# Patient Record
Sex: FEMALE | Race: ASIAN | Employment: FULL TIME | URBAN - NONMETROPOLITAN AREA
[De-identification: names, ages, dates, MRNs, and addresses within clinical notes are randomized per-mention and may not be internally consistent; named-entity substitution may affect disease eponyms.]

---

## 2017-01-31 ENCOUNTER — TRANSFERRED RECORDS (OUTPATIENT)
Dept: HEALTH INFORMATION MANAGEMENT | Facility: HOSPITAL | Age: 52
End: 2017-01-31

## 2017-01-31 ENCOUNTER — RESULTS ONLY (OUTPATIENT)
Dept: LAB | Age: 52
End: 2017-01-31

## 2017-01-31 ENCOUNTER — APPOINTMENT (OUTPATIENT)
Dept: LAB | Facility: HOSPITAL | Age: 52
End: 2017-01-31
Attending: SURGERY
Payer: COMMERCIAL

## 2017-01-31 LAB — COLONOSCOPY: NORMAL

## 2017-01-31 PROCEDURE — 88305 TISSUE EXAM BY PATHOLOGIST: CPT | Mod: TC | Performed by: SURGERY

## 2017-02-02 LAB — COPATH REPORT: NORMAL

## 2017-03-14 ENCOUNTER — TRANSFERRED RECORDS (OUTPATIENT)
Dept: HEALTH INFORMATION MANAGEMENT | Facility: HOSPITAL | Age: 52
End: 2017-03-14

## 2017-03-14 LAB
ALT SERPL-CCNC: 38 U/L (ref 19–47)
AST SERPL-CCNC: 60 U/L (ref 9–34)
CHOLEST SERPL-MCNC: 192 MG/DL (ref 0–200)
CREAT SERPL-MCNC: 0.6 MG/DL (ref 0.7–1.4)
GLUCOSE SERPL-MCNC: 103 MG/DL (ref 64–112)
HDLC SERPL-MCNC: 54 MG/DL (ref 35–80)
LDLC SERPL CALC-MCNC: 68 MG/DL (ref 0–130)
POTASSIUM SERPL-SCNC: 4 MEQ/L (ref 3.5–5.3)
TRIGL SERPL-MCNC: 350 MG/DL (ref 40–197)

## 2017-03-16 DIAGNOSIS — R10.32 ABDOMINAL PAIN, LEFT LOWER QUADRANT: Primary | ICD-10-CM

## 2017-03-18 DIAGNOSIS — R10.32 ABDOMINAL PAIN, LEFT LOWER QUADRANT: Primary | ICD-10-CM

## 2017-03-20 DIAGNOSIS — R10.32 ABDOMINAL PAIN, LEFT LOWER QUADRANT: Primary | ICD-10-CM

## 2017-03-22 ENCOUNTER — HOSPITAL ENCOUNTER (OUTPATIENT)
Dept: CT IMAGING | Facility: HOSPITAL | Age: 52
Discharge: HOME | End: 2017-03-22
Attending: NURSE PRACTITIONER | Admitting: NURSE PRACTITIONER
Payer: COMMERCIAL

## 2017-03-22 PROCEDURE — 74177 CT ABD & PELVIS W/CONTRAST: CPT | Mod: TC

## 2017-03-22 RX ORDER — IOPAMIDOL 612 MG/ML
100 INJECTION, SOLUTION INTRAVASCULAR ONCE
Status: COMPLETED | OUTPATIENT
Start: 2017-03-22 | End: 2017-03-22

## 2017-03-22 RX ADMIN — DIATRIZOATE MEGLUMINE AND DIATRIZOATE SODIUM 30 ML: 660; 100 SOLUTION ORAL; RECTAL at 12:56

## 2017-03-22 RX ADMIN — IOPAMIDOL 100 ML: 612 INJECTION, SOLUTION INTRAVASCULAR at 12:56

## 2017-03-29 ENCOUNTER — TRANSFERRED RECORDS (OUTPATIENT)
Dept: HEALTH INFORMATION MANAGEMENT | Facility: HOSPITAL | Age: 52
End: 2017-03-29

## 2017-04-27 ENCOUNTER — OFFICE VISIT (OUTPATIENT)
Dept: OBGYN | Facility: OTHER | Age: 52
End: 2017-04-27
Attending: OBSTETRICS & GYNECOLOGY
Payer: COMMERCIAL

## 2017-04-27 VITALS
DIASTOLIC BLOOD PRESSURE: 74 MMHG | WEIGHT: 140 LBS | HEIGHT: 64 IN | SYSTOLIC BLOOD PRESSURE: 104 MMHG | HEART RATE: 77 BPM | BODY MASS INDEX: 23.9 KG/M2

## 2017-04-27 DIAGNOSIS — R10.2 PELVIC PAIN IN FEMALE: ICD-10-CM

## 2017-04-27 DIAGNOSIS — R10.32 LEFT LOWER QUADRANT PAIN: Primary | ICD-10-CM

## 2017-04-27 DIAGNOSIS — D25.1 INTRAMURAL LEIOMYOMA OF UTERUS: ICD-10-CM

## 2017-04-27 DIAGNOSIS — N83.202 CYST OF LEFT OVARY: ICD-10-CM

## 2017-04-27 LAB
MICRO REPORT STATUS: NORMAL
SPECIMEN SOURCE: NORMAL
WET PREP SPEC: NORMAL

## 2017-04-27 PROCEDURE — 99214 OFFICE O/P EST MOD 30 MIN: CPT

## 2017-04-27 PROCEDURE — 99000 SPECIMEN HANDLING OFFICE-LAB: CPT | Performed by: OBSTETRICS & GYNECOLOGY

## 2017-04-27 PROCEDURE — 99203 OFFICE O/P NEW LOW 30 MIN: CPT | Mod: 57 | Performed by: OBSTETRICS & GYNECOLOGY

## 2017-04-27 PROCEDURE — 87491 CHLMYD TRACH DNA AMP PROBE: CPT | Performed by: OBSTETRICS & GYNECOLOGY

## 2017-04-27 PROCEDURE — 87591 N.GONORRHOEAE DNA AMP PROB: CPT | Performed by: OBSTETRICS & GYNECOLOGY

## 2017-04-27 PROCEDURE — 87210 SMEAR WET MOUNT SALINE/INK: CPT | Performed by: OBSTETRICS & GYNECOLOGY

## 2017-04-27 NOTE — PROGRESS NOTES
"CC:  Consult from Jessie Peters NP for Pelvic pain  HPI:  Katherine Royal is a 51 year old female is a   P1(vag).    Menses are monthly and very light. days, lasting 1 days.  + vasomotor sx.  She describes pelvic pain for  7  Years and dyspareunia since marriage 1 year ago.  Intermittent 1-2 weeks monthly.    Location: left side  Aggravating factors Intercource, activity  Alleviating factors: Fluids/juice  Radiation no  Prior treatment/tests yes  CT:  + renal calculi.  Ureters nml.  Small uterine fibroid 1cm.  Small ovarian cyst left 2cm.        Patients records are available and reviewed at today's visit.    Past GYN history:  No STD history       Last PAP smear:  Normal 12/16 per pt.      No past medical history on file.  NIDDM, Htn  No past surgical history on file.  Appy    No family history on file.    Allergies: Review of patient's allergies indicates no known allergies.    Current Outpatient Prescriptions   Medication Sig Dispense Refill     blood glucose (ACCU-CHEK FASTCLIX) lancing device        blood glucose monitoring (NO BRAND SPECIFIED) test strip        aspirin EC 81 MG EC tablet Take 81 mg by mouth daily        atorvastatin (LIPITOR) 10 MG tablet Take 10 mg by mouth daily        hydrochlorothiazide 12.5 MG TABS tablet        losartan (COZAAR) 50 MG tablet Take 50 mg by mouth       metFORMIN (GLUCOPHAGE) 500 MG tablet Take 1 tablet (500 mg) by mouth 2 times daily (with meals) 180 tablet 1     cloNIDine (CATAPRES) 0.1 MG tablet Take 1 tablet (0.1 mg) by mouth At Bedtime For elevated blood pressure 60 tablet 0         ROS:  C: NEGATIVE for fever, chills, change in weight  GI: NEGATIVE  heartburn, or change in bowel habits.  + colonoscopy 2016 nml. + nausea with pain.  : NEGATIVE for frequency, dysuria, hematuria, vaginal discharge, or irregular bleeding  P: NEGATIVE for changes in mood or affect    EXAM:  Blood pressure 104/74, pulse 77, height 5' 4\" (1.626 m), weight 140 lb (63.5 kg).   BMI= Body mass " index is 24.03 kg/(m^2).  General - pleasant female in no acute distress.   Neurological -  mental status normal.  Alert and oriented.  Abdomen - soft, nontender, nondistended, no hepatosplenomegaly.  Pelvic - EG: normal adult female, BUS: within normal limits, Vagina: without lesions or  discharge, Cervix: no lesions or CMT, Uterus: firm, normal size, contour, and nontender. Adnexae: Left adnexal slightly enlarged and TTP  Anus without lesions  Rectovaginal - deferred.  Ext:  No edema  Neurological -  mental status normal.  Alert and oriented.  Pap smear done:  No  Cervical cultures:Yes      ASSESSMENT/PLAN:  Chronic pelvic pain/dyspareunia.  Discussed possible GYN/GI or  etiologies. Small ovarian cyst/fibroid likely incidental in nature.   As her pain is in left adnexa and she is still premenopausal  I think it would be reasonable to proceed with diagnostic laparoscopy, indicated procedures with possible LSO for further evaluation and treatment.  R/o endometriosis/adhesions etc..  Pt desires to proceed.  Scheduled 5/10.  Reviewed goals, risks, alternatives for planned procedure.  Including risk of bleeding, infection, damage to nerves, blood vessels, bowel and bladder. Discussed recovery period and expected discomfort.. All questions were answered. Pt to schedule Preop.   Preoperative instructions discussed.  NPO after midnight.          Misael Vance MD

## 2017-04-27 NOTE — NURSING NOTE
"Chief Complaint   Patient presents with     Consult     [facility] pain       Initial /74  Pulse 77  Ht 5' 4\" (1.626 m)  Wt 140 lb (63.5 kg)  BMI 24.03 kg/m2 Estimated body mass index is 24.03 kg/(m^2) as calculated from the following:    Height as of this encounter: 5' 4\" (1.626 m).    Weight as of this encounter: 140 lb (63.5 kg).  Medication Reconciliation: santo Chung      "

## 2017-04-27 NOTE — MR AVS SNAPSHOT
"              After Visit Summary   2017    Katherine Royal    MRN: [id number]           Patient Information     Date Of Birth          1965        Visit Information        Provider Department      2017 11:00 AM Misael Vance MD [facility]        Today's Diagnoses     Left lower quadrant pain    -  1    Pelvic pain in female        Intramural leiomyoma of uterus        Cyst of left ovary          Care Instructions    NPO after midnight.        Follow-ups after your visit        Who to contact     If you have questions or need follow up information about today's clinic visit or your schedule please contact [facility] directly at [phone].  Normal or non-critical lab and imaging results will be communicated to you by RewardSnaphart, letter or phone within 4 business days after the clinic has received the results. If you do not hear from us within 7 days, please contact the clinic through RewardSnaphart or phone. If you have a critical or abnormal lab result, we will notify you by phone as soon as possible.  Submit refill requests through LaunchKey or call your pharmacy and they will forward the refill request to us. Please allow 3 business days for your refill to be completed.          Additional Information About Your Visit        MyChart Information     LaunchKey lets you send messages to your doctor, view your test results, renew your prescriptions, schedule appointments and more. To sign up, go to [email or web address] . Click on \"Log in\" on the left side of the screen, which will take you to the Welcome page. Then click on \"Sign up Now\" on the right side of the page.     You will be asked to enter the access code listed below, as well as some personal information. Please follow the directions to create your username and password.     Your access code is: [email or web address]  Expires: 2017 12:23 PM     Your access code will  in 90 days. If you need help or a new code, please call " "your [place] clinic or [phone].        Care EveryWhere ID     This is your Care EveryWhere ID. This could be used by other organizations to access your [place] medical records  [id number]        Your Vitals Were     Pulse Height BMI (Body Mass Index)             77 5' 4\" (1.626 m) 24.03 kg/m2          Blood Pressure from Last 3 Encounters:   04/27/17 104/74   12/05/16 110/70    Weight from Last 3 Encounters:   04/27/17 140 lb (63.5 kg)   12/05/16 138 lb (62.6 kg)              We Performed the Following     CHLAMYDIA TRACHOMATIS PCR     NEISSERIA GONORRHOEA PCR     Wet prep        Primary Care Provider Office Phone # Fax #    Jessie Peters [phone]       [place] [id number] [address] [address]        Thank you!     Thank you for choosing [facility]  for your care. Our goal is always to provide you with excellent care. Hearing back from our patients is one way we can continue to improve our services. Please take a few minutes to complete the written survey that you may receive in the mail after your visit with us. Thank you!             Your Updated Medication List - Protect others around you: Learn how to safely use, store and throw away your medicines at www.disposemymeds.org.          This list is accurate as of: 4/27/17 12:23 PM.  Always use your most recent med list.                   Brand Name Dispense Instructions for use    aspirin EC 81 MG EC tablet      Take 81 mg by mouth daily       atorvastatin 10 MG tablet    LIPITOR     Take 10 mg by mouth daily       blood glucose lancing device          blood glucose monitoring test strip    no brand specified         cloNIDine 0.1 MG tablet    CATAPRES    60 tablet    Take 1 tablet (0.1 mg) by mouth At Bedtime For elevated blood pressure       hydrochlorothiazide 12.5 MG Tabs tablet          losartan 50 MG tablet    COZAAR     Take 50 mg by mouth       metFORMIN 500 MG tablet    GLUCOPHAGE "    180 tablet    Take 1 tablet (500 mg) by mouth 2 times daily (with meals)

## 2017-04-30 LAB
C TRACH DNA SPEC QL NAA+PROBE: NORMAL
N GONORRHOEA DNA SPEC QL NAA+PROBE: NORMAL
SPECIMEN SOURCE: NORMAL
SPECIMEN SOURCE: NORMAL

## 2017-05-03 DIAGNOSIS — N94.10 DYSPAREUNIA, FEMALE: ICD-10-CM

## 2017-05-03 DIAGNOSIS — N83.209 OVARIAN CYST: ICD-10-CM

## 2017-05-03 DIAGNOSIS — R10.2 PELVIC PAIN IN FEMALE: Primary | ICD-10-CM

## 2017-05-04 ENCOUNTER — TRANSFERRED RECORDS (OUTPATIENT)
Dept: HEALTH INFORMATION MANAGEMENT | Facility: HOSPITAL | Age: 52
End: 2017-05-04

## 2017-05-10 ENCOUNTER — ANESTHESIA (OUTPATIENT)
Dept: SURGERY | Facility: HOSPITAL | Age: 52
End: 2017-05-10
Payer: COMMERCIAL

## 2017-05-10 ENCOUNTER — HOSPITAL ENCOUNTER (OUTPATIENT)
Facility: HOSPITAL | Age: 52
Discharge: HOME | End: 2017-05-10
Attending: OBSTETRICS & GYNECOLOGY | Admitting: OBSTETRICS & GYNECOLOGY
Payer: COMMERCIAL

## 2017-05-10 ENCOUNTER — SURGERY (OUTPATIENT)
Age: 52
End: 2017-05-10

## 2017-05-10 ENCOUNTER — ANESTHESIA EVENT (OUTPATIENT)
Dept: SURGERY | Facility: HOSPITAL | Age: 52
End: 2017-05-10
Payer: COMMERCIAL

## 2017-05-10 VITALS
TEMPERATURE: 97.1 F | HEIGHT: 64 IN | WEIGHT: 140.4 LBS | RESPIRATION RATE: 16 BRPM | SYSTOLIC BLOOD PRESSURE: 100 MMHG | DIASTOLIC BLOOD PRESSURE: 74 MMHG | BODY MASS INDEX: 23.97 KG/M2 | OXYGEN SATURATION: 97 %

## 2017-05-10 DIAGNOSIS — Z98.890 POST-OPERATIVE STATE: Primary | ICD-10-CM

## 2017-05-10 LAB
ABO + RH BLD: NORMAL
ABO + RH BLD: NORMAL
BLD GP AB SCN SERPL QL: NORMAL
BLOOD BANK CMNT PATIENT-IMP: NORMAL
EST. AVERAGE GLUCOSE BLD GHB EST-MCNC: 146 MG/DL
GLUCOSE BLDC GLUCOMTR-MCNC: 138 MG/DL (ref 70–99)
HBA1C MFR BLD: 6.7 % (ref 4.3–6)
HCG UR QL: NEGATIVE
SPECIMEN EXP DATE BLD: NORMAL

## 2017-05-10 PROCEDURE — 25000132 ZZH RX MED GY IP 250 OP 250 PS 637

## 2017-05-10 PROCEDURE — 71000015 ZZH RECOVERY PHASE 1 LEVEL 2 EA ADDTL HR: Performed by: OBSTETRICS & GYNECOLOGY

## 2017-05-10 PROCEDURE — 37000009 ZZH ANESTHESIA TECHNICAL FEE, EACH ADDTL 15 MIN: Performed by: OBSTETRICS & GYNECOLOGY

## 2017-05-10 PROCEDURE — 36000056 ZZH SURGERY LEVEL 3 1ST 30 MIN: Performed by: OBSTETRICS & GYNECOLOGY

## 2017-05-10 PROCEDURE — 40000305 ZZH STATISTIC PRE PROC ASSESS I: Performed by: OBSTETRICS & GYNECOLOGY

## 2017-05-10 PROCEDURE — 86850 RBC ANTIBODY SCREEN: CPT | Performed by: OBSTETRICS & GYNECOLOGY

## 2017-05-10 PROCEDURE — 25800025 ZZH RX 258: Performed by: ANESTHESIOLOGY

## 2017-05-10 PROCEDURE — 88305 TISSUE EXAM BY PATHOLOGIST: CPT | Mod: TC | Performed by: OBSTETRICS & GYNECOLOGY

## 2017-05-10 PROCEDURE — 82962 GLUCOSE BLOOD TEST: CPT

## 2017-05-10 PROCEDURE — 36000058 ZZH SURGERY LEVEL 3 EA 15 ADDTL MIN: Performed by: OBSTETRICS & GYNECOLOGY

## 2017-05-10 PROCEDURE — 36415 COLL VENOUS BLD VENIPUNCTURE: CPT | Performed by: OBSTETRICS & GYNECOLOGY

## 2017-05-10 PROCEDURE — 40000788 ZZHCL STATISTIC ESTIMATED AVERAGE GLUCOSE: Performed by: ANESTHESIOLOGY

## 2017-05-10 PROCEDURE — 25000125 ZZHC RX 250: Performed by: ANESTHESIOLOGY

## 2017-05-10 PROCEDURE — 25000566 ZZH SEVOFLURANE, EA 15 MIN: Performed by: ANESTHESIOLOGY

## 2017-05-10 PROCEDURE — 86901 BLOOD TYPING SEROLOGIC RH(D): CPT | Performed by: OBSTETRICS & GYNECOLOGY

## 2017-05-10 PROCEDURE — 86900 BLOOD TYPING SEROLOGIC ABO: CPT | Performed by: OBSTETRICS & GYNECOLOGY

## 2017-05-10 PROCEDURE — 25000125 ZZHC RX 250: Performed by: NURSE ANESTHETIST, CERTIFIED REGISTERED

## 2017-05-10 PROCEDURE — 27210794 ZZH OR GENERAL SUPPLY STERILE: Performed by: OBSTETRICS & GYNECOLOGY

## 2017-05-10 PROCEDURE — 71000027 ZZH RECOVERY PHASE 2 EACH 15 MINS: Performed by: OBSTETRICS & GYNECOLOGY

## 2017-05-10 PROCEDURE — 58662 LAPAROSCOPY EXCISE LESIONS: CPT | Performed by: OBSTETRICS & GYNECOLOGY

## 2017-05-10 PROCEDURE — 27110028 ZZH OR GENERAL SUPPLY NON-STERILE: Performed by: OBSTETRICS & GYNECOLOGY

## 2017-05-10 PROCEDURE — 25000128 H RX IP 250 OP 636: Performed by: OBSTETRICS & GYNECOLOGY

## 2017-05-10 PROCEDURE — 71000014 ZZH RECOVERY PHASE 1 LEVEL 2 FIRST HR: Performed by: OBSTETRICS & GYNECOLOGY

## 2017-05-10 PROCEDURE — 83036 HEMOGLOBIN GLYCOSYLATED A1C: CPT | Performed by: ANESTHESIOLOGY

## 2017-05-10 PROCEDURE — 25000128 H RX IP 250 OP 636: Performed by: NURSE ANESTHETIST, CERTIFIED REGISTERED

## 2017-05-10 PROCEDURE — 37000008 ZZH ANESTHESIA TECHNICAL FEE, 1ST 30 MIN: Performed by: OBSTETRICS & GYNECOLOGY

## 2017-05-10 PROCEDURE — 81025 URINE PREGNANCY TEST: CPT | Performed by: OBSTETRICS & GYNECOLOGY

## 2017-05-10 PROCEDURE — 58661 LAPAROSCOPY REMOVE ADNEXA: CPT | Performed by: ANESTHESIOLOGY

## 2017-05-10 PROCEDURE — 01999 UNLISTED ANES PROCEDURE: CPT | Performed by: NURSE ANESTHETIST, CERTIFIED REGISTERED

## 2017-05-10 PROCEDURE — 58661 LAPAROSCOPY REMOVE ADNEXA: CPT | Performed by: OBSTETRICS & GYNECOLOGY

## 2017-05-10 RX ORDER — MEPERIDINE HYDROCHLORIDE 25 MG/ML
12.5 INJECTION INTRAMUSCULAR; INTRAVENOUS; SUBCUTANEOUS
Status: DISCONTINUED | OUTPATIENT
Start: 2017-05-10 | End: 2017-05-10 | Stop reason: HOSPADM

## 2017-05-10 RX ORDER — ALBUTEROL SULFATE 0.83 MG/ML
2.5 SOLUTION RESPIRATORY (INHALATION) EVERY 4 HOURS PRN
Status: DISCONTINUED | OUTPATIENT
Start: 2017-05-10 | End: 2017-05-10 | Stop reason: HOSPADM

## 2017-05-10 RX ORDER — DEXAMETHASONE SODIUM PHOSPHATE 4 MG/ML
4 INJECTION, SOLUTION INTRA-ARTICULAR; INTRALESIONAL; INTRAMUSCULAR; INTRAVENOUS; SOFT TISSUE EVERY 10 MIN PRN
Status: DISCONTINUED | OUTPATIENT
Start: 2017-05-10 | End: 2017-05-10 | Stop reason: HOSPADM

## 2017-05-10 RX ORDER — SODIUM CHLORIDE, SODIUM LACTATE, POTASSIUM CHLORIDE, CALCIUM CHLORIDE 600; 310; 30; 20 MG/100ML; MG/100ML; MG/100ML; MG/100ML
INJECTION, SOLUTION INTRAVENOUS CONTINUOUS
Status: DISCONTINUED | OUTPATIENT
Start: 2017-05-10 | End: 2017-05-10 | Stop reason: HOSPADM

## 2017-05-10 RX ORDER — FENTANYL CITRATE 50 UG/ML
25-50 INJECTION, SOLUTION INTRAMUSCULAR; INTRAVENOUS
Status: DISCONTINUED | OUTPATIENT
Start: 2017-05-10 | End: 2017-05-10 | Stop reason: HOSPADM

## 2017-05-10 RX ORDER — LABETALOL HYDROCHLORIDE 5 MG/ML
10 INJECTION, SOLUTION INTRAVENOUS
Status: DISCONTINUED | OUTPATIENT
Start: 2017-05-10 | End: 2017-05-10 | Stop reason: HOSPADM

## 2017-05-10 RX ORDER — PROMETHAZINE HYDROCHLORIDE 25 MG/ML
12.5 INJECTION, SOLUTION INTRAMUSCULAR; INTRAVENOUS
Status: DISCONTINUED | OUTPATIENT
Start: 2017-05-10 | End: 2017-05-10 | Stop reason: HOSPADM

## 2017-05-10 RX ORDER — DEXAMETHASONE SODIUM PHOSPHATE 10 MG/ML
INJECTION, SOLUTION INTRAMUSCULAR; INTRAVENOUS PRN
Status: DISCONTINUED | OUTPATIENT
Start: 2017-05-10 | End: 2017-05-10

## 2017-05-10 RX ORDER — HYDRALAZINE HYDROCHLORIDE 20 MG/ML
2.5-5 INJECTION INTRAMUSCULAR; INTRAVENOUS EVERY 10 MIN PRN
Status: DISCONTINUED | OUTPATIENT
Start: 2017-05-10 | End: 2017-05-10 | Stop reason: HOSPADM

## 2017-05-10 RX ORDER — HYDROCODONE BITARTRATE AND ACETAMINOPHEN 5; 325 MG/1; MG/1
TABLET ORAL
Status: COMPLETED
Start: 2017-05-10 | End: 2017-05-10

## 2017-05-10 RX ORDER — IBUPROFEN 600 MG/1
600 TABLET, FILM COATED ORAL EVERY 6 HOURS PRN
Qty: 30 TABLET | Refills: 1 | Status: SHIPPED | OUTPATIENT
Start: 2017-05-10 | End: 2017-08-29

## 2017-05-10 RX ORDER — SCOLOPAMINE TRANSDERMAL SYSTEM 1 MG/1
1 PATCH, EXTENDED RELEASE TRANSDERMAL ONCE
Status: COMPLETED | OUTPATIENT
Start: 2017-05-10 | End: 2017-05-10

## 2017-05-10 RX ORDER — ONDANSETRON 2 MG/ML
INJECTION INTRAMUSCULAR; INTRAVENOUS PRN
Status: DISCONTINUED | OUTPATIENT
Start: 2017-05-10 | End: 2017-05-10

## 2017-05-10 RX ORDER — HYDROCODONE BITARTRATE AND ACETAMINOPHEN 5; 325 MG/1; MG/1
2 TABLET ORAL EVERY 6 HOURS PRN
Qty: 40 TABLET | Refills: 0 | Status: SHIPPED | OUTPATIENT
Start: 2017-05-10 | End: 2017-08-29

## 2017-05-10 RX ORDER — CEFAZOLIN SODIUM 2 G/100ML
2 INJECTION, SOLUTION INTRAVENOUS
Status: COMPLETED | OUTPATIENT
Start: 2017-05-10 | End: 2017-05-10

## 2017-05-10 RX ORDER — KETOROLAC TROMETHAMINE 30 MG/ML
30 INJECTION, SOLUTION INTRAMUSCULAR; INTRAVENOUS ONCE
Status: COMPLETED | OUTPATIENT
Start: 2017-05-10 | End: 2017-05-10

## 2017-05-10 RX ORDER — KETOROLAC TROMETHAMINE 30 MG/ML
30 INJECTION, SOLUTION INTRAMUSCULAR; INTRAVENOUS EVERY 6 HOURS PRN
Status: DISCONTINUED | OUTPATIENT
Start: 2017-05-10 | End: 2017-05-10 | Stop reason: HOSPADM

## 2017-05-10 RX ORDER — FENTANYL CITRATE 50 UG/ML
INJECTION, SOLUTION INTRAMUSCULAR; INTRAVENOUS PRN
Status: DISCONTINUED | OUTPATIENT
Start: 2017-05-10 | End: 2017-05-10

## 2017-05-10 RX ORDER — LIDOCAINE HYDROCHLORIDE 20 MG/ML
INJECTION, SOLUTION INFILTRATION; PERINEURAL PRN
Status: DISCONTINUED | OUTPATIENT
Start: 2017-05-10 | End: 2017-05-10

## 2017-05-10 RX ORDER — PROPOFOL 10 MG/ML
INJECTION, EMULSION INTRAVENOUS PRN
Status: DISCONTINUED | OUTPATIENT
Start: 2017-05-10 | End: 2017-05-10

## 2017-05-10 RX ORDER — ONDANSETRON 2 MG/ML
4 INJECTION INTRAMUSCULAR; INTRAVENOUS EVERY 30 MIN PRN
Status: DISCONTINUED | OUTPATIENT
Start: 2017-05-10 | End: 2017-05-10 | Stop reason: HOSPADM

## 2017-05-10 RX ORDER — HYDROMORPHONE HYDROCHLORIDE 1 MG/ML
.3-.5 INJECTION, SOLUTION INTRAMUSCULAR; INTRAVENOUS; SUBCUTANEOUS EVERY 10 MIN PRN
Status: DISCONTINUED | OUTPATIENT
Start: 2017-05-10 | End: 2017-05-10 | Stop reason: HOSPADM

## 2017-05-10 RX ORDER — NALOXONE HYDROCHLORIDE 0.4 MG/ML
.1-.4 INJECTION, SOLUTION INTRAMUSCULAR; INTRAVENOUS; SUBCUTANEOUS
Status: DISCONTINUED | OUTPATIENT
Start: 2017-05-10 | End: 2017-05-10 | Stop reason: HOSPADM

## 2017-05-10 RX ORDER — ONDANSETRON 4 MG/1
4 TABLET, ORALLY DISINTEGRATING ORAL EVERY 30 MIN PRN
Status: DISCONTINUED | OUTPATIENT
Start: 2017-05-10 | End: 2017-05-10 | Stop reason: HOSPADM

## 2017-05-10 RX ADMIN — SCOPALAMINE 1 PATCH: 1 PATCH, EXTENDED RELEASE TRANSDERMAL at 08:12

## 2017-05-10 RX ADMIN — PROPOFOL 150 MG: 10 INJECTION, EMULSION INTRAVENOUS at 09:09

## 2017-05-10 RX ADMIN — MIDAZOLAM HYDROCHLORIDE 1 MG: 1 INJECTION, SOLUTION INTRAMUSCULAR; INTRAVENOUS at 09:09

## 2017-05-10 RX ADMIN — FENTANYL CITRATE 50 MCG: 50 INJECTION, SOLUTION INTRAMUSCULAR; INTRAVENOUS at 08:58

## 2017-05-10 RX ADMIN — ONDANSETRON 4 MG: 2 INJECTION INTRAMUSCULAR; INTRAVENOUS at 09:50

## 2017-05-10 RX ADMIN — SODIUM CHLORIDE, POTASSIUM CHLORIDE, SODIUM LACTATE AND CALCIUM CHLORIDE: 600; 310; 30; 20 INJECTION, SOLUTION INTRAVENOUS at 09:25

## 2017-05-10 RX ADMIN — DEXAMETHASONE SODIUM PHOSPHATE 10 MG: 10 INJECTION, SOLUTION INTRAMUSCULAR; INTRAVENOUS at 09:50

## 2017-05-10 RX ADMIN — HYDROCODONE BITARTRATE AND ACETAMINOPHEN 2 TABLET: 5; 325 TABLET ORAL at 11:41

## 2017-05-10 RX ADMIN — FENTANYL CITRATE 50 MCG: 50 INJECTION, SOLUTION INTRAMUSCULAR; INTRAVENOUS at 11:30

## 2017-05-10 RX ADMIN — LIDOCAINE HYDROCHLORIDE 40 MG: 20 INJECTION, SOLUTION INFILTRATION; PERINEURAL at 09:09

## 2017-05-10 RX ADMIN — FENTANYL CITRATE 50 MCG: 50 INJECTION, SOLUTION INTRAMUSCULAR; INTRAVENOUS at 10:50

## 2017-05-10 RX ADMIN — ROCURONIUM BROMIDE 10 MG: 10 INJECTION INTRAVENOUS at 09:09

## 2017-05-10 RX ADMIN — FENTANYL CITRATE 50 MCG: 50 INJECTION, SOLUTION INTRAMUSCULAR; INTRAVENOUS at 09:09

## 2017-05-10 RX ADMIN — MIDAZOLAM HYDROCHLORIDE 1 MG: 1 INJECTION, SOLUTION INTRAMUSCULAR; INTRAVENOUS at 08:58

## 2017-05-10 RX ADMIN — KETOROLAC TROMETHAMINE 30 MG: 30 INJECTION, SOLUTION INTRAMUSCULAR; INTRAVENOUS at 08:12

## 2017-05-10 RX ADMIN — SODIUM CHLORIDE, POTASSIUM CHLORIDE, SODIUM LACTATE AND CALCIUM CHLORIDE: 600; 310; 30; 20 INJECTION, SOLUTION INTRAVENOUS at 08:13

## 2017-05-10 RX ADMIN — CEFAZOLIN SODIUM 2 G: 2 INJECTION, SOLUTION INTRAVENOUS at 08:58

## 2017-05-10 RX ADMIN — SUCCINYLCHOLINE CHLORIDE 100 MG: 20 INJECTION, SOLUTION INTRAMUSCULAR; INTRAVENOUS at 09:09

## 2017-05-10 RX ADMIN — FENTANYL CITRATE 50 MCG: 50 INJECTION, SOLUTION INTRAMUSCULAR; INTRAVENOUS at 10:37

## 2017-05-10 NOTE — IP AVS SNAPSHOT
MRN:[id number]                      After Visit Summary   5/10/2017    Katherine Royal    MRN: [id number]           Thank you!     Thank you for choosing [place] [facility] for your care. Our goal is always to provide you with excellent care. Hearing back from our patients is one way we can continue to improve our services. Please take a few minutes to complete the written survey that you may receive in the mail after you visit with us. Thank you!        Patient Information     Date Of Birth          1965        About your hospital stay     You were admitted on:  May 10, 2017 You last received care in the:  [facility] Preop/Phase II    You were discharged on:  May 10, 2017       Who to Call     For medical emergencies, please call 911.  For non-urgent questions about your medical care, please call your primary care provider or clinic, [phone]  For questions related to your surgery, please call your surgery clinic        Attending Provider     Provider Specialty    Misael Vance MD OB/Gyn       Primary Care Provider Office Phone # Fax #    Jessie Peters [phone]       [facility] [address] [facility] [address]  [facility] [address]        Your next 10 appointments already scheduled     May 25, 2017  2:45 PM CDT   (Arrive by 2:30 PM)   Post Op with Misael Vance MD   [facility] ([facility])    [address] [place] [id number] [phone]              Further instructions from your care team       Call MD prior to DC.  No driving today or while on pain meds  Pelvic rest for 2 weeks  Call Dr. Vance [phone] as necessary if problems in interim.  No heavy lifting, vigorous activity, swim, bath, exercise for 2 weeks  Schedule PO check 2 weeks Dr. Vance        Post-Anesthesia Patient Instructions    IMMEDIATELY FOLLOWING SURGERY:  Do not drive or operate machinery for the first twenty four hours after surgery.  Do not make any important decisions for  "twenty four hours after surgery or while taking narcotic pain medications or sedatives.  If you develop intractable nausea and vomiting or a severe headache please notify your doctor immediately.    FOLLOW-UP:  Please make an appointment with your surgeon as instructed. You do not need to follow up with anesthesia unless specifically instructed to do so.    WOUND CARE INSTRUCTIONS (if applicable):  Keep a dry clean dressing on the anesthesia/puncture wound site if there is drainage.  Once the wound has quit draining you may leave it open to air.  Generally you should leave the bandage intact for twenty four hours unless there is drainage.  If the epidural site drains for more than 36-48 hours please call the anesthesia department.    QUESTIONS?:  Please feel free to call your physician or the hospital  if you have any questions, and they will be happy to assist you.       Remove the scopolamine patch behind your left ear after 24 hours after application.   After removing the patch, wash your hands and the area behind your ear thoroughly with soap and water.   The patch will still contain some medicine after use.   To avoid accidental contact or ingestion by children or pets, fold the used patch in half with the sticky side together and throw away in the trash out of the reach of children and pets.       Pending Results     No orders found from 5/8/2017 to 5/11/2017.            Admission Information     Date & Time Provider Department Dept. Phone    5/10/2017 Misael Vance MD [facility] Preop/Phase [phone]      Your Vitals Were     Blood Pressure Temperature Respirations Height Weight Pulse Oximetry    106/76 97.1  F (36.2  C) (Oral) 14 1.626 m (5' 4\") 63.7 kg (140 lb 6.4 oz) 97%    BMI (Body Mass Index)                   24.1 kg/m2           MyChart Information     Triptrotting lets you send messages to your doctor, view your test results, renew your prescriptions, schedule appointments and more. To sign up, go " "to [email or web address] . Click on \"Log in\" on the left side of the screen, which will take you to the Welcome page. Then click on \"Sign up Now\" on the right side of the page.     You will be asked to enter the access code listed below, as well as some personal information. Please follow the directions to create your username and password.     Your access code is: [email or web address]  Expires: 2017 12:23 PM     Your access code will  in 90 days. If you need help or a new code, please call your [place] clinic or [phone].        Care EveryWhere ID     This is your Care EveryWhere ID. This could be used by other organizations to access your [place] medical records  [id number]           Review of your medicines      START taking        Dose / Directions    HYDROcodone-acetaminophen 5-325 MG per tablet   Commonly known as:  NORCO   Used for:  Post-operative state        Dose:  2 tablet   Take 2 tablets by mouth every 6 hours as needed for moderate to severe pain   Quantity:  40 tablet   Refills:  0       ibuprofen 600 MG tablet   Commonly known as:  ADVIL/MOTRIN   Used for:  Post-operative state        Dose:  600 mg   Take 1 tablet (600 mg) by mouth every 6 hours as needed for moderate pain   Quantity:  30 tablet   Refills:  1         CONTINUE these medicines which have NOT CHANGED        Dose / Directions    aspirin EC 81 MG EC tablet        Dose:  81 mg   Take 81 mg by mouth daily   Refills:  0       atorvastatin 10 MG tablet   Commonly known as:  LIPITOR        Dose:  10 mg   Take 10 mg by mouth daily   Refills:  0       blood glucose lancing device        Refills:  0       blood glucose monitoring test strip   Commonly known as:  no brand specified        Refills:  0       cloNIDine 0.1 MG tablet   Commonly known as:  CATAPRES   Used for:  Benign essential hypertension        Dose:  0.1 mg   Take 1 tablet (0.1 mg) by mouth At Bedtime For elevated blood pressure   Quantity:  60 tablet   Refills:  0    "    GEMFIBROZIL PO        Dose:  600 mg   Take 600 mg by mouth 2 times daily (before meals)   Refills:  0       hydrochlorothiazide 12.5 MG Tabs tablet        Refills:  0       losartan 50 MG tablet   Commonly known as:  COZAAR        Dose:  50 mg   Take 50 mg by mouth   Refills:  0       metFORMIN 500 MG tablet   Commonly known as:  GLUCOPHAGE   Used for:  Type 2 diabetes mellitus without complication, without long-term current use of insulin (H)        Dose:  500 mg   Take 1 tablet (500 mg) by mouth 2 times daily (with meals)   Quantity:  180 tablet   Refills:  1            Where to get your medicines      These medications were sent to [facility] [address] [place] [address]     Phone:  [phone]     ibuprofen 600 MG tablet         Some of these will need a paper prescription and others can be bought over the counter. Ask your nurse if you have questions.     Bring a paper prescription for each of these medications     HYDROcodone-acetaminophen 5-325 MG per tablet                Protect others around you: Learn how to safely use, store and throw away your medicines at www.disposemymeds.org.             Medication List: This is a list of all your medications and when to take them. Check marks below indicate your daily home schedule. Keep this list as a reference.      Medications           Morning Afternoon Evening Bedtime As Needed    aspirin EC 81 MG EC tablet   Take 81 mg by mouth daily                                atorvastatin 10 MG tablet   Commonly known as:  LIPITOR   Take 10 mg by mouth daily                                blood glucose lancing device                                blood glucose monitoring test strip   Commonly known as:  no brand specified                                cloNIDine 0.1 MG tablet   Commonly known as:  CATAPRES   Take 1 tablet (0.1 mg) by mouth At Bedtime For elevated blood pressure                                 GEMFIBROZIL PO   Take 600 mg by mouth 2 times daily (before meals)                                hydrochlorothiazide 12.5 MG Tabs tablet                                HYDROcodone-acetaminophen 5-325 MG per tablet   Commonly known as:  NORCO   Take 2 tablets by mouth every 6 hours as needed for moderate to severe pain                                ibuprofen 600 MG tablet   Commonly known as:  ADVIL/MOTRIN   Take 1 tablet (600 mg) by mouth every 6 hours as needed for moderate pain                                losartan 50 MG tablet   Commonly known as:  COZAAR   Take 50 mg by mouth                                metFORMIN 500 MG tablet   Commonly known as:  GLUCOPHAGE   Take 1 tablet (500 mg) by mouth 2 times daily (with meals)

## 2017-05-10 NOTE — ANESTHESIA PREPROCEDURE EVALUATION
Anesthesia Evaluation     . Pt has had prior anesthetic.     No history of anesthetic complications          ROS/MED HX    ENT/Pulmonary:     (+), . Other pulmonary disease Pulmonary Nodule.    Neurologic:  - neg neurologic ROS     Cardiovascular:     (+) Dyslipidemia, hypertension-range: not on beta blocker, ---. : . . . :. valvular problems/murmurs Remote h/o Rheumatic Heart Disease as a Child:. Previous cardiac testing date:results:date: results:ECG reviewed date:5/4/2017 results:NSR@84, OWN date: results:          METS/Exercise Tolerance:     Hematologic:  - neg hematologic  ROS       Musculoskeletal:   (+) arthritis, , , other musculoskeletal- Chronic LBP w/ Radicular Symptoms      GI/Hepatic:  - neg GI/hepatic ROS       Renal/Genitourinary:     (+) Nephrolithiasis , Other Renal/ Genitourinary, PELVIC PAIN IN FEMALE, DYSPAREUNIA, OVARIAN CYST      Endo:     (+) type II DM Last HgA1c: 6.7 date: 5/10/2017 Not using insulin Normal glucose range: 146 .      Psychiatric:  - neg psychiatric ROS       Infectious Disease:  - neg infectious disease ROS       Malignancy:      - no malignancy   Other:    - neg other ROS                 Physical Exam  Normal systems: cardiovascular and pulmonary    Airway   Mallampati: III  TM distance: >3 FB  Neck ROM: full    Dental   (+) chipped, upper dentures, partials and missing    Cardiovascular   Rhythm and rate: regular and normal      Pulmonary    breath sounds clear to auscultation                    Anesthesia Plan      History & Physical Review  History and physical reviewed and following examination; no interval change.    ASA Status:  3 .    NPO Status:  > 8 hours    Plan for General and ETT with Intravenous and Propofol induction. Maintenance will be Balanced.    PONV prophylaxis:  Ondansetron (or other 5HT-3), Scopolamine patch and Dexamethasone or Solumedrol       Postoperative Care  Postoperative pain management:  IV analgesics and Oral pain medications.       Consents  Anesthetic plan, risks, benefits and alternatives discussed with:  Patient..                          .

## 2017-05-10 NOTE — OP NOTE
[facility]  Operative Note    Pre-operative diagnosis: PELVIC PAIN IN FEMALE, DYSPAREUNIA, FEMALE, OVARIAN CYST   Post-operative diagnosis Endometriosis, Pelvic adhesions, Left Paratubal Cyst   Procedure: Diagnostic Laparoscopy, Fulguration of Endometriosis, Lysis of adhesions, Left Salpingo-oopherectomy,    Surgeon:  Assistant: MD Rosemary Hunt NP   Anesthesia: General    Estimated blood loss: Minimal   Blood transfusion: No transfusion was given during surgery   Drains: None   Specimens: Left tube/ovary   Findings: RLQ and left adnexal adhesions with the left ovary being adhered to left pelvic sidewall.  Small left paratubal cyst.  Endometriotic implants involving left uterosacral ligament, and left pelvic sidewall.  Otherwise nml pelvic and abdominal anatomy.     Complications: None   Condition: Stable       OPERATIVE NARRATION: The patient was brought to the Operating Room and uneventfully placed under general anesthesia. She was prepped and draped in the dorsal lithotomy position and her bladder drained. The cervix was visualized with a speculum and grasped anteriorly with a fine tooth tenaculum and a uterine manipulating device placed. We then changed gloves and proceeded to the abdominal portion of the case. A 5 mm infraumbilical skin incision was performed with a scalpel. A Veress needle was introduced without difficulty and a water drop test performed. The abdomen was insufflated with several liters of carbon dioxide. A 5 mm trocar and a laparoscope were introduced. Visualization was excellent. Next, an 11 mm suprapubic and 5mm LLQ ports were placed under direct visualization.  The uterus was elevated and abdominal and pelvic exploration was performed with findings as described above. The left ovary adhesions were bluntly lysed and the adnexa mobilized.  The LigaSure bipolar cutting cautery device was used to transect the left  IP ligament away from pelvic sidewall structures.   The left adnexa was transected and placed into an endobag and removed intact through suprapubic port.   The RLQ adhesions were lysed with the Ligasure.  The endometriotic implants were treated with monopolar cautery.  The pelvis was irrigated and checked for hemostasis.  The   At this point there was excellent hemostasis and we proceeded to closure. The suprapubic trocar was removed and the fascia closed with a Kermit-Tovar fascial closure device with 0 Vicryl.  The remaining trocars were removed and excess carbon dioxide expressed from the abdomen. The subcutaneous spaces were irrigated and checked for hemostasis. The skin incisions were closed with surgical glue. There were no complications. The uterine manipulating devise was removed.  The patient was transferred to the Recovery Room in excellent and stable condition.     CHANDANA THOMPSON MD

## 2017-05-10 NOTE — IP AVS SNAPSHOT
[facility] Preop/Phase II    [address] [address]    Phone:  [phone]                                       After Visit Summary   5/10/2017    Katherine Royal    MRN: [id number]           After Visit Summary Signature Page     I have received my discharge instructions, and my questions have been answered. I have discussed any challenges I see with this plan with the nurse or doctor.    ..........................................................................................................................................  Patient/Patient Representative Signature      ..........................................................................................................................................  Patient Representative Print Name and Relationship to Patient    ..................................................               ................................................  Date                                            Time    ..........................................................................................................................................  Reviewed by Signature/Title    ...................................................              ..............................................  Date                                                            Time

## 2017-05-10 NOTE — ANESTHESIA CARE TRANSFER NOTE
Patient: Katherine Royal    Procedure(s):  DIAGNOSTIC LAPAROSCOPY, WITH LEFT SALPINGO-OOPHORECTOMY,FULGERATION OF ENDOMETRIOSIS, LYSIS OF ADHESIONS - Wound Class: II-Clean Contaminated    Diagnosis: PELVIC PAIN IN FEMALE, DYSPAREUNIA, FEMALE, OVARIAN CYST  Diagnosis Additional Information: No value filed.    Anesthesia Type:   General, ETT     Note:  Airway :Nasal Cannula  Patient transferred to:PACU        Vitals: (Last set prior to Anesthesia Care Transfer)    CRNA VITALS  5/10/2017 0938 - 5/10/2017 1014      5/10/2017             Pulse: 73    Ht Rate: (!)  0    SpO2: 100 %    Resp Rate (observed): (!)  1    Resp Rate (set): 10                Electronically Signed By: SARTHAK Gomes CRNA  May 10, 2017  10:14 AM

## 2017-05-10 NOTE — ANESTHESIA POSTPROCEDURE EVALUATION
Patient: Katherine Royal    Procedure(s):  DIAGNOSTIC LAPAROSCOPY, WITH LEFT SALPINGO-OOPHORECTOMY,FULGERATION OF ENDOMETRIOSIS, LYSIS OF ADHESIONS - Wound Class: II-Clean Contaminated    Diagnosis:PELVIC PAIN IN FEMALE, DYSPAREUNIA, FEMALE, OVARIAN CYST  Diagnosis Additional Information: No value filed.    Anesthesia Type:  General, ETT    Note:  Anesthesia Post Evaluation    Patient location during evaluation: Phase 2, PACU and Bedside  Patient participation: Able to fully participate in evaluation  Level of consciousness: awake and alert  Pain management: adequate  Airway patency: patent  Cardiovascular status: acceptable  Respiratory status: acceptable  Hydration status: stable  PONV: none     Anesthetic complications: None          Last vitals:  Vitals:    05/10/17 1125 05/10/17 1130 05/10/17 1145   BP: 103/75 106/76 101/75   Resp:   14   Temp:      SpO2: 97% 97% 100%         Electronically Signed By: Zhen Garcia MD  May 10, 2017  11:59 AM

## 2017-05-10 NOTE — DISCHARGE INSTRUCTIONS
Call MD prior to DC.  No driving today or while on pain meds  Pelvic rest for 2 weeks  Call Dr. Vance [phone] as necessary if problems in interim.  No heavy lifting, vigorous activity, swim, bath, exercise for 2 weeks  Schedule PO check 2 weeks Dr. Vance        Post-Anesthesia Patient Instructions    IMMEDIATELY FOLLOWING SURGERY:  Do not drive or operate machinery for the first twenty four hours after surgery.  Do not make any important decisions for twenty four hours after surgery or while taking narcotic pain medications or sedatives.  If you develop intractable nausea and vomiting or a severe headache please notify your doctor immediately.    FOLLOW-UP:  Please make an appointment with your surgeon as instructed. You do not need to follow up with anesthesia unless specifically instructed to do so.    WOUND CARE INSTRUCTIONS (if applicable):  Keep a dry clean dressing on the anesthesia/puncture wound site if there is drainage.  Once the wound has quit draining you may leave it open to air.  Generally you should leave the bandage intact for twenty four hours unless there is drainage.  If the epidural site drains for more than 36-48 hours please call the anesthesia department.    QUESTIONS?:  Please feel free to call your physician or the hospital  if you have any questions, and they will be happy to assist you.       Remove the scopolamine patch behind your left ear after 24 hours after application.   After removing the patch, wash your hands and the area behind your ear thoroughly with soap and water.   The patch will still contain some medicine after use.   To avoid accidental contact or ingestion by children or pets, fold the used patch in half with the sticky side together and throw away in the trash out of the reach of children and pets.

## 2017-05-10 NOTE — OR NURSING
Pt into PACU, unresponsive upon arrival but able to maintain O2 sats and airway, minimally arouses to touch.

## 2017-05-10 NOTE — INTERVAL H&P NOTE
History and physical reviewed on 5/10/2017.  Patient examined. No interval change in condition.  Hg A1C 6.7.  Ok to proceed.   Consent form reviewed and signed and all questions answered.      Misael Vance MD  9:08 AM

## 2017-05-10 NOTE — OR NURSING
Pain 4 out of 10 after oral pain medications. Pt assisted to dress. Transferred to wheelchair with assistance, and escorted to hospital exit via wheelchair by UA. Discharge instructions and prescriptions discussed and sent home with Pt and her .

## 2017-05-10 NOTE — SIGNIFICANT EVENT
Patient wanting to sleep for about half an hour per     Received 2 hydrocodone see mar will continue to monitor

## 2017-05-11 LAB — COPATH REPORT: NORMAL

## 2017-05-17 ENCOUNTER — OFFICE VISIT (OUTPATIENT)
Dept: FAMILY MEDICINE | Facility: OTHER | Age: 52
End: 2017-05-17
Attending: FAMILY MEDICINE
Payer: COMMERCIAL

## 2017-05-17 VITALS
SYSTOLIC BLOOD PRESSURE: 112 MMHG | DIASTOLIC BLOOD PRESSURE: 82 MMHG | HEART RATE: 81 BPM | OXYGEN SATURATION: 100 % | TEMPERATURE: 97.2 F | WEIGHT: 138 LBS | BODY MASS INDEX: 23.69 KG/M2

## 2017-05-17 DIAGNOSIS — Z98.890 POSTOPERATIVE STATE: Primary | ICD-10-CM

## 2017-05-17 DIAGNOSIS — I10 BENIGN ESSENTIAL HYPERTENSION: ICD-10-CM

## 2017-05-17 DIAGNOSIS — E11.9 TYPE 2 DIABETES MELLITUS WITHOUT COMPLICATION, WITHOUT LONG-TERM CURRENT USE OF INSULIN (H): ICD-10-CM

## 2017-05-17 PROCEDURE — 99214 OFFICE O/P EST MOD 30 MIN: CPT | Mod: 24 | Performed by: FAMILY MEDICINE

## 2017-05-17 PROCEDURE — 99212 OFFICE O/P EST SF 10 MIN: CPT

## 2017-05-17 RX ORDER — MUPIROCIN CALCIUM 20 MG/G
CREAM TOPICAL 3 TIMES DAILY
Qty: 30 G | Refills: 0 | Status: SHIPPED | OUTPATIENT
Start: 2017-05-17 | End: 2021-08-11

## 2017-05-17 ASSESSMENT — ANXIETY QUESTIONNAIRES
1. FEELING NERVOUS, ANXIOUS, OR ON EDGE: SEVERAL DAYS
3. WORRYING TOO MUCH ABOUT DIFFERENT THINGS: NOT AT ALL
2. NOT BEING ABLE TO STOP OR CONTROL WORRYING: SEVERAL DAYS
6. BECOMING EASILY ANNOYED OR IRRITABLE: SEVERAL DAYS
5. BEING SO RESTLESS THAT IT IS HARD TO SIT STILL: NOT AT ALL
GAD7 TOTAL SCORE: 5
IF YOU CHECKED OFF ANY PROBLEMS ON THIS QUESTIONNAIRE, HOW DIFFICULT HAVE THESE PROBLEMS MADE IT FOR YOU TO DO YOUR WORK, TAKE CARE OF THINGS AT HOME, OR GET ALONG WITH OTHER PEOPLE: NOT DIFFICULT AT ALL
7. FEELING AFRAID AS IF SOMETHING AWFUL MIGHT HAPPEN: SEVERAL DAYS

## 2017-05-17 ASSESSMENT — PATIENT HEALTH QUESTIONNAIRE - PHQ9: 5. POOR APPETITE OR OVEREATING: SEVERAL DAYS

## 2017-05-17 ASSESSMENT — PAIN SCALES - GENERAL: PAINLEVEL: NO PAIN (0)

## 2017-05-17 NOTE — NURSING NOTE
"Chief Complaint   Patient presents with     Surgical Followup     DIAGNOSTIC LAPAROSCOPY, WITH LEFT SALPINGO-OOPHORECTOMY,FULGERATION OF ENDOMETRIOSIS, LYSIS OF ADHESIONS     Diabetes       Initial /82  Pulse 81  Temp 97.2  F (36.2  C)  Wt 138 lb (62.6 kg)  SpO2 100%  BMI 23.69 kg/m2 Estimated body mass index is 23.69 kg/(m^2) as calculated from the following:    Height as of 5/10/17: 5' 4\" (1.626 m).    Weight as of this encounter: 138 lb (62.6 kg).  Medication Reconciliation: complete     Aileen Mar      "

## 2017-05-17 NOTE — MR AVS SNAPSHOT
"              After Visit Summary   5/17/2017    Katherine Royal    MRN: [id number]           Patient Information     Date Of Birth          1965        Visit Information        Provider Department      5/17/2017 10:00 AM Delbert Rosas MD [facility] [place]        Today's Diagnoses     Postoperative state    -  1    Type 2 diabetes mellitus without complication, non insulin requiring (H)        HTN (hypertension)          Care Instructions    Continue same medications.            Follow-ups after your visit        Follow-up notes from your care team     Return in about 6 months (around 11/17/2017) for Follow Up Visit, Lab testing.      Your next 10 appointments already scheduled     May 30, 2017 11:30 AM CDT   (Arrive by 11:15 AM)   Post Op with Misael Vance MD   [facility] [place] ([facility])    [address] [place] [address] [place]  [place] [address]   [phone]              Who to contact     If you have questions or need follow up information about today's clinic visit or your schedule please [facility] at [phone].  Normal or non-critical lab and imaging results will be communicated to you by MyChart, letter or phone within 4 business days after the clinic has received the results. If you do not hear from us within 7 days, please contact the clinic through MyChart or phone. If you have a critical or abnormal lab result, we will notify you by phone as soon as possible.  Submit refill requests through [employer] or call your pharmacy and they will forward the refill request to us. Please allow 3 business days for your refill to be completed.          Additional Information About Your Visit        MyChart Information     [employer] lets you send messages to your doctor, view your test results, renew your prescriptions, schedule appointments and more. To sign up, go to [email or web address] . Click on \"Log in\" on the left side of the screen, which will take you to the " "Welcome page. Then click on \"Sign up Now\" on the right side of the page.     You will be asked to enter the access code listed below, as well as some personal information. Please follow the directions to create your username and password.     Your access code is: [email or web address]  Expires: 2017 12:23 PM     Your access code will  in 90 days. If you need help or a new code, please call your [place] clinic or [phone].        Care EveryWhere ID     This is your Care EveryWhere ID. This could be used by other organizations to access your [place] medical records  [id number]        Your Vitals Were     Pulse Temperature Pulse Oximetry BMI (Body Mass Index)          81 97.2  F (36.2  C) 100% 23.69 kg/m2         Blood Pressure from Last 3 Encounters:   17 112/82   05/10/17 100/74   17 104/74    Weight from Last 3 Encounters:   17 138 lb (62.6 kg)   05/10/17 140 lb 6.4 oz (63.7 kg)   17 140 lb (63.5 kg)              Today, you had the following     No orders found for display         Today's Medication Changes          These changes are accurate as of: 17 11:59 PM.  If you have any questions, ask your nurse or doctor.               Start taking these medicines.        Dose/Directions    mupirocin 2 % cream   Commonly known as:  BACTROBAN   Used for:  Postoperative state   Started by:  Delbert Rosas MD        Apply topically 3 times daily   Quantity:  30 g   Refills:  0            Where to get your medicines      These medications were sent to [facility] [address]     Phone:  [phone]     mupirocin 2 % cream                Primary Care Provider Office Phone # Fax #    Jesise Peters [phone]       [facility] [address] [facility] [address]  [facility] [address]        Thank you!     Thank you for choosing [facility]  for your care. Our goal " is always to provide you with excellent care. Hearing back from our patients is one way we can continue to improve our services. Please take a few minutes to complete the written survey that you may receive in the mail after your visit with us. Thank you!             Your Updated Medication List - Protect others around you: Learn how to safely use, store and throw away your medicines at www.disposemymeds.org.          This list is accurate as of: 5/17/17 11:59 PM.  Always use your most recent med list.                   Brand Name Dispense Instructions for use    aspirin EC 81 MG EC tablet      Take 81 mg by mouth daily       atorvastatin 10 MG tablet    LIPITOR     Take 10 mg by mouth daily       blood glucose lancing device          blood glucose monitoring test strip    no brand specified         cloNIDine 0.1 MG tablet    CATAPRES    60 tablet    Take 1 tablet (0.1 mg) by mouth At Bedtime For elevated blood pressure       GEMFIBROZIL PO      Take 600 mg by mouth 2 times daily (before meals)       hydrochlorothiazide 12.5 MG Tabs tablet          HYDROcodone-acetaminophen 5-325 MG per tablet    NORCO    40 tablet    Take 2 tablets by mouth every 6 hours as needed for moderate to severe pain       ibuprofen 600 MG tablet    ADVIL/MOTRIN    30 tablet    Take 1 tablet (600 mg) by mouth every 6 hours as needed for moderate pain       losartan 50 MG tablet    COZAAR     Take 50 mg by mouth       metFORMIN 500 MG tablet    GLUCOPHAGE    180 tablet    Take 1 tablet (500 mg) by mouth 2 times daily (with meals)       mupirocin 2 % cream    BACTROBAN    30 g    Apply topically 3 times daily

## 2017-05-17 NOTE — PROGRESS NOTES
SUBJECTIVE:  Katherine Royal, 51 year old, female is seen in follow up of diabetes mellitus, type 2.  She remains on metformin without complications or sequelae.  Fasting labs are reviewed.    In addition, she has a history of hypertension and has been on angiotensin coverting enzyme inhibitor as well as diuretic.  Katherine uses clonidine as needed for acute elevations.      Follow up left salpingo oophorectemy, lysis of adhesions, and fulguration of endometriosis.  This was performed by Dr Vance.  Her post op course has been uneventful however she developed a low grade fever.  This has now resolved.    Denies chest pain, dyspnea, decreased exercise tolerance, dizzyness, nausea, vomiting, diaphoresis, palpitations, numbness, tingling, or paresthesias.      Current Outpatient Prescriptions   Medication Sig Dispense Refill     HYDROcodone-acetaminophen (NORCO) 5-325 MG per tablet Take 2 tablets by mouth every 6 hours as needed for moderate to severe pain 40 tablet 0     ibuprofen (ADVIL/MOTRIN) 600 MG tablet Take 1 tablet (600 mg) by mouth every 6 hours as needed for moderate pain 30 tablet 1     GEMFIBROZIL PO Take 600 mg by mouth 2 times daily (before meals)       blood glucose (ACCU-CHEK FASTCLIX) lancing device        blood glucose monitoring (NO BRAND SPECIFIED) test strip        aspirin EC 81 MG EC tablet Take 81 mg by mouth daily        atorvastatin (LIPITOR) 10 MG tablet Take 10 mg by mouth daily        hydrochlorothiazide 12.5 MG TABS tablet        losartan (COZAAR) 50 MG tablet Take 50 mg by mouth       metFORMIN (GLUCOPHAGE) 500 MG tablet Take 1 tablet (500 mg) by mouth 2 times daily (with meals) 180 tablet 1     cloNIDine (CATAPRES) 0.1 MG tablet Take 1 tablet (0.1 mg) by mouth At Bedtime For elevated blood pressure 60 tablet 0      No Known Allergies    History reviewed. No pertinent past medical history.  Past Surgical History:   Procedure Laterality Date     LAPAROSCOPY DIAGNOSTIC (GYN) N/A 5/10/2017     Procedure: LAPAROSCOPY DIAGNOSTIC (GYN);  DIAGNOSTIC LAPAROSCOPY, WITH LEFT SALPINGO-OOPHORECTOMY,FULGERATION OF ENDOMETRIOSIS, LYSIS OF ADHESIONS;  Surgeon: Misael Vance MD;  Location: [facility]     History reviewed. No pertinent family history.  Social History     Social History     Marital status:      Spouse name: N/A     Number of children: N/A     Years of education: N/A     Occupational History     Not on file.     Social History Main Topics     Smoking status: Never Smoker     Smokeless tobacco: Not on file     Alcohol use Not on file     Drug use: Not on file     Sexual activity: Not on file     Other Topics Concern     Not on file     Social History Narrative         Review Of Systems  Constitutional, HEENT, cardiovascular, pulmonary, gi and gu systems are negative, except as otherwise noted.    OBJECTIVE:  Vitals: B/P: Data Unavailable, T: Data Unavailable, P: Data Unavailable, R: Data Unavailable    Exam:  Physical Exam   Constitutional: She is oriented to person, place, and time. She appears well-developed and well-nourished. No distress.   HENT:   Head: Normocephalic.   Right Ear: Tympanic membrane, external ear and ear canal normal.   Left Ear: Tympanic membrane, external ear and ear canal normal.   Nose: Nose normal.   Mouth/Throat: Oropharynx is clear and moist.   Eyes: Conjunctivae are normal.   Neck: Neck supple. No JVD present. No thyromegaly present.   Cardiovascular: Normal rate, regular rhythm, normal heart sounds and intact distal pulses.  Exam reveals no gallop and no friction rub.    No murmur heard.  Pulmonary/Chest: Effort normal and breath sounds normal. She has no rales.   Musculoskeletal: She exhibits no edema.   Neurological: She is alert and oriented to person, place, and time.   Skin: Skin is warm and dry.   Psychiatric: She has a normal mood and affect.     Other exam not repeated    Labs:  No results found for any previous visit.    ASSESSMENT/PLAN:  Type 2 diabetes mellitus  without complication, non insulin requiring (H)  Fasting labs are reviewed.  Goal of hemoglobin A1C of less than 7 discussed.  Instructed in the importance of diet, exercise, and good glycemic control in prevention of secondary complications.    Continue same medication regimen. Repeat fasting glucose and hemoglobin A1C in 6 months.       HTN (hypertension)  Goals reviewed.  Continue home BP monitoring and same medication regimen.  Follow up 6 months.      Postoperative state  Will follow.  Discussed with Gynecology nursing and will perform phone follow up.  - mupirocin (BACTROBAN) 2 % cream; Apply topically 3 times daily                  Delbert Roass MD

## 2017-05-18 ENCOUNTER — TELEPHONE (OUTPATIENT)
Dept: OBGYN | Facility: OTHER | Age: 52
End: 2017-05-18

## 2017-05-18 ASSESSMENT — PATIENT HEALTH QUESTIONNAIRE - PHQ9: SUM OF ALL RESPONSES TO PHQ QUESTIONS 1-9: 4

## 2017-05-18 ASSESSMENT — ANXIETY QUESTIONNAIRES: GAD7 TOTAL SCORE: 5

## 2017-05-18 NOTE — TELEPHONE ENCOUNTER
Pt had Diagnostic Lap and LSO on 5/10/17. She saw Dr Rosas and told him she had a fever of 101 a few days ago. Temp was 97.2 at that visit. I spoke with pt today and she denies any fever and states she feels better.

## 2017-05-30 ENCOUNTER — OFFICE VISIT (OUTPATIENT)
Dept: OBGYN | Facility: OTHER | Age: 52
End: 2017-05-30
Attending: OBSTETRICS & GYNECOLOGY
Payer: COMMERCIAL

## 2017-05-30 VITALS
HEIGHT: 64 IN | TEMPERATURE: 97.8 F | SYSTOLIC BLOOD PRESSURE: 118 MMHG | DIASTOLIC BLOOD PRESSURE: 88 MMHG | OXYGEN SATURATION: 98 % | WEIGHT: 141 LBS | HEART RATE: 84 BPM | BODY MASS INDEX: 24.07 KG/M2

## 2017-05-30 DIAGNOSIS — N80.9 ENDOMETRIOSIS: Primary | ICD-10-CM

## 2017-05-30 PROCEDURE — 99212 OFFICE O/P EST SF 10 MIN: CPT | Mod: 25

## 2017-05-30 PROCEDURE — 96372 THER/PROPH/DIAG INJ SC/IM: CPT | Performed by: OBSTETRICS & GYNECOLOGY

## 2017-05-30 PROCEDURE — 99024 POSTOP FOLLOW-UP VISIT: CPT | Performed by: OBSTETRICS & GYNECOLOGY

## 2017-05-30 ASSESSMENT — PAIN SCALES - GENERAL: PAINLEVEL: MILD PAIN (3)

## 2017-05-30 NOTE — MR AVS SNAPSHOT
"              After Visit Summary   5/30/2017    Katherine Royal    MRN: [id number]           Patient Information     Date Of Birth          1965        Visit Information        Provider Department      5/30/2017 11:30 AM Misael Vance MD [place] [facility]        Today's Diagnoses     Endometriosis    -  1      Care Instructions    F/u 3 months          Follow-ups after your visit        Follow-up notes from your care team     Return in about 3 months (around 8/30/2017).      Your next 10 appointments already scheduled     Sep 21, 2017 11:30 AM CDT   (Arrive by 11:15 AM)   SHORT with Misael Vance MD   [facility] ([facility])    [address]  [facility] [address]   [phone]              Who to contact     If you have questions or need follow up information about today's clinic visit or your schedule please contact [facility] at [phone].  Normal or non-critical lab and imaging results will be communicated to you by MyChart, letter or phone within 4 business days after the clinic has received the results. If you do not hear from us within 7 days, please contact the clinic through MyChart or phone. If you have a critical or abnormal lab result, we will notify you by phone as soon as possible.  Submit refill requests through [employer] or call your pharmacy and they will forward the refill request to us. Please allow 3 business days for your refill to be completed.          Additional Information About Your Visit        MyChart Information     [employer] lets you send messages to your doctor, view your test results, renew your prescriptions, schedule appointments and more. To sign up, go to [email or web address] . Click on \"Log in\" on the left side of the screen, which will take you to the Welcome page. Then click on \"Sign up Now\" on the right side of the page.     You will be asked to enter the access code listed below, as well as some personal " "information. Please follow the directions to create your username and password.     Your access code is: [email or web address]  Expires: 2017 12:23 PM     Your access code will  in 90 days. If you need help or a new code, please call your [place] clinic or [phone].        Care EveryWhere ID     This is your Care EveryWhere ID. This could be used by other organizations to access your [place] medical records  [id number]        Your Vitals Were     Pulse Temperature Height Pulse Oximetry BMI (Body Mass Index)       84 97.8  F (36.6  C) (Tympanic) 5' 4\" (1.626 m) 98% 24.2 kg/m2        Blood Pressure from Last 3 Encounters:   17 118/88   17 112/82   05/10/17 100/74    Weight from Last 3 Encounters:   17 141 lb (64 kg)   17 138 lb (62.6 kg)   05/10/17 140 lb 6.4 oz (63.7 kg)              We Performed the Following     LEUPROLIDE ACETATE INJECITON     THER/PROPH/DIAG INJ, SC/IM          Today's Medication Changes          These changes are accurate as of: 17 12:37 PM.  If you have any questions, ask your nurse or doctor.               Start taking these medicines.        Dose/Directions    leuprolide 11.25 MG kit   Commonly known as:  LUPRON DEPOT (3-MONTH)   Used for:  Endometriosis   Started by:  Misael Vance MD        Dose:  11.25 mg   Inject 11.25 mg into the muscle every 3 months   Quantity:  1 each   Refills:  2            Where to get your medicines      Some of these will need a paper prescription and others can be bought over the counter.  Ask your nurse if you have questions.     You don't need a prescription for these medications     leuprolide 11.25 MG kit                Primary Care Provider Office Phone # Fax #    Jessie Peters [phone]       [address] [address]        Thank you!     Thank you for choosing [facility]  for your [facility]. Our goal is always to provide you with excellent " care. Hearing back from our patients is one way we can continue to improve our services. Please take a few minutes to complete the written survey that you may receive in the mail after your visit with us. Thank you!             Your Updated Medication List - Protect others around you: Learn how to safely use, store and throw away your medicines at www.disposemymeds.org.          This list is accurate as of: 5/30/17 12:37 PM.  Always use your most recent med list.                   Brand Name Dispense Instructions for use    aspirin EC 81 MG EC tablet      Take 81 mg by mouth daily       atorvastatin 10 MG tablet    LIPITOR     Take 10 mg by mouth daily       blood glucose lancing device          blood glucose monitoring test strip    no brand specified         cloNIDine 0.1 MG tablet    CATAPRES    60 tablet    Take 1 tablet (0.1 mg) by mouth At Bedtime For elevated blood pressure       GEMFIBROZIL PO      Take 600 mg by mouth 2 times daily (before meals)       hydrochlorothiazide 12.5 MG Tabs tablet          HYDROcodone-acetaminophen 5-325 MG per tablet    NORCO    40 tablet    Take 2 tablets by mouth every 6 hours as needed for moderate to severe pain       ibuprofen 600 MG tablet    ADVIL/MOTRIN    30 tablet    Take 1 tablet (600 mg) by mouth every 6 hours as needed for moderate pain       leuprolide 11.25 MG kit    LUPRON DEPOT (3-MONTH)    1 each    Inject 11.25 mg into the muscle every 3 months       losartan 50 MG tablet    COZAAR     Take 50 mg by mouth       metFORMIN 500 MG tablet    GLUCOPHAGE    180 tablet    Take 1 tablet (500 mg) by mouth 2 times daily (with meals)       mupirocin 2 % cream    BACTROBAN    30 g    Apply topically 3 times daily

## 2017-05-30 NOTE — NURSING NOTE
"Chief Complaint   Patient presents with     Surgical Followup     post-op/ left salpingo-oophorectomy       Initial /88 (BP Location: Left arm, Cuff Size: Adult Regular)  Pulse 84  Temp 97.8  F (36.6  C) (Tympanic)  Ht 5' 4\" (1.626 m)  Wt 141 lb (64 kg)  SpO2 98%  BMI 24.2 kg/m2 Estimated body mass index is 24.2 kg/(m^2) as calculated from the following:    Height as of this encounter: 5' 4\" (1.626 m).    Weight as of this encounter: 141 lb (64 kg).  Medication Reconciliation: complete   Melly Gonzalez      "

## 2017-05-30 NOTE — PROGRESS NOTES
"CORY Royal is a 51 year old female presents for post operative check. She is  3  week(s) status post Laparoscopic LSO and fulguration of endometriosis.  She reports doing well and denies significant pain or bleeding.  Bowel and bladder function is satisfactory. Denies incisional problems. Significant findings endometriosis, left paratubal cyst.  She still notes some LLQ/flank pain.  Wonders if related to kidney stones.    O.  Blood pressure 118/88, pulse 84, temperature 97.8  F (36.6  C), temperature source Tympanic, height 5' 4\" (1.626 m), weight 141 lb (64 kg), SpO2 98 %.    Abd: soft, non-tender, non-distended. Incision clear, dry, and intact without evidence of infection.    A. /P. Satisfactory post-op check.Released from restrictions.  Endometriosis:  Discussed medical treatment options including r/B/Se's.  Pt desires to proceed with Depo-Lupron therapy.  11.25mg given.  F/u 3 months.  Norethindrone rx for SE's.    F/u prn problems in the interim.  F/u with PCP if continued flank pain regarding h/o kidney stones.      Misael Vance  "

## 2017-05-30 NOTE — PROGRESS NOTES
The following medication was given:     MEDICATION: Lupron Depot 11.25 mg  ROUTE: IM  SITE: LUQ - Gluteus  DOSE: 11.25 mg  LOT #: [id number]  :  [employer]  EXPIRATION DATE:  10/26/19  NDC#: 0720-0636-48  Patient will return in 3 months for next injection.    Latoya Borjas

## 2017-07-05 ENCOUNTER — TRANSFERRED RECORDS (OUTPATIENT)
Dept: HEALTH INFORMATION MANAGEMENT | Facility: HOSPITAL | Age: 52
End: 2017-07-05

## 2017-07-06 DIAGNOSIS — R92.8 ABNORMAL FINDING ON BREAST IMAGING: Primary | ICD-10-CM

## 2017-07-10 ENCOUNTER — TELEPHONE (OUTPATIENT)
Dept: FAMILY MEDICINE | Facility: OTHER | Age: 52
End: 2017-07-10

## 2017-07-10 DIAGNOSIS — E11.9 TYPE 2 DIABETES MELLITUS WITHOUT COMPLICATION, WITHOUT LONG-TERM CURRENT USE OF INSULIN (H): Primary | ICD-10-CM

## 2017-07-10 NOTE — TELEPHONE ENCOUNTER
9:31 AM    Reason for Call: Phone Call    Description: Pt called to set her diabetic check up for 08/07/17. She would like to do her fasting labs on Aug 2 at [facility] in [place]. Please call her back at [phone].    Was an appointment offered for this call? Yes    Preferred method for responding to this message: Telephone Call    If we cannot reach you directly, may we leave a detailed response at the number you provided? Yes    Can this message wait until your PCP/provider returns, if available today? Not applicable    Digna Regan

## 2017-07-28 ENCOUNTER — TELEPHONE (OUTPATIENT)
Dept: FAMILY MEDICINE | Facility: OTHER | Age: 52
End: 2017-07-28

## 2017-07-28 DIAGNOSIS — E11.9 TYPE 2 DIABETES MELLITUS WITHOUT COMPLICATION, UNSPECIFIED LONG TERM INSULIN USE STATUS: Primary | ICD-10-CM

## 2017-07-28 DIAGNOSIS — I10 BENIGN ESSENTIAL HYPERTENSION: ICD-10-CM

## 2017-07-28 RX ORDER — LOSARTAN POTASSIUM 50 MG/1
50 TABLET ORAL DAILY
Qty: 30 TABLET | Refills: 0 | Status: SHIPPED | OUTPATIENT
Start: 2017-07-28 | End: 2017-08-07

## 2017-07-28 RX ORDER — HYDROCHLOROTHIAZIDE 12.5 MG/1
12.5 TABLET ORAL DAILY
Qty: 60 TABLET | Refills: 0 | Status: SHIPPED | OUTPATIENT
Start: 2017-07-28 | End: 2017-08-07

## 2017-07-28 RX ORDER — ASPIRIN 81 MG/1
81 TABLET ORAL DAILY
Qty: 100 TABLET | Refills: 0 | Status: SHIPPED | OUTPATIENT
Start: 2017-07-28 | End: 2017-11-13

## 2017-07-28 RX ORDER — ATORVASTATIN CALCIUM 10 MG/1
10 TABLET, FILM COATED ORAL DAILY
Qty: 30 TABLET | Refills: 0 | Status: SHIPPED | OUTPATIENT
Start: 2017-07-28 | End: 2017-08-07

## 2017-07-28 NOTE — TELEPHONE ENCOUNTER
Reason for call:  Medication    1. Medication Name? Aspirin, atorvastatin, Hydrochorothiazide, losartan  2. Is this request for a refill? Yes  3. What Pharmacy do you use? [facility]  4. Have you contacted your pharmacy? Yes    5. If yes, when? 07/27  (Please note that the turn-around-time for prescriptions is 72 business hours; I am sending your request at this time. SEND TO  [facility]  )  Description: Patient would like refill today if possible as she will be out on Sunday.  Was an appointment offered for this a call? No   Preferred method for responding to this messageTelephone Call  If we cannot reach you directly, may we leave a detailed response at the number you provided? Yes  Can this message wait until your PCP/Provider returns if not available today? No

## 2017-07-28 NOTE — TELEPHONE ENCOUNTER
Please see telephone call below.  Asprin, Atorvastatin, Hydrochlorothiazide, and Losartan.  Last office visit: 5/17/17  Last refill: All historically noted and never filled in Epic.  All medications have been pended but please review as I am unsure of dosages.  Please sign if appropriate.  Thank you.

## 2017-07-31 ENCOUNTER — TRANSFERRED RECORDS (OUTPATIENT)
Dept: HEALTH INFORMATION MANAGEMENT | Facility: HOSPITAL | Age: 52
End: 2017-07-31

## 2017-08-02 DIAGNOSIS — I10 BENIGN ESSENTIAL HYPERTENSION: ICD-10-CM

## 2017-08-02 NOTE — TELEPHONE ENCOUNTER
Catapres      Last Written Prescription Date: 12/05/2016  Last Fill Quantity: 60, # refills: 0  Last Office Visit with [facility] prescribing provider: 5/17/2017  Next 5 appointments (look out 90 days)     Aug 07, 2017 11:20 AM CDT   (Arrive by 11:05 AM)   SHORT with Delbert Rosas MD   [facility] [place] ([facility] [place] [facility] [place] )    [address]  [place] [address]   [phone]            Aug 29, 2017 11:00 AM CDT   (Arrive by 10:45 AM)   SHORT with Casper Shah MD   [facility] [place] ([facility] [place] [facility] [place] )    [address]  [place] [address]   [phone]                   Potassium   Date Value Ref Range Status   03/14/2017 4.0 3.5 - 5.3 mEq/L Final     Creatinine   Date Value Ref Range Status   03/14/2017 0.6 (A) 0.7 - 1.4 mg/dL Final     BP Readings from Last 3 Encounters:   05/30/17 118/88   05/17/17 112/82   05/10/17 100/74

## 2017-08-07 ENCOUNTER — OFFICE VISIT (OUTPATIENT)
Dept: FAMILY MEDICINE | Facility: OTHER | Age: 52
End: 2017-08-07
Attending: FAMILY MEDICINE
Payer: COMMERCIAL

## 2017-08-07 ENCOUNTER — HOSPITAL ENCOUNTER (OUTPATIENT)
Dept: ULTRASOUND IMAGING | Facility: HOSPITAL | Age: 52
Discharge: HOME | End: 2017-08-07
Attending: NURSE PRACTITIONER | Admitting: FAMILY MEDICINE
Payer: COMMERCIAL

## 2017-08-07 VITALS
HEART RATE: 87 BPM | DIASTOLIC BLOOD PRESSURE: 66 MMHG | HEIGHT: 62 IN | TEMPERATURE: 98.5 F | BODY MASS INDEX: 23.37 KG/M2 | OXYGEN SATURATION: 98 % | SYSTOLIC BLOOD PRESSURE: 96 MMHG | WEIGHT: 127 LBS

## 2017-08-07 DIAGNOSIS — N20.0 CALCULUS OF KIDNEY: Primary | ICD-10-CM

## 2017-08-07 DIAGNOSIS — R92.8 ABNORMAL MAMMOGRAM: Primary | ICD-10-CM

## 2017-08-07 DIAGNOSIS — E11.9 TYPE 2 DIABETES MELLITUS WITHOUT COMPLICATION, WITHOUT LONG-TERM CURRENT USE OF INSULIN (H): ICD-10-CM

## 2017-08-07 DIAGNOSIS — I10 BENIGN ESSENTIAL HYPERTENSION: ICD-10-CM

## 2017-08-07 DIAGNOSIS — R10.13 ABDOMINAL PAIN, EPIGASTRIC: ICD-10-CM

## 2017-08-07 PROCEDURE — 76642 ULTRASOUND BREAST LIMITED: CPT | Mod: TC | Performed by: RADIOLOGY

## 2017-08-07 PROCEDURE — 99212 OFFICE O/P EST SF 10 MIN: CPT

## 2017-08-07 PROCEDURE — 99214 OFFICE O/P EST MOD 30 MIN: CPT | Mod: 24 | Performed by: FAMILY MEDICINE

## 2017-08-07 PROCEDURE — G0279 TOMOSYNTHESIS, MAMMO: HCPCS | Mod: TC

## 2017-08-07 PROCEDURE — G0204 DX MAMMO INCL CAD BI: HCPCS | Mod: TC

## 2017-08-07 RX ORDER — CLONIDINE HYDROCHLORIDE 0.1 MG/1
TABLET ORAL
Qty: 60 TABLET | Refills: 5 | Status: SHIPPED | OUTPATIENT
Start: 2017-08-07 | End: 2017-08-07

## 2017-08-07 RX ORDER — ATORVASTATIN CALCIUM 10 MG/1
10 TABLET, FILM COATED ORAL DAILY
Qty: 90 TABLET | Refills: 3 | Status: SHIPPED | OUTPATIENT
Start: 2017-08-07 | End: 2018-04-06 | Stop reason: DRUGHIGH

## 2017-08-07 RX ORDER — HYDROCHLOROTHIAZIDE 12.5 MG/1
12.5 TABLET ORAL DAILY
Qty: 90 TABLET | Refills: 3 | Status: SHIPPED | OUTPATIENT
Start: 2017-08-07 | End: 2018-06-26

## 2017-08-07 RX ORDER — LOSARTAN POTASSIUM 50 MG/1
75 TABLET ORAL DAILY
Qty: 135 TABLET | Refills: 3 | Status: SHIPPED | OUTPATIENT
Start: 2017-08-07 | End: 2018-06-26

## 2017-08-07 RX ORDER — CLONIDINE HYDROCHLORIDE 0.1 MG/1
TABLET ORAL
Qty: 90 TABLET | Refills: 5 | Status: SHIPPED | OUTPATIENT
Start: 2017-08-07 | End: 2018-06-26

## 2017-08-07 ASSESSMENT — ANXIETY QUESTIONNAIRES
IF YOU CHECKED OFF ANY PROBLEMS ON THIS QUESTIONNAIRE, HOW DIFFICULT HAVE THESE PROBLEMS MADE IT FOR YOU TO DO YOUR WORK, TAKE CARE OF THINGS AT HOME, OR GET ALONG WITH OTHER PEOPLE: NOT DIFFICULT AT ALL
2. NOT BEING ABLE TO STOP OR CONTROL WORRYING: SEVERAL DAYS
7. FEELING AFRAID AS IF SOMETHING AWFUL MIGHT HAPPEN: SEVERAL DAYS
GAD7 TOTAL SCORE: 5
3. WORRYING TOO MUCH ABOUT DIFFERENT THINGS: NOT AT ALL
5. BEING SO RESTLESS THAT IT IS HARD TO SIT STILL: NOT AT ALL
6. BECOMING EASILY ANNOYED OR IRRITABLE: SEVERAL DAYS
1. FEELING NERVOUS, ANXIOUS, OR ON EDGE: SEVERAL DAYS

## 2017-08-07 ASSESSMENT — PAIN SCALES - GENERAL: PAINLEVEL: MILD PAIN (3)

## 2017-08-07 ASSESSMENT — PATIENT HEALTH QUESTIONNAIRE - PHQ9
5. POOR APPETITE OR OVEREATING: SEVERAL DAYS
SUM OF ALL RESPONSES TO PHQ QUESTIONS 1-9: 6

## 2017-08-07 NOTE — MR AVS SNAPSHOT
After Visit Summary   8/7/2017    Katherine Royal    MRN: [id number]           Patient Information     Date Of Birth          1965        Visit Information        Provider Department      8/7/2017 11:20 AM Delbert Rosas MD [facility]        Today's Diagnoses     Calculus of kidney    -  1    Type 2 diabetes mellitus without complication, non insulin requiring (H)        HTN (hypertension)        Abdominal pain, epigastric          Care Instructions    Radiology will call to schedule ultrasound of gallbladder.  Appointment with Urology scheduled for evaluation and recommendations for further management. .           Follow-ups after your visit        Additional Services     UROLOGY ADULT REFERRAL       Your provider has referred you to: Dr Sweeney  Urology in [place]    Please be aware that coverage of these services is subject to the terms and limitations of your health insurance plan.  Call member services at your health plan with any benefit or coverage questions.      Please bring the following with you to your appointment:    (1) Any X-Rays, CTs or MRIs which have been performed.  Contact the facility where they were done to arrange for  prior to your scheduled appointment.    (2) List of current medications  (3) This referral request   (4) Any documents/labs given to you for this referral                  Follow-up notes from your care team     Return in about 2 weeks (around 8/21/2017) for Test Results.      Your next 10 appointments already scheduled     Aug 29, 2017 11:00 AM CDT   (Arrive by 10:45 AM)   SHORT with Casper Shah MD   [facility] ([facility] )    [address] [place] [address]   [phone]            Aug 29, 2017 11:30 AM CDT   Radiology with HI UNTRASOUND 1   [facility] Ultrasound ([facility] )    [address] [facility] [address]   [phone]            Sep 08, 2017  9:15 AM CDT   Radiology  "with HI UNTRASOUND 1   [facility] Ultrasound ([facility] )    [address] [place] [address]   [phone]            Sep 08, 2017  9:15 AM CDT   Radiology with Need Interventional Radiologist   [facility] Interventional Radiology ([facility] )    [address] [place] [address]   [phone]              Who to contact     If you have questions or need follow up information about today's clinic visit or your schedule please contact [facility] directly at [phone].  Normal or non-critical lab and imaging results will be communicated to you by Pikanotehart, letter or phone within 4 business days after the clinic has received the results. If you do not hear from us within 7 days, please contact the clinic through [employer] or phone. If you have a critical or abnormal lab result, we will notify you by phone as soon as possible.  Submit refill requests through Snapflow or call your pharmacy and they will forward the refill request to us. Please allow 3 business days for your refill to be completed.          Additional Information About Your Visit        [facility] Information     Snapflow lets you send messages to your doctor, view your test results, renew your prescriptions, schedule appointments and more. To sign up, go to [email or web address] . Click on \"Log in\" on the left side of the screen, which will take you to the Welcome page. Then click on \"Sign up Now\" on the right side of the page.     You will be asked to enter the access code listed below, as well as some personal information. Please follow the directions to create your username and password.     Your access code is: [email or web address]  Expires: 11/10/2017  6:17 AM     Your access code will  in 90 days. If you need help or a new code, please call your [facility] or [phone].        Care EveryWhere ID     This is your Care EveryWhere ID. This could be used by other organizations to access your [place] medical " "records  [id number]        Your Vitals Were     Pulse Temperature Height Pulse Oximetry BMI (Body Mass Index)       87 98.5  F (36.9  C) (Tympanic) 5' 2\" (1.575 m) 98% 23.23 kg/m2        Blood Pressure from Last 3 Encounters:   08/07/17 96/66   05/30/17 118/88   05/17/17 112/82    Weight from Last 3 Encounters:   08/07/17 127 lb (57.6 kg)   05/30/17 141 lb (64 kg)   05/17/17 138 lb (62.6 kg)              We Performed the Following     UROLOGY ADULT REFERRAL          Today's Medication Changes          These changes are accurate as of: 8/7/17 11:59 PM.  If you have any questions, ask your nurse or doctor.               These medicines have changed or have updated prescriptions.        Dose/Directions    cloNIDine 0.1 MG tablet   Commonly known as:  CATAPRES   This may have changed:  See the new instructions.   Used for:  Benign essential hypertension   Changed by:  Delbert Rosas MD        TAKE ONE TABLET BY MOUTH AT BEDTIME FOR  ELEVATED  BLOOD  PRESSURE.   Quantity:  90 tablet   Refills:  5       losartan 50 MG tablet   Commonly known as:  COZAAR   This may have changed:  how much to take   Changed by:  Delbert Rosas MD        Dose:  75 mg   Take 1.5 tablets (75 mg) by mouth daily   Quantity:  135 tablet   Refills:  3            Where to get your medicines      These medications were sent to [facility] [address] [place] [address]     Phone:  [phone]     atorvastatin 10 MG tablet    cloNIDine 0.1 MG tablet    hydrochlorothiazide 12.5 MG Tabs tablet    losartan 50 MG tablet    metFORMIN 500 MG tablet                Primary Care Provider Office Phone # Fax #    Jessie Peters [phone]       [facility] [address] [address] [place] [address]        Equal Access to Services     SRUTHI FISHER AH: Dorothy patterson Solawrence, waaxda luqadaha, qaybta kaalmastar charles, calvin weldon " florence swannaaayde ah. So [facility] [phone].    ATENCIÓN: Si tova hernandez, tiene a henson disposición servicios gratuitos de asistencia lingüística. Gloria laughlin [phone].    We comply with applicable federal civil rights laws and [place] laws. We do not discriminate on the basis of race, color, national origin, age, disability sex, sexual orientation or gender identity.            Thank you!     Thank you for choosing [facility]  for your [facility]. Our goal is always to provide you with excellent care. Hearing back from our patients is one way we can continue to improve our services. Please take a few minutes to complete the written survey that you may receive in the mail after your visit with us. Thank you!             Your Updated Medication List - Protect others around you: Learn how to safely use, store and throw away your medicines at www.disposemymeds.org.          This list is accurate as of: 8/7/17 11:59 PM.  Always use your most recent med list.                   Brand Name Dispense Instructions for use Diagnosis    aspirin EC 81 MG EC tablet     100 tablet    Take 1 tablet (81 mg) by mouth daily    Type 2 diabetes mellitus without complication, unspecified long term insulin use status (H)       atorvastatin 10 MG tablet    LIPITOR    90 tablet    Take 1 tablet (10 mg) by mouth daily        blood glucose lancing device           blood glucose monitoring test strip    no brand specified          cloNIDine 0.1 MG tablet    CATAPRES    90 tablet    TAKE ONE TABLET BY MOUTH AT BEDTIME FOR  ELEVATED  BLOOD  PRESSURE.    Benign essential hypertension       GEMFIBROZIL PO      Take 600 mg by mouth 2 times daily (before meals)        hydrochlorothiazide 12.5 MG Tabs tablet     90 tablet    Take 1 tablet (12.5 mg) by mouth daily        HYDROcodone-acetaminophen 5-325 MG per tablet    NORCO    40 tablet    Take 2 tablets by mouth every 6 hours as needed for moderate to severe pain    Post-operative state        ibuprofen 600 MG tablet    ADVIL/MOTRIN    30 tablet    Take 1 tablet (600 mg) by mouth every 6 hours as needed for moderate pain    Post-operative state       leuprolide 11.25 MG kit    LUPRON DEPOT (3-MONTH)    1 each    Inject 11.25 mg into the muscle every 3 months    Endometriosis       losartan 50 MG tablet    COZAAR    135 tablet    Take 1.5 tablets (75 mg) by mouth daily        metFORMIN 500 MG tablet    GLUCOPHAGE    180 tablet    Take 1 tablet (500 mg) by mouth 2 times daily (with meals)    Type 2 diabetes mellitus without complication, without long-term current use of insulin (H)       mupirocin 2 % cream    BACTROBAN    30 g    Apply topically 3 times daily    Postoperative state

## 2017-08-07 NOTE — NURSING NOTE
"Chief Complaint   Patient presents with     Diabetes     Follow up on diabetes.       Initial BP 96/66 (BP Location: Right arm, Patient Position: Chair, Cuff Size: Adult Regular)  Pulse 87  Temp 98.5  F (36.9  C) (Tympanic)  Ht 5' 2\" (1.575 m)  Wt 127 lb (57.6 kg)  SpO2 98%  BMI 23.23 kg/m2 Estimated body mass index is 23.23 kg/(m^2) as calculated from the following:    Height as of this encounter: 5' 2\" (1.575 m).    Weight as of this encounter: 127 lb (57.6 kg).  Medication Reconciliation: complete   Siria Levine LPN    "

## 2017-08-07 NOTE — MR AVS SNAPSHOT
After Visit Summary   8/7/2017    Katherine Royal    MRN: [id number]           Patient Information     Date Of Birth          1965        Visit Information        Provider Department      8/7/2017 11:20 AM Delbert Rosas MD [facility]        Today's Diagnoses     Calculus of kidney    -  1    Type 2 diabetes mellitus without complication, non insulin requiring (H)        HTN (hypertension)        Abdominal pain, epigastric          Care Instructions    Radiology will call to schedule ultrasound of gallbladder.  Appointment with Urology scheduled for evaluation and recommendations for further management. .           Follow-ups after your visit        Additional Services     UROLOGY ADULT REFERRAL       Your provider has referred you to: Dr Sweeney  Urology in [place]    Please be aware that coverage of these services is subject to the terms and limitations of your health insurance plan.  Call member services at your health plan with any benefit or coverage questions.      Please bring the following with you to your appointment:    (1) Any X-Rays, CTs or MRIs which have been performed.  Contact the facility where they were done to arrange for  prior to your scheduled appointment.    (2) List of current medications  (3) This referral request   (4) Any documents/labs given to you for this referral                  Follow-up notes from your care team     Return in about 2 weeks (around 8/21/2017) for Test Results.      Your next 10 appointments already scheduled     Aug 29, 2017 11:00 AM CDT   (Arrive by 10:45 AM)   SHORT with Casper Shah MD   [facility] ([facility] )    [address] [place] [address]   [phone]            Aug 29, 2017 11:30 AM CDT   Radiology with HI UNTRASOUND 1   [facility] Ultrasound ([facility] )    [address] [facility] [address]   [phone]            Sep 08, 2017  9:15 AM CDT   Radiology  "with HI UNTRASOUND 1   [facility] Ultrasound ([facility] )    [address] [place] [address]   [phone]            Sep 08, 2017  9:15 AM CDT   Radiology with Need Interventional Radiologist   [facility] Interventional Radiology ([facility] )    [address] [place] [address]   [phone]              Who to contact     If you have questions or need follow up information about today's clinic visit or your schedule please contact [facility] directly at [phone].  Normal or non-critical lab and imaging results will be communicated to you by Convrrthart, letter or phone within 4 business days after the clinic has received the results. If you do not hear from us within 7 days, please contact the clinic through Coursmost or phone. If you have a critical or abnormal lab result, we will notify you by phone as soon as possible.  Submit refill requests through LaComunity or call your pharmacy and they will forward the refill request to us. Please allow 3 business days for your refill to be completed.          Additional Information About Your Visit        [email or web address] Information     LaComunity lets you send messages to your doctor, view your test results, renew your prescriptions, schedule appointments and more. To sign up, go to [email or web address] . Click on \"Log in\" on the left side of the screen, which will take you to the Welcome page. Then click on \"Sign up Now\" on the right side of the page.     You will be asked to enter the access code listed below, as well as some personal information. Please follow the directions to create your username and password.     Your access code is: [email or web address]  Expires: 11/10/2017  6:17 AM     Your access code will  in 90 days. If you need help or a new code, please call your [facility] or [phone].        Care EveryWhere ID     This is your Care EveryWhere ID. This could be used by other organizations to access your [place] medical " "records  [id number]        Your Vitals Were     Pulse Temperature Height Pulse Oximetry BMI (Body Mass Index)       87 98.5  F (36.9  C) (Tympanic) 5' 2\" (1.575 m) 98% 23.23 kg/m2        Blood Pressure from Last 3 Encounters:   08/07/17 96/66   05/30/17 118/88   05/17/17 112/82    Weight from Last 3 Encounters:   08/07/17 127 lb (57.6 kg)   05/30/17 141 lb (64 kg)   05/17/17 138 lb (62.6 kg)              We Performed the Following     UROLOGY ADULT REFERRAL          Today's Medication Changes          These changes are accurate as of: 8/7/17 11:59 PM.  If you have any questions, ask your nurse or doctor.               These medicines have changed or have updated prescriptions.        Dose/Directions    cloNIDine 0.1 MG tablet   Commonly known as:  CATAPRES   This may have changed:  See the new instructions.   Used for:  Benign essential hypertension   Changed by:  Delbert Rosas MD        TAKE ONE TABLET BY MOUTH AT BEDTIME FOR  ELEVATED  BLOOD  PRESSURE.   Quantity:  90 tablet   Refills:  5       losartan 50 MG tablet   Commonly known as:  COZAAR   This may have changed:  how much to take   Changed by:  Delbert Rosas MD        Dose:  75 mg   Take 1.5 tablets (75 mg) by mouth daily   Quantity:  135 tablet   Refills:  3            Where to get your medicines      These medications were sent to [facility] [address] [place] [address]     Phone:  [phone]     atorvastatin 10 MG tablet    cloNIDine 0.1 MG tablet    hydrochlorothiazide 12.5 MG Tabs tablet    losartan 50 MG tablet    metFORMIN 500 MG tablet                Primary Care Provider Office Phone # Fax #    Jessie Peters [phone]       [facility] [address] [facility] [address] [place] [address]        Equal Access to Services     SRUTHI FISHER AH: Dorothy patterson Solawrence, waaxda luqadaha, qaybta kaalmastar charles, calvin weldon " florence swannaaayde ah. So [facility] [phone].    ATENCIÓN: Si tova hernandez, tiene a henson disposición servicios gratuitos de asistencia lingüística. Gloria laughlin [phone].    We comply with applicable federal civil rights laws and [place] laws. We do not discriminate on the basis of race, color, national origin, age, disability sex, sexual orientation or gender identity.            Thank you!     Thank you for choosing [facility] [place]  for your care. Our goal is always to provide you with excellent care. Hearing back from our patients is one way we can continue to improve our services. Please take a few minutes to complete the written survey that you may receive in the mail after your visit with us. Thank you!             Your Updated Medication List - Protect others around you: Learn how to safely use, store and throw away your medicines at www.disposemymeds.org.          This list is accurate as of: 8/7/17 11:59 PM.  Always use your most recent med list.                   Brand Name Dispense Instructions for use Diagnosis    aspirin EC 81 MG EC tablet     100 tablet    Take 1 tablet (81 mg) by mouth daily    Type 2 diabetes mellitus without complication, unspecified long term insulin use status (H)       atorvastatin 10 MG tablet    LIPITOR    90 tablet    Take 1 tablet (10 mg) by mouth daily        blood glucose lancing device           blood glucose monitoring test strip    no brand specified          cloNIDine 0.1 MG tablet    CATAPRES    90 tablet    TAKE ONE TABLET BY MOUTH AT BEDTIME FOR  ELEVATED  BLOOD  PRESSURE.    Benign essential hypertension       GEMFIBROZIL PO      Take 600 mg by mouth 2 times daily (before meals)        hydrochlorothiazide 12.5 MG Tabs tablet     90 tablet    Take 1 tablet (12.5 mg) by mouth daily        HYDROcodone-acetaminophen 5-325 MG per tablet    NORCO    40 tablet    Take 2 tablets by mouth every 6 hours as needed for moderate to severe pain    Post-operative state        ibuprofen 600 MG tablet    ADVIL/MOTRIN    30 tablet    Take 1 tablet (600 mg) by mouth every 6 hours as needed for moderate pain    Post-operative state       leuprolide 11.25 MG kit    LUPRON DEPOT (3-MONTH)    1 each    Inject 11.25 mg into the muscle every 3 months    Endometriosis       losartan 50 MG tablet    COZAAR    135 tablet    Take 1.5 tablets (75 mg) by mouth daily        metFORMIN 500 MG tablet    GLUCOPHAGE    180 tablet    Take 1 tablet (500 mg) by mouth 2 times daily (with meals)    Type 2 diabetes mellitus without complication, without long-term current use of insulin (H)       mupirocin 2 % cream    BACTROBAN    30 g    Apply topically 3 times daily    Postoperative state

## 2017-08-07 NOTE — PROGRESS NOTES
SUBJECTIVE:  Katherine Royal, 51 year old, female is seen in follow up of multiple medical problems.      Diabetes mellitus, Type 2.  Katherine has a history of type 2 DM and has been maintained on metformin without complications or sequelae.  Home BG monitoring is reviewed and shows good control. She has been checking home blood glucose once per day. This frequency of testing is due to good control. Most recent hemoglobin A1C was 6.7.  Most recent eye exam was prior and showed no signs of retinopathy.  In addition, she notes no signs of diabetic nephropathy or neuropathy.  Statin therapy, ACE inhibitor and ASA use reviewed and discussed.    Hypertension.  Home BP monitoring is reviewed and shows good control.  She has required occasional clonidine as previously prescribed by another physician.  Antihypertensive medications include hydrochlorothiazide, losartan, and clonidine.  Most recent EKG showed no ischemic changes  Urinalysis was reviewed and showed no evidence of glomerular disease. There are no signs of LVH or diastolic dysfunction.    Abdominal pain.  She continues with abdominal pain.  This is in the umbilical region.  Katherine has had mild discharge.  She previously underwent laparoscopy.    Renal calculus.  She has had intermittent left flank pain. Previous CT scan showed solitary calculcus on each side with the larger on the left measuing 7 mm.    Denies chest pain, dyspnea, decreased exercise tolerance, dizzyness, nausea, vomiting, diaphoresis, palpitations, numbness, tingling, or paresthesias.      Current Outpatient Prescriptions   Medication Sig Dispense Refill     cloNIDine (CATAPRES) 0.1 MG tablet TAKE ONE TABLET BY MOUTH AT BEDTIME FOR  ELEVATED  BLOOD  PRESSURE. 60 tablet 5     aspirin EC 81 MG EC tablet Take 1 tablet (81 mg) by mouth daily 100 tablet 0     atorvastatin (LIPITOR) 10 MG tablet Take 1 tablet (10 mg) by mouth daily 30 tablet 0     hydrochlorothiazide 12.5 MG TABS tablet Take 1 tablet (12.5  mg) by mouth daily 60 tablet 0     losartan (COZAAR) 50 MG tablet Take 1 tablet (50 mg) by mouth daily 30 tablet 0     mupirocin (BACTROBAN) 2 % cream Apply topically 3 times daily 30 g 0     blood glucose (ACCU-CHEK FASTCLIX) lancing device        blood glucose monitoring (NO BRAND SPECIFIED) test strip        metFORMIN (GLUCOPHAGE) 500 MG tablet Take 1 tablet (500 mg) by mouth 2 times daily (with meals) 180 tablet 1     leuprolide (LUPRON DEPOT, 3-MONTH,) 11.25 MG kit Inject 11.25 mg into the muscle every 3 months (Patient not taking: Reported on 8/7/2017) 1 each 2     HYDROcodone-acetaminophen (NORCO) 5-325 MG per tablet Take 2 tablets by mouth every 6 hours as needed for moderate to severe pain (Patient not taking: Reported on 5/30/2017) 40 tablet 0     ibuprofen (ADVIL/MOTRIN) 600 MG tablet Take 1 tablet (600 mg) by mouth every 6 hours as needed for moderate pain (Patient not taking: Reported on 5/30/2017) 30 tablet 1     GEMFIBROZIL PO Take 600 mg by mouth 2 times daily (before meals)        No Known Allergies    History reviewed. No pertinent past medical history.  Past Surgical History:   Procedure Laterality Date     APPENDECTOMY       LAPAROSCOPY DIAGNOSTIC (GYN) N/A 5/10/2017    Procedure: LAPAROSCOPY DIAGNOSTIC (GYN);  DIAGNOSTIC LAPAROSCOPY, WITH LEFT SALPINGO-OOPHORECTOMY,FULGERATION OF ENDOMETRIOSIS, LYSIS OF ADHESIONS;  Surgeon: Misael Vance MD;  Location: [facility]     Family History   Problem Relation Age of Onset     DIABETES Mother      Hypertension Mother      DIABETES Father      Social History     Social History     Marital status:      Spouse name: N/A     Number of children: N/A     Years of education: N/A     Occupational History     Not on file.     Social History Main Topics     Smoking status: Never Smoker     Smokeless tobacco: Never Used     Alcohol use No     Drug use: No     Sexual activity: Not on file     Other Topics Concern     Not on file     Social History Narrative  "        Review Of Systems  Constitutional, HEENT, cardiovascular, pulmonary, gi and gu systems are negative, except as otherwise noted.      OBJECTIVE:BP 96/66 (BP Location: Right arm, Patient Position: Chair, Cuff Size: Adult Regular)  Pulse 87  Temp 98.5  F (36.9  C) (Tympanic)  Ht 5' 2\" (1.575 m)  Wt 127 lb (57.6 kg)  SpO2 98%  BMI 23.23 kg/m2      Exam:  Physical Exam   Constitutional: She is oriented to person, place, and time. She appears well-developed and well-nourished. No distress.   Abdominal: Soft. Bowel sounds are normal. She exhibits no mass. There is no hepatosplenomegaly. There is tenderness in the periumbilical area. There is no CVA tenderness. No hernia.   Neurological: She is alert and oriented to person, place, and time.   Psychiatric: She has a normal mood and affect.     Other exam not repeated    Labs:  Admission on 05/10/2017, Discharged on 05/10/2017   Component Date Value Ref Range Status     ABO 05/10/2017 B   Final     RH(D) 05/10/2017  Pos   Final     Antibody Screen 05/10/2017 Neg   Final     Test Valid Only At 05/10/2017 [facility]   Final     Specimen Expires 05/10/2017 05/13/2017   Final     Hemoglobin A1C 05/10/2017 6.7* 4.3 - 6.0 % Final     HCG Qual Urine 05/10/2017 Negative  NEG Final     Estimated Average Glucose 05/10/2017 146  mg/dL Final     Copath Report 05/10/2017    Final                    Value:Patient Name: MESERET ROMAN  MR#: [id number]  Specimen #:   Collected: 5/10/2017  Received: 5/10/2017  Reported: 5/11/2017 11:46  Ordering Phy(s): CHANDANA THOMPSON  Copy To: Jessie Peters  [facility]  [phone]    For improved result formatting, select 'View Enhanced Report Format'  under Linked Documents section.    SPECIMEN(S):  Fallopian tube and ovary, left    FINAL DIAGNOSIS:  Left ovary and fallopian tube, salpingo-oophorectomy  - Paratubal cyst  - Corpora albicans  - Fallopian tube    Electronically signed out by:    Ra" EMANUEL Murillo.    CLINICAL HISTORY:  Pelvic pain in female, dyspareunia, female, ovarian cyst; diagnostic  laparoscopy, with left salpingo-oophorectomy, fulguration of  endometriosis, lysis of adhesions.    GROSS:  The specimen is a solid lobulated piece of tan soft tissue and an  attached cylindrical structure which is purple.  The tissue is  consistent with an ovary and fallopian tube.  The ovary is 2.6 x 1.5 x 1  cm.  The fallopian tub                          e is 4.6 x 1 x 0.5 cm with a 1 cm paratubal  unilocular thin-walled cyst.  Sectioning the ovary it has a light to  dark tan appearance. (3 in 2 blocks). (Dictated by: Ra Murillo MD  5/10/2017 04:08 PM)    MICROSCOPIC:  Sections of the ovaries show corpora albicans.  There are some old  luteal cysts.  There is unremarkable fallopian tube with a simple  paratubal cyst.    CPT Codes  A: 36587-JU2    TESTING LAB LOCATION:  [address] [address]  [phone]    COLLECTION SITE:  Client: [facility]  Location: [facility] (B)       Glucose 05/10/2017 138* 70 - 99 mg/dL Final       ASSESSMENT/PLAN:  Type 2 diabetes mellitus without complication, non insulin requiring (H)  Good control  Reviewed results.  Continue metformin as written.  Follow up 6 months.  - metFORMIN (GLUCOPHAGE) 500 MG tablet; Take 1 tablet (500 mg) by mouth 2 times daily (with meals)    HTN (hypertension)  Controlled.  Continue same medications as outlined.  - cloNIDine (CATAPRES) 0.1 MG tablet; TAKE ONE TABLET BY MOUTH AT BEDTIME FOR  ELEVATED  BLOOD  PRESSURE.    Abdominal pain, epigastric  Some umbilical tenderness as well as epigastric.  Ultrasound scheduled to rule out cholelithiasis as cause of epigastric symptoms.  Continue to follow umbilical wound.  - US Abdomen Complete; Future  - US Abdomen Complete    Calculus of kidney  She has had intermittent flank pain.  CT reviewed.  Appointment with Urology scheduled for evaluation  and recommendations for further management.   - UROLOGY ADULT REFERRAL            Delbert Rosas MD

## 2017-08-08 ENCOUNTER — AMBULATORY - GICH (OUTPATIENT)
Dept: SCHEDULING | Facility: OTHER | Age: 52
End: 2017-08-08

## 2017-08-08 ASSESSMENT — ANXIETY QUESTIONNAIRES: GAD7 TOTAL SCORE: 5

## 2017-08-09 ENCOUNTER — TELEPHONE (OUTPATIENT)
Dept: MAMMOGRAPHY | Facility: OTHER | Age: 52
End: 2017-08-09

## 2017-08-09 NOTE — TELEPHONE ENCOUNTER
Dr. Henry gives the ok to schedule biopsy.  Patient education on what to expect with breast biopsy given to patient and .  Patient requests Sept 8 for breast biopsy.  Appointment given on that day to arrive at 0845 at hospital registration.

## 2017-08-12 NOTE — PATIENT INSTRUCTIONS
Radiology will call to schedule ultrasound of gallbladder.  Appointment with Urology scheduled for evaluation and recommendations for further management. .

## 2017-08-14 ENCOUNTER — TELEPHONE (OUTPATIENT)
Dept: MAMMOGRAPHY | Facility: OTHER | Age: 52
End: 2017-08-14

## 2017-08-14 NOTE — TELEPHONE ENCOUNTER
Patient had previously requested Sept 8 for bilateral breast biopsy, but now requests Sept 22.  Appointment given to arrive at 0900 at hospital registration.

## 2017-08-23 ENCOUNTER — TELEPHONE (OUTPATIENT)
Dept: FAMILY MEDICINE | Facility: OTHER | Age: 52
End: 2017-08-23

## 2017-08-23 NOTE — TELEPHONE ENCOUNTER
8:32 AM    Reason for Call: Phone Call    Description: Pt called and stated she has an ultrasound on 08/29/17 to check gallbladder. She is wondering if she can also be checked for kidney stones during this ultrasound? Please call her back at [phone]    Was an appointment offered for this call? No  If yes : Appointment type              Date    Preferred method for responding to this message: Telephone Call  What is your phone number ?    If we cannot reach you directly, may we leave a detailed response at the number you provided? Yes    Can this message wait until your PCP/provider returns, if available today? YES, pt aware provider out until Artemio Regan

## 2017-08-24 NOTE — TELEPHONE ENCOUNTER
Pt would like an ultrasound done at the same time and the results to go to a provider at [facility]. Was explained to her that the provider at [facility] will not get the results if Dr. Rosas were to order this test. She states she is seeing a provider at [facility] in September and will just stick with his orders than. Nell Partida LPN

## 2017-08-29 ENCOUNTER — OFFICE VISIT (OUTPATIENT)
Dept: OBGYN | Facility: OTHER | Age: 52
End: 2017-08-29
Attending: SPECIALIST
Payer: COMMERCIAL

## 2017-08-29 ENCOUNTER — HOSPITAL ENCOUNTER (OUTPATIENT)
Dept: ULTRASOUND IMAGING | Facility: HOSPITAL | Age: 52
Discharge: HOME | End: 2017-08-29
Attending: FAMILY MEDICINE | Admitting: FAMILY MEDICINE
Payer: COMMERCIAL

## 2017-08-29 VITALS
BODY MASS INDEX: 23.56 KG/M2 | HEIGHT: 64 IN | HEART RATE: 72 BPM | DIASTOLIC BLOOD PRESSURE: 62 MMHG | SYSTOLIC BLOOD PRESSURE: 112 MMHG | WEIGHT: 138 LBS | TEMPERATURE: 97.2 F

## 2017-08-29 DIAGNOSIS — N80.9 ENDOMETRIOSIS: ICD-10-CM

## 2017-08-29 PROCEDURE — 99213 OFFICE O/P EST LOW 20 MIN: CPT

## 2017-08-29 PROCEDURE — 96372 THER/PROPH/DIAG INJ SC/IM: CPT | Performed by: SPECIALIST

## 2017-08-29 PROCEDURE — 99213 OFFICE O/P EST LOW 20 MIN: CPT | Performed by: SPECIALIST

## 2017-08-29 PROCEDURE — 76700 US EXAM ABDOM COMPLETE: CPT | Mod: TC

## 2017-08-29 RX ORDER — NORETHINDRONE ACETATE 5 MG
5 TABLET ORAL DAILY
Qty: 90 TABLET | Refills: 0 | Status: SHIPPED | OUTPATIENT
Start: 2017-08-29 | End: 2017-11-30

## 2017-08-29 ASSESSMENT — PAIN SCALES - GENERAL: PAINLEVEL: NO PAIN (0)

## 2017-08-29 NOTE — NURSING NOTE
The following medication was given:     MEDICATION: Lupron Depot 11.25 mg  ROUTE: IM  SITE: [facility]  DOSE: 11.25 mg  LOT #: [id number]  :  [employer]  EXPIRATION DATE:  11/8/2019  NDC#: 0074-366-03  KANWAL DENNIS

## 2017-08-29 NOTE — MR AVS SNAPSHOT
After Visit Summary   8/29/2017    Katherine Royal    MRN: [id number]           Patient Information     Date Of Birth          1965        Visit Information        Provider Department      8/29/2017 11:00 AM Casper Shah MD [facility]        Today's Diagnoses     Endometriosis           Follow-ups after your visit        Follow-up notes from your care team     Return in about 3 months (around 11/29/2017).      Your next 10 appointments already scheduled     Sep 22, 2017 10:00 AM CDT   Radiology with Need Interventional Radiologist   [facility] ([facility] )    [address] [address]   [phone]            Sep 22, 2017 10:00 AM CDT   Radiology with [facility] UNTRASOUND 1   [facility] Ultrasound ([facility] )    [address] [place] [address]   [phone]            Nov 28, 2017 10:30 AM CST   (Arrive by 10:15 AM)   SHORT with Misael Vance MD   [facility] ([facility] )    [address] [facility] [address]   [phone]              Who to contact     If you have questions or need follow up information about today's clinic visit or your schedule please contact [facility] directly at [phone].  Normal or non-critical lab and imaging results will be communicated to you by MyChart, letter or phone within 4 business days after the clinic has received the results. If you do not hear from us within 7 days, please contact the clinic through [employer] or phone. If you have a critical or abnormal lab result, we will notify you by phone as soon as possible.  Submit refill requests through [employer] or call your pharmacy and they will forward the refill request to us. Please allow 3 business days for your refill to be completed.          Additional Information About Your Visit        [employer] Information     [employer] lets you send messages to your doctor, view your test results, renew  "your prescriptions, schedule appointments and more. To sign up, go to [email or web address] . Click on \"Log in\" on the left side of the screen, which will take you to the Welcome page. Then click on \"Sign up Now\" on the right side of the page.     You will be asked to enter the access code listed below, as well as some personal information. Please follow the directions to create your username and password.     Your access code is: [email or web address]  Expires: 11/10/2017  6:17 AM     Your access code will  in 90 days. If you need help or a new code, please call your [facility] clinic or [phone].        Care EveryWhere ID     This is your Care EveryWhere ID. This could be used by other organizations to access your [facility] medical records  [id number]        Your Vitals Were     Pulse Temperature Height BMI (Body Mass Index)          72 97.2  F (36.2  C) (Tympanic) 5' 4\" (1.626 m) 23.69 kg/m2         Blood Pressure from Last 3 Encounters:   17 112/62   17 96/66   17 118/88    Weight from Last 3 Encounters:   17 138 lb (62.6 kg)   17 127 lb (57.6 kg)   17 141 lb (64 kg)              We Performed the Following     THER/PROPH/DIAG INJ, SC/IM          Today's Medication Changes          These changes are accurate as of: 17 12:20 PM.  If you have any questions, ask your nurse or doctor.               Start taking these medicines.        Dose/Directions    norethindrone 5 MG tablet   Commonly known as:  AYGESTIN   Used for:  Endometriosis   Started by:  Casper Shah MD        Dose:  5 mg   Take 1 tablet (5 mg) by mouth daily   Quantity:  90 tablet   Refills:  0         These medicines have changed or have updated prescriptions.        Dose/Directions    metFORMIN 500 MG tablet   Commonly known as:  GLUCOPHAGE   This may have changed:  how much to take   Used for:  Type 2 diabetes mellitus without complication, without long-term current use of insulin (H)        Dose:  500 " mg   Take 1 tablet (500 mg) by mouth 2 times daily (with meals)   Quantity:  180 tablet   Refills:  3            Where to get your medicines      These medications were sent to [facility] [address] [address]     Phone:  [phone]     norethindrone 5 MG tablet         Some of these will need a paper prescription and others can be bought over the counter.  Ask your nurse if you have questions.     You don't need a prescription for these medications     leuprolide 11.25 MG kit                Primary Care Provider Office Phone # Fax #    Delbert Rosas [phone]       [facility] [address] [facility] [address]        Equal Access to Services     SRUTHI FISHER : Dorothy casianoo Solawrence, waaxda luqadaha, qaybta kaalmada adeegyada, calvin paulson. So [facility] [phone].    ATENCIÓN: Si habla español, tiene a henson disposición servicios gratuitos de asistencia lingüística. [facility] [phone].    We comply with applicable federal civil rights laws and [place] laws. We do not discriminate on the basis of race, color, national origin, age, disability sex, sexual orientation or gender identity.            Thank you!     Thank you for choosing [facility]  for your care. Our goal is always to provide you with excellent care. Hearing back from our patients is one way we can continue to improve our services. Please take a few minutes to complete the written survey that you may receive in the mail after your visit with us. Thank you!             Your Updated Medication List - Protect others around you: Learn how to safely use, store and throw away your medicines at www.disposemymeds.org.          This list is accurate as of: 8/29/17 12:20 PM.  Always use your most recent med list.                   Brand Name Dispense Instructions for use Diagnosis    aspirin EC 81 MG EC  tablet     100 tablet    Take 1 tablet (81 mg) by mouth daily    Type 2 diabetes mellitus without complication, unspecified long term insulin use status (H)       atorvastatin 10 MG tablet    LIPITOR    90 tablet    Take 1 tablet (10 mg) by mouth daily        blood glucose lancing device           blood glucose monitoring test strip    no brand specified          cloNIDine 0.1 MG tablet    CATAPRES    90 tablet    TAKE ONE TABLET BY MOUTH AT BEDTIME FOR  ELEVATED  BLOOD  PRESSURE.    Benign essential hypertension       hydrochlorothiazide 12.5 MG Tabs tablet     90 tablet    Take 1 tablet (12.5 mg) by mouth daily        leuprolide 11.25 MG kit    LUPRON DEPOT (3-MONTH)    1 each    Inject 11.25 mg into the muscle every 3 months    Endometriosis       losartan 50 MG tablet    COZAAR    135 tablet    Take 1.5 tablets (75 mg) by mouth daily        metFORMIN 500 MG tablet    GLUCOPHAGE    180 tablet    Take 1 tablet (500 mg) by mouth 2 times daily (with meals)    Type 2 diabetes mellitus without complication, without long-term current use of insulin (H)       mupirocin 2 % cream    BACTROBAN    30 g    Apply topically 3 times daily    Postoperative state       norethindrone 5 MG tablet    AYGESTIN    90 tablet    Take 1 tablet (5 mg) by mouth daily    Endometriosis       NORETHINDRONE PO      Take 5 mg by mouth daily

## 2017-08-29 NOTE — NURSING NOTE
"Chief Complaint   Patient presents with     RECHECK     Pt of Dr Luong follow up on endometriosis       Initial /62  Pulse 72  Temp 97.2  F (36.2  C) (Tympanic)  Ht 5' 4\" (1.626 m)  Wt 138 lb (62.6 kg)  BMI 23.69 kg/m2 Estimated body mass index is 23.69 kg/(m^2) as calculated from the following:    Height as of this encounter: 5' 4\" (1.626 m).    Weight as of this encounter: 138 lb (62.6 kg).  Medication Reconciliation: complete     KANWAL DENNIS      "

## 2017-08-30 ENCOUNTER — TELEPHONE (OUTPATIENT)
Dept: FAMILY MEDICINE | Facility: OTHER | Age: 52
End: 2017-08-30

## 2017-08-30 NOTE — TELEPHONE ENCOUNTER
9:04 AM    Reason for Call: Phone Call    Description: Patient has a biopsy on 9-22-17 and would like to know when she should stop taking her norethindrone (AYGESTIN) 5 MG tablet?    Was an appointment offered for this call? No  If yes : Appointment type              Date    Preferred method for responding to this message: Telephone Call [phone]  What is your phone number ?    If we cannot reach you directly, may we leave a detailed response at the number you provided? Yes    Can this message wait until your PCP/provider returns, if available today? YES    Chayo Donis

## 2017-08-30 NOTE — TELEPHONE ENCOUNTER
She already has seen gynecologist yesterday but has upcoming breast biopsy on 9/22/17 scheduled with dr Henry and she just wants to know how soon before the procedure should she discontinue her Aygestin 5 mg?

## 2017-09-05 ENCOUNTER — AMBULATORY - GICH (OUTPATIENT)
Dept: UROLOGY | Facility: OTHER | Age: 52
End: 2017-09-05

## 2017-09-05 ENCOUNTER — HISTORY (OUTPATIENT)
Dept: UROLOGY | Facility: OTHER | Age: 52
End: 2017-09-05

## 2017-09-06 ENCOUNTER — TRANSFERRED RECORDS (OUTPATIENT)
Dept: HEALTH INFORMATION MANAGEMENT | Facility: HOSPITAL | Age: 52
End: 2017-09-06

## 2017-09-06 ENCOUNTER — HISTORY (OUTPATIENT)
Dept: UROLOGY | Facility: OTHER | Age: 52
End: 2017-09-06

## 2017-09-06 ENCOUNTER — OFFICE VISIT - GICH (OUTPATIENT)
Dept: UROLOGY | Facility: OTHER | Age: 52
End: 2017-09-06

## 2017-09-06 ENCOUNTER — TELEPHONE (OUTPATIENT)
Dept: FAMILY MEDICINE | Facility: OTHER | Age: 52
End: 2017-09-06

## 2017-09-06 DIAGNOSIS — N20.0 CALCULUS OF KIDNEY: ICD-10-CM

## 2017-09-06 NOTE — TELEPHONE ENCOUNTER
4:27 PM    Reason for Call: Phone Call    Description: Pt calling stating she needs referral for her up coming surgery 09/11/17 [facility] with DR Sweeney. She received a call from her insurance company and they need this ASAP to [facility]  [phone] fax [phone]. Any questions please call her.  Was an appointment offered for this call? No  If yes : Appointment type              Date    Preferred method for responding to this message: Telephone Call  What is your phone number ?    If we cannot reach you directly, may we leave a detailed response at the number you provided? Yes    Can this message wait until your PCP/provider returns, if available today?     Mae Acosta

## 2017-09-07 NOTE — TELEPHONE ENCOUNTER
Patient calling in regards to referral for surgery and stated that the fax number is [phone].

## 2017-09-08 ENCOUNTER — TELEPHONE (OUTPATIENT)
Dept: FAMILY MEDICINE | Facility: OTHER | Age: 52
End: 2017-09-08

## 2017-09-08 NOTE — TELEPHONE ENCOUNTER
Returned call again to let her know I did get the first request the other day and that I did send a request to our insurance referral person Avni, if you could call her once referral is completed. Thank you

## 2017-09-08 NOTE — TELEPHONE ENCOUNTER
Patient is having surgery on Tuesday, Sept 12 at [facility] with Dr. Sweeney.  She needs a referral for her insurance to cover the surgery.  Please call patient.  [phone]

## 2017-09-11 ENCOUNTER — TELEPHONE (OUTPATIENT)
Dept: FAMILY MEDICINE | Facility: OTHER | Age: 52
End: 2017-09-11

## 2017-09-11 ENCOUNTER — HISTORY (OUTPATIENT)
Dept: SURGERY | Facility: OTHER | Age: 52
End: 2017-09-11

## 2017-09-11 NOTE — TELEPHONE ENCOUNTER
Patient advised she will need to speak with [facility] about billing as their facility bills separately from us.

## 2017-09-11 NOTE — TELEPHONE ENCOUNTER
8:28 AM    Reason for Call: Phone Call    Description: Patient needs to speak to nurse regarding something to do with insurance not covering her surgery at [facility] with Dr Sweeney on 9-12-17.    Was an appointment offered for this call? No  If yes : Appointment type              Date    Preferred method for responding to this message: Telephone Call  What is your phone number ? [phone]    If we cannot reach you directly, may we leave a detailed response at the number you provided? Yes    Can this message wait until your PCP/provider returns, if available today? YES    Chayo Donis

## 2017-09-11 NOTE — TELEPHONE ENCOUNTER
Tried calling patient back. No answer then busy signal. Bessie can you check on this and direct to correct person as I don't do anything with the financial part. Thank you

## 2017-09-12 ENCOUNTER — AMBULATORY - GICH (OUTPATIENT)
Dept: RADIOLOGY | Facility: OTHER | Age: 52
End: 2017-09-12

## 2017-09-12 ENCOUNTER — SURGERY (OUTPATIENT)
Dept: SURGERY | Facility: OTHER | Age: 52
End: 2017-09-12

## 2017-09-12 ENCOUNTER — HOSPITAL ENCOUNTER (OUTPATIENT)
Dept: SURGERY | Facility: OTHER | Age: 52
Discharge: HOME | End: 2017-09-12
Attending: UROLOGY | Admitting: UROLOGY

## 2017-09-12 DIAGNOSIS — N20.0 CALCULUS OF KIDNEY: ICD-10-CM

## 2017-09-12 LAB — HCG UR QL: NEGATIVE

## 2017-09-13 ENCOUNTER — TELEPHONE (OUTPATIENT)
Dept: OBGYN | Facility: OTHER | Age: 52
End: 2017-09-13

## 2017-09-13 PROBLEM — F51.01 PRIMARY INSOMNIA: Status: ACTIVE | Noted: 2017-01-17

## 2017-09-13 PROBLEM — R73.03 PREDIABETES: Status: ACTIVE | Noted: 2017-01-17

## 2017-09-13 NOTE — TELEPHONE ENCOUNTER
OK.  She does not have to take norethindrone.  It is just for menopausal symptoms.  She may note increased hot flashes, night sweats, etc after stopping it.

## 2017-09-13 NOTE — TELEPHONE ENCOUNTER
Pt saw Dr Shah on 8/29/17 and received Lupron and Rx for norethindrone 5 mg. She calls today and states she has side effects from the norethindrone. States having wt gain, itching at times no rash and SOB at times. Pt did not express any side effects when seen on 8/29/17. Please advise

## 2017-09-16 LAB
1ST CONSTITUENT - HISTORICAL: NORMAL
2ND CONSTITUENT - HISTORICAL: NORMAL
3RD CONSTITUENT - HISTORICAL: NORMAL
STONE SOURCE - HISTORICAL: NORMAL

## 2017-09-18 ENCOUNTER — TELEPHONE (OUTPATIENT)
Dept: MAMMOGRAPHY | Facility: OTHER | Age: 52
End: 2017-09-18

## 2017-09-18 NOTE — TELEPHONE ENCOUNTER
Patient requests rescheduling Breast biopsy on Oct 27.  Given arrival time of 0830.  Asked patient if she would be able to come sooner, but she says due to work schedule this is what will work out for her.

## 2017-09-20 ENCOUNTER — AMBULATORY - GICH (OUTPATIENT)
Dept: UROLOGY | Facility: OTHER | Age: 52
End: 2017-09-20

## 2017-09-20 ENCOUNTER — HISTORY (OUTPATIENT)
Dept: UROLOGY | Facility: OTHER | Age: 52
End: 2017-09-20

## 2017-09-20 DIAGNOSIS — N20.0 CALCULUS OF KIDNEY: ICD-10-CM

## 2017-10-27 ENCOUNTER — HOSPITAL ENCOUNTER (OUTPATIENT)
Dept: ULTRASOUND IMAGING | Facility: HOSPITAL | Age: 52
Discharge: HOME | End: 2017-10-27
Attending: SURGERY | Admitting: FAMILY MEDICINE
Payer: COMMERCIAL

## 2017-10-27 ENCOUNTER — HOSPITAL ENCOUNTER (OUTPATIENT)
Dept: ULTRASOUND IMAGING | Facility: HOSPITAL | Age: 52
End: 2017-10-27
Attending: SURGERY
Payer: COMMERCIAL

## 2017-10-27 ENCOUNTER — OFFICE VISIT (OUTPATIENT)
Dept: FAMILY MEDICINE | Facility: OTHER | Age: 52
End: 2017-10-27
Attending: FAMILY MEDICINE
Payer: COMMERCIAL

## 2017-10-27 ENCOUNTER — RADIANT APPOINTMENT (OUTPATIENT)
Dept: MAMMOGRAPHY | Facility: OTHER | Age: 52
End: 2017-10-27
Attending: RADIOLOGY
Payer: COMMERCIAL

## 2017-10-27 VITALS
RESPIRATION RATE: 18 BRPM | HEART RATE: 85 BPM | WEIGHT: 135 LBS | HEIGHT: 64 IN | TEMPERATURE: 98.4 F | BODY MASS INDEX: 23.05 KG/M2 | SYSTOLIC BLOOD PRESSURE: 98 MMHG | OXYGEN SATURATION: 98 % | DIASTOLIC BLOOD PRESSURE: 64 MMHG

## 2017-10-27 DIAGNOSIS — F43.22 ADJUSTMENT DISORDER WITH ANXIOUS MOOD: Primary | ICD-10-CM

## 2017-10-27 DIAGNOSIS — Z98.890 STATUS POST BREAST BIOPSY: ICD-10-CM

## 2017-10-27 DIAGNOSIS — E11.9 TYPE 2 DIABETES MELLITUS WITHOUT COMPLICATION, WITHOUT LONG-TERM CURRENT USE OF INSULIN (H): ICD-10-CM

## 2017-10-27 LAB
EST. AVERAGE GLUCOSE BLD GHB EST-MCNC: 137 MG/DL
HBA1C MFR BLD: 6.4 % (ref 4.3–6)

## 2017-10-27 PROCEDURE — 40000788 ZZHCL STATISTIC ESTIMATED AVERAGE GLUCOSE: Mod: ZL | Performed by: FAMILY MEDICINE

## 2017-10-27 PROCEDURE — 99212 OFFICE O/P EST SF 10 MIN: CPT

## 2017-10-27 PROCEDURE — 40000986 ZZH STATISTIC EXAM NOT IN OR

## 2017-10-27 PROCEDURE — 25000125 ZZHC RX 250: Performed by: RADIOLOGY

## 2017-10-27 PROCEDURE — 83036 HEMOGLOBIN GLYCOSYLATED A1C: CPT | Mod: ZL | Performed by: FAMILY MEDICINE

## 2017-10-27 PROCEDURE — 99214 OFFICE O/P EST MOD 30 MIN: CPT | Performed by: FAMILY MEDICINE

## 2017-10-27 PROCEDURE — 36415 COLL VENOUS BLD VENIPUNCTURE: CPT | Mod: ZL | Performed by: FAMILY MEDICINE

## 2017-10-27 PROCEDURE — 88305 TISSUE EXAM BY PATHOLOGIST: CPT | Mod: TC | Performed by: SURGERY

## 2017-10-27 PROCEDURE — 25000125 ZZHC RX 250

## 2017-10-27 PROCEDURE — 27210207 US BREAST BIOPSY VACUUM RIGHT: Mod: TC

## 2017-10-27 PROCEDURE — 19083 BX BREAST 1ST LESION US IMAG: CPT | Mod: 50

## 2017-10-27 RX ORDER — LIDOCAINE HYDROCHLORIDE AND EPINEPHRINE 15; 5 MG/ML; UG/ML
20 INJECTION, SOLUTION EPIDURAL ONCE
Status: COMPLETED | OUTPATIENT
Start: 2017-10-27 | End: 2017-10-27

## 2017-10-27 RX ORDER — DIAZEPAM 5 MG
5 TABLET ORAL ONCE
Status: DISCONTINUED | OUTPATIENT
Start: 2017-10-27 | End: 2017-10-28 | Stop reason: HOSPADM

## 2017-10-27 RX ORDER — LIDOCAINE HYDROCHLORIDE 10 MG/ML
20 INJECTION, SOLUTION EPIDURAL; INFILTRATION; INTRACAUDAL; PERINEURAL ONCE
Status: COMPLETED | OUTPATIENT
Start: 2017-10-27 | End: 2017-10-27

## 2017-10-27 RX ORDER — MULTIPLE VITAMINS W/ MINERALS TAB 9MG-400MCG
1 TAB ORAL DAILY
COMMUNITY
End: 2018-03-16

## 2017-10-27 RX ORDER — LIDOCAINE HYDROCHLORIDE 10 MG/ML
INJECTION, SOLUTION EPIDURAL; INFILTRATION; INTRACAUDAL; PERINEURAL
Status: COMPLETED
Start: 2017-10-27 | End: 2017-10-27

## 2017-10-27 RX ORDER — LORAZEPAM 1 MG/1
1 TABLET ORAL EVERY 8 HOURS PRN
Qty: 20 TABLET | Refills: 0 | Status: SHIPPED | OUTPATIENT
Start: 2017-10-27 | End: 2017-11-30

## 2017-10-27 RX ADMIN — LIDOCAINE HYDROCHLORIDE 60 MG: 10 INJECTION, SOLUTION EPIDURAL; INFILTRATION; INTRACAUDAL; PERINEURAL at 09:29

## 2017-10-27 RX ADMIN — LIDOCAINE HYDROCHLORIDE AND EPINEPHRINE 14 ML: 15; 5 INJECTION, SOLUTION EPIDURAL at 09:29

## 2017-10-27 ASSESSMENT — ANXIETY QUESTIONNAIRES
6. BECOMING EASILY ANNOYED OR IRRITABLE: SEVERAL DAYS
GAD7 TOTAL SCORE: 6
4. TROUBLE RELAXING: SEVERAL DAYS
IF YOU CHECKED OFF ANY PROBLEMS ON THIS QUESTIONNAIRE, HOW DIFFICULT HAVE THESE PROBLEMS MADE IT FOR YOU TO DO YOUR WORK, TAKE CARE OF THINGS AT HOME, OR GET ALONG WITH OTHER PEOPLE: NOT DIFFICULT AT ALL
5. BEING SO RESTLESS THAT IT IS HARD TO SIT STILL: SEVERAL DAYS
3. WORRYING TOO MUCH ABOUT DIFFERENT THINGS: NOT AT ALL
7. FEELING AFRAID AS IF SOMETHING AWFUL MIGHT HAPPEN: SEVERAL DAYS
2. NOT BEING ABLE TO STOP OR CONTROL WORRYING: SEVERAL DAYS
1. FEELING NERVOUS, ANXIOUS, OR ON EDGE: SEVERAL DAYS

## 2017-10-27 ASSESSMENT — PAIN SCALES - GENERAL: PAINLEVEL: NO PAIN (0)

## 2017-10-27 ASSESSMENT — PATIENT HEALTH QUESTIONNAIRE - PHQ9: SUM OF ALL RESPONSES TO PHQ QUESTIONS 1-9: 4

## 2017-10-27 NOTE — IP AVS SNAPSHOT
[facility] ULTR[facility]    [address] [place] [address]    Phone:  [phone]                                       After Visit Summary   10/27/2017    Katherine Royal    MRN: [id number]           After Visit Summary Signature Page     I have received my discharge instructions, and my questions have been answered. I have discussed any challenges I see with this plan with the nurse or doctor.    ..........................................................................................................................................  Patient/Patient Representative Signature      ..........................................................................................................................................  Patient Representative Print Name and Relationship to Patient    ..................................................               ................................................  Date                                            Time    ..........................................................................................................................................  Reviewed by Signature/Title    ...................................................              ..............................................  Date                                                            Time

## 2017-10-27 NOTE — IP AVS SNAPSHOT
MRN:[id number]                      After Visit Summary   10/27/2017    Katherine Royal    MRN: [id number]           Visit Information        Provider Department      10/27/2017  9:00 AM JASMEETN; HIIRRAD; HIUS1 HI ULTRASOUND           Review of your medicines      UNREVIEWED medicines. Ask your doctor about these medicines        Dose / Directions    aspirin EC 81 MG EC tablet   Used for:  Type 2 diabetes mellitus without complication, unspecified long term insulin use status (H)        Dose:  81 mg   Take 1 tablet (81 mg) by mouth daily   Quantity:  100 tablet   Refills:  0       atorvastatin 10 MG tablet   Commonly known as:  LIPITOR        Dose:  10 mg   Take 1 tablet (10 mg) by mouth daily   Quantity:  90 tablet   Refills:  3       cloNIDine 0.1 MG tablet   Commonly known as:  CATAPRES   Used for:  Benign essential hypertension        TAKE ONE TABLET BY MOUTH AT BEDTIME FOR  ELEVATED  BLOOD  PRESSURE.   Quantity:  90 tablet   Refills:  5       hydrochlorothiazide 12.5 MG Tabs tablet        Dose:  12.5 mg   Take 1 tablet (12.5 mg) by mouth daily   Quantity:  90 tablet   Refills:  3       leuprolide 11.25 MG kit   Commonly known as:  LUPRON DEPOT (3-MONTH)   Used for:  Endometriosis        Dose:  11.25 mg   Inject 11.25 mg into the muscle every 3 months   Quantity:  1 each   Refills:  0       losartan 50 MG tablet   Commonly known as:  COZAAR        Dose:  75 mg   Take 1.5 tablets (75 mg) by mouth daily   Quantity:  135 tablet   Refills:  3       metFORMIN 500 MG tablet   Commonly known as:  GLUCOPHAGE   Used for:  Type 2 diabetes mellitus without complication, without long-term current use of insulin (H)        Dose:  500 mg   Take 1 tablet (500 mg) by mouth 2 times daily (with meals)   Quantity:  180 tablet   Refills:  3       Multi-vitamin Tabs tablet        Dose:  1 tablet   Take 1 tablet by mouth daily   Refills:  0       mupirocin 2 % cream   Commonly known as:  BACTROBAN   Used for:   Postoperative state        Apply topically 3 times daily   Quantity:  30 g   Refills:  0       norethindrone 5 MG tablet   Commonly known as:  AYGESTIN   Used for:  Endometriosis        Dose:  5 mg   Take 1 tablet (5 mg) by mouth daily   Quantity:  90 tablet   Refills:  0       NORETHINDRONE PO        Dose:  5 mg   Take 5 mg by mouth daily   Refills:  0         CONTINUE these medicines which have NOT CHANGED        Dose / Directions    blood glucose lancing device        Refills:  0       blood glucose monitoring test strip   Commonly known as:  no brand specified        Refills:  0                Protect others around you: Learn how to safely use, store and throw away your medicines at www.disposemymeds.org.         Follow-ups after your visit        Your next 10 appointments already scheduled     Oct 27, 2017 10:15 AM CDT   MA POST PROCEDURE LEFT with HCMA1   [facility] [place] [facility] ([facility]    [address]  [place] [address]   [phone]           Do not use any powder, lotion or deodorant under your arms or on your breast. If you do, we will ask you to remove it before your exam.  Wear comfortable, two-piece clothing.  If you have any allergies, tell your care team.  Bring any previous mammograms from other facilities or have them mailed to the breast center.            Oct 27, 2017 11:20 AM CDT   (Arrive by 11:05 AM)   SHORT with Delbert Rosas MD   [facility] [place] ([facility] [place] )    [address]  [place] [address]   [phone]            Nov 28, 2017 10:30 AM CST   (Arrive by 10:15 AM)   SHORT with Misael Vance MD   [facility] [place] ([facility]    [address]  [place] [address]   [phone]               Care Instructions        Further instructions from your care team         DISCHARGE INSTRUCTIONS FOR  BREAST BIOPSY    BREAST BIOPSY  A needle was used to locate the breast tissue. Tissue  "was removed to check the cells and be examined by a Pathologist. This will help your doctor plan any further treatment or follow-up. Your doctor will tell you the results of the tests in 3 to 5 days.    Follow these instructions:    Climb stairs slowly and use the hand rail. Avoid extra trips. No running or jumping.    No pushing, pulling or straining (vacuuming, shoveling, sweeping etc.)    You may shower tomorrow, pat the are dry around the tegaderm dressing.    Wear a bra at all times until your breast is fully healed.    Apply ice to the breast during the first few hours following the procedure to relieve swelling and bruising.    Steri strips stay in place for 7-10 days or until they fall off. Do not pull them off.    May remove pressure dressing after 24 hours.    Call your doctor if you have:    increased bleeding or drainage from your incision.    swelling, redness or opening of your incision.    foul smell from your incision or dressing.    red streaks from your incision.    chills or fever over 101 degrees (by mouth).    pain not helped by your pain medication.    trouble or pain with breathing.    any new problems or concerns.     Additional Information About Your Visit        [employer] Information     [employer] lets you send messages to your doctor, view your test results, renew your prescriptions, schedule appointments and more. To sign up, go to [email or web address] . Click on \"Log in\" on the left side of the screen, which will take you to the Welcome page. Then click on \"Sign up Now\" on the right side of the page.     You will be asked to enter the access code listed below, as well as some personal information. Please follow the directions to create your username and password.     Your access code is: [email or web address]  Expires: 11/10/2017  6:17 AM     Your access code will  in 90 days. If you need help or a new code, please call your [facility] clinic or [phone].        Care EveryWhere ID     This " is your Care EveryWhere ID. This could be used by other organizations to access your [facility] medical records  [id number]         Primary Care Provider Office Phone # Fax #    Delbert Rosas [phone]      Equal Access to Services     SRUTHI FISHER : Hadii renetta mejias oho Sostivenali, waaxda luqadaha, qaybta kaalmada adeegyada, calvin duy abelayde thakurhector georgecuate paulson. So [facility] [phone].    ATENCIÓN: Si habla español, tiene a henson disposición servicios gratuitos de asistencia lingüística. Llame al [phone].    We comply with applicable federal civil rights laws and [place] laws. We do not discriminate on the basis of race, color, national origin, age, disability, sex, sexual orientation, or gender identity.            Thank you!     Thank you for choosing [facility] for your care. Our goal is always to provide you with excellent care. Hearing back from our patients is one way we can continue to improve our services. Please take a few minutes to complete the written survey that you may receive in the mail after you visit with us. Thank you!             Medication List: This is a list of all your medications and when to take them. Check marks below indicate your daily home schedule. Keep this list as a reference.      Medications           Morning Afternoon Evening Bedtime As Needed    aspirin EC 81 MG EC tablet   Take 1 tablet (81 mg) by mouth daily                                atorvastatin 10 MG tablet   Commonly known as:  LIPITOR   Take 1 tablet (10 mg) by mouth daily                                blood glucose lancing device                                blood glucose monitoring test strip   Commonly known as:  no brand specified                                cloNIDine 0.1 MG tablet   Commonly known as:  CATAPRES   TAKE ONE TABLET BY MOUTH AT BEDTIME FOR  ELEVATED  BLOOD  PRESSURE.                                hydrochlorothiazide 12.5 MG Tabs tablet   Take 1 tablet (12.5 mg) by  mouth daily                                leuprolide 11.25 MG kit   Commonly known as:  LUPRON DEPOT (3-MONTH)   Inject 11.25 mg into the muscle every 3 months                                losartan 50 MG tablet   Commonly known as:  COZAAR   Take 1.5 tablets (75 mg) by mouth daily                                metFORMIN 500 MG tablet   Commonly known as:  GLUCOPHAGE   Take 1 tablet (500 mg) by mouth 2 times daily (with meals)                                Multi-vitamin Tabs tablet   Take 1 tablet by mouth daily                                mupirocin 2 % cream   Commonly known as:  BACTROBAN   Apply topically 3 times daily                                norethindrone 5 MG tablet   Commonly known as:  AYGESTIN   Take 1 tablet (5 mg) by mouth daily                                NORETHINDRONE PO   Take 5 mg by mouth daily

## 2017-10-27 NOTE — PROGRESS NOTES
Patient here for bilaleral ultrasound breast biopsy.  Procedure explained by myself and by radiologist and all questions answered.  Consent signed.  Pt to biopsy room.  Time out done and biopsies performed.  Clip placed after biopsy.  Clip lot number [id number] for both .  Post clip mammogram performed.  Pressure held to site until bleeding completely stopped.  Steristrips and pressure dressing applied to sites.  Ice packs with instructions on use given to patient.  Verbal and written discharge instructions given to patient and .  Patient states understanding of all discharge instructions.  Pt was discharged to home in stable condition and without evidence of bleeding.  Trisha WHYTE

## 2017-10-27 NOTE — MR AVS SNAPSHOT
"              After Visit Summary   10/27/2017    Katherine Royal    MRN: [id number]           Patient Information     Date Of Birth          1965        Visit Information        Provider Department      10/27/2017 11:20 AM Delbert Rosas MD [facility]        Today's Diagnoses     Adjustment disorder with anxious mood    -  1    Type 2 diabetes mellitus without complication, non insulin requiring (H)          Care Instructions    Continue same medications.            Follow-ups after your visit        Follow-up notes from your care team     Return in about 6 months (around 4/27/2018) for Follow Up Visit.      Your next 10 appointments already scheduled     Nov 28, 2017 10:30 AM CST   (Arrive by 10:15 AM)   SHORT with Misael Vance MD   [facility] [place]    [address]  [place] [address]   [phone]              Who to contact     If you have questions or need follow up information about today's clinic visit or your schedule please contact [facility] directly at [phone].  Normal or non-critical lab and imaging results will be communicated to you by [employer], letter or phone within 4 business days after the clinic has received the results. If you do not hear from us within 7 days, please contact the clinic through [employer] or phone. If you have a critical or abnormal lab result, we will notify you by phone as soon as possible.  Submit refill requests through [employer] or call your pharmacy and they will forward the refill request to us. Please allow 3 business days for your refill to be completed.          Additional Information About Your Visit        [employer] Information     [employer] lets you send messages to your doctor, view your test results, renew your prescriptions, schedule appointments and more. To sign up, go to [email or web address] . Click on \"Log in\" on the left side of the screen, which will take you to the Welcome " "page. Then click on \"Sign up Now\" on the right side of the page.     You will be asked to enter the access code listed below, as well as some personal information. Please follow the directions to create your username and password.     Your access code is: [email or web address]  Expires: 11/10/2017  5:17 AM     Your access code will  in 90 days. If you need help or a new code, please call your [facility] clinic or [phone].        Care EveryWhere ID     This is your Care EveryWhere ID. This could be used by other organizations to access your [facility] medical records  [id number]        Your Vitals Were     Pulse Temperature Respirations Height Pulse Oximetry BMI (Body Mass Index)    85 98.4  F (36.9  C) (Tympanic) 18 5' 4\" (1.626 m) 98% 23.17 kg/m2       Blood Pressure from Last 3 Encounters:   10/27/17 98/64   17 112/62   17 96/66    Weight from Last 3 Encounters:   10/27/17 135 lb (61.2 kg)   17 138 lb (62.6 kg)   17 127 lb (57.6 kg)              We Performed the Following     Estimated Average Glucose     Hemoglobin A1c          Today's Medication Changes          These changes are accurate as of: 10/27/17 11:59 PM.  If you have any questions, ask your nurse or doctor.               Start taking these medicines.        Dose/Directions    LORazepam 1 MG tablet   Commonly known as:  ATIVAN   Used for:  Adjustment disorder with anxious mood   Started by:  Delbert Rosas MD        Dose:  1 mg   Take 1 tablet (1 mg) by mouth every 8 hours as needed for anxiety   Quantity:  20 tablet   Refills:  0         These medicines have changed or have updated prescriptions.        Dose/Directions    metFORMIN 500 MG tablet   Commonly known as:  GLUCOPHAGE   This may have changed:  how much to take   Used for:  Type 2 diabetes mellitus without complication, without long-term current use of insulin (H)        Dose:  500 mg   Take 1 tablet (500 mg) by mouth 2 times daily (with meals)   Quantity:  180 tablet "   Refills:  3            Where to get your medicines      Some of these will need a paper prescription and others can be bought over the counter.  Ask your nurse if you have questions.     Bring a paper prescription for each of these medications     LORazepam 1 MG tablet                Primary Care Provider Office Phone # Fax #    Delbert Rosas [phone]       [facility] [address] [facility] [address]        Equal Access to Services     SRUTHI FISHER : Hadii aad ku hadasho Soomaali, waaxda luqadaha, qaybta kaalmada adeegyada, waxay idiin hayaan adeeg khantolinsh lakaylen . So [facility] [phone].    ATENCIÓN: Si habla español, tiene a henson disposición servicios gratuitos de asistencia lingüística. Llame al [phone].    We comply with applicable federal civil rights laws and [place] laws. We do not discriminate on the basis of race, color, national origin, age, disability, sex, sexual orientation, or gender identity.            Thank you!     Thank you for choosing [facility]  for your care. Our goal is always to provide you with excellent care. Hearing back from our patients is one way we can continue to improve our services. Please take a few minutes to complete the written survey that you may receive in the mail after your visit with us. Thank you!             Your Updated Medication List - Protect others around you: Learn how to safely use, store and throw away your medicines at www.disposemymeds.org.          This list is accurate as of: 10/27/17 11:59 PM.  Always use your most recent med list.                   Brand Name Dispense Instructions for use Diagnosis    aspirin EC 81 MG EC tablet     100 tablet    Take 1 tablet (81 mg) by mouth daily    Type 2 diabetes mellitus without complication, unspecified long term insulin use status (H)       atorvastatin 10 MG tablet    LIPITOR    90 tablet    Take 1 tablet (10 mg) by mouth daily        blood glucose lancing device            blood glucose monitoring test strip    no brand specified          cloNIDine 0.1 MG tablet    CATAPRES    90 tablet    TAKE ONE TABLET BY MOUTH AT BEDTIME FOR  ELEVATED  BLOOD  PRESSURE.    Benign essential hypertension       hydrochlorothiazide 12.5 MG Tabs tablet     90 tablet    Take 1 tablet (12.5 mg) by mouth daily        leuprolide 11.25 MG kit    LUPRON DEPOT (3-MONTH)    1 each    Inject 11.25 mg into the muscle every 3 months    Endometriosis       LORazepam 1 MG tablet    ATIVAN    20 tablet    Take 1 tablet (1 mg) by mouth every 8 hours as needed for anxiety    Adjustment disorder with anxious mood       losartan 50 MG tablet    COZAAR    135 tablet    Take 1.5 tablets (75 mg) by mouth daily        metFORMIN 500 MG tablet    GLUCOPHAGE    180 tablet    Take 1 tablet (500 mg) by mouth 2 times daily (with meals)    Type 2 diabetes mellitus without complication, without long-term current use of insulin (H)       Multi-vitamin Tabs tablet      Take 1 tablet by mouth daily        mupirocin 2 % cream    BACTROBAN    30 g    Apply topically 3 times daily    Postoperative state       norethindrone 5 MG tablet    AYGESTIN    90 tablet    Take 1 tablet (5 mg) by mouth daily    Endometriosis

## 2017-10-27 NOTE — LETTER
[facility]  [address]  [place] [address]  Phone: [phone]    October 27, 2017        Katherine Royal  [address]  [facility] [address]          To whom it may concern:    RE: Katherine Royal    Patient was seen and treated today at our clinic.  Katherine suffers from diabetes type 2 and is currently on oral therapy.  This requires her to check her blood glucose with a finger stick method.  This requires her to have lancets.  Please find her current medication list enclosed.    Please contact me for questions or concerns.      Sincerely,        Delbert Rosas MD

## 2017-10-27 NOTE — PROGRESS NOTES
SUBJECTIVE:  Katherine Royal, 52 year old, female is seen with the following medical problems.    Diabetes mellitus, Type 2.  Katherine has a history of type 2 DM and has been maintained on metformin without complications or sequelae.  Home BG monitoring is reviewed and shows good control. She has been checking home blood glucose once per day. This frequency of testing is due to good control. Most recent hemoglobin A1C was normal.  Most recent eye exam was prior and showed no signs of retinopathy.  In addition, She notes no signs of diabetic nephropathy or neuropathy.  Statin therapy, ACE inhibitor and ASA use reviewed and discussed.  She will be traveling back to the [facility] for 2 months and needs letter.    Anxiety.  Katherine has had significant anxiety surround her medical issues including breast biopsy.  CHRISTINA 7 and PHQ 9 are reviewed.    Denies chest pain, dyspnea, decreased exercise tolerance, dizzyness, nausea, vomiting, diaphoresis, palpitations, numbness, tingling, or paresthesias.     Current Outpatient Prescriptions   Medication Sig Dispense Refill     multivitamin, therapeutic with minerals (MULTI-VITAMIN) TABS tablet Take 1 tablet by mouth daily       leuprolide (LUPRON DEPOT, 3-MONTH,) 11.25 MG kit Inject 11.25 mg into the muscle every 3 months 1 each 0     norethindrone (AYGESTIN) 5 MG tablet Take 1 tablet (5 mg) by mouth daily 90 tablet 0     losartan (COZAAR) 50 MG tablet Take 1.5 tablets (75 mg) by mouth daily 135 tablet 3     atorvastatin (LIPITOR) 10 MG tablet Take 1 tablet (10 mg) by mouth daily 90 tablet 3     hydrochlorothiazide 12.5 MG TABS tablet Take 1 tablet (12.5 mg) by mouth daily 90 tablet 3     cloNIDine (CATAPRES) 0.1 MG tablet TAKE ONE TABLET BY MOUTH AT BEDTIME FOR  ELEVATED  BLOOD  PRESSURE. 90 tablet 5     metFORMIN (GLUCOPHAGE) 500 MG tablet Take 1 tablet (500 mg) by mouth 2 times daily (with meals) (Patient taking differently: Take 1,000 mg by mouth 2 times daily (with meals) ) 180  "tablet 3     aspirin EC 81 MG EC tablet Take 1 tablet (81 mg) by mouth daily 100 tablet 0     mupirocin (BACTROBAN) 2 % cream Apply topically 3 times daily 30 g 0     blood glucose (ACCU-CHEK FASTCLIX) lancing device        blood glucose monitoring (NO BRAND SPECIFIED) test strip         No Known Allergies    No past medical history on file.  Past Surgical History:   Procedure Laterality Date     APPENDECTOMY       LAPAROSCOPY DIAGNOSTIC (GYN) N/A 5/10/2017    Procedure: LAPAROSCOPY DIAGNOSTIC (GYN);  DIAGNOSTIC LAPAROSCOPY, WITH LEFT SALPINGO-OOPHORECTOMY,FULGERATION OF ENDOMETRIOSIS, LYSIS OF ADHESIONS;  Surgeon: Misael Vance MD;  Location: [facility]     Family History   Problem Relation Age of Onset     DIABETES Mother      Hypertension Mother      DIABETES Father      Social History     Social History     Marital status:      Spouse name: N/A     Number of children: N/A     Years of education: N/A     Occupational History     Not on file.     Social History Main Topics     Smoking status: Never Smoker     Smokeless tobacco: Never Used     Alcohol use No     Drug use: No     Sexual activity: Not on file     Other Topics Concern     Not on file     Social History Narrative         Review Of Systems  Constitutional, HEENT, cardiovascular, pulmonary, gi and gu systems are negative, except as otherwise noted.      OBJECTIVE:BP 98/64 (BP Location: Right arm, Patient Position: Sitting, Cuff Size: Adult Regular)  Pulse 85  Temp 98.4  F (36.9  C) (Tympanic)  Resp 18  Ht 5' 4\" (1.626 m)  Wt 135 lb (61.2 kg)  SpO2 98%  BMI 23.17 kg/m2      Exam:  Physical Exam   Constitutional: She is oriented to person, place, and time. She appears well-developed and well-nourished. No distress.   Neurological: She is alert and oriented to person, place, and time.   Psychiatric: She has a normal mood and affect.     Other exam not repeated    Labs:  Admission on 05/10/2017, Discharged on 05/10/2017   Component Date Value Ref " Range Status     ABO 05/10/2017 B   Final     RH(D) 05/10/2017  Pos   Final     Antibody Screen 05/10/2017 Neg   Final     Test Valid Only At 05/10/2017 [facility]   Final     Specimen Expires 05/10/2017 05/13/2017   Final     Hemoglobin A1C 05/10/2017 6.7* 4.3 - 6.0 % Final     HCG Qual Urine 05/10/2017 Negative  NEG Final     Estimated Average Glucose 05/10/2017 146  mg/dL Final     Copath Report 05/10/2017    Final                    Value:Patient Name: MESERET ROMAN  MR#: [id number]  Specimen #:   Collected: 5/10/2017  Received: 5/10/2017  Reported: 5/11/2017 11:46  Ordering Phy(s): CHANDANA THOMPSON  Copy To: Jessie Peters  [facility]  [phone]    For improved result formatting, select 'View Enhanced Report Format'  under Linked Documents section.    SPECIMEN(S):  Fallopian tube and ovary, left    FINAL DIAGNOSIS:  Left ovary and fallopian tube, salpingo-oophorectomy  - Paratubal cyst  - Corpora albicans  - Fallopian tube    Electronically signed out by:    Ra Murillo M.D.    CLINICAL HISTORY:  Pelvic pain in female, dyspareunia, female, ovarian cyst; diagnostic  laparoscopy, with left salpingo-oophorectomy, fulguration of  endometriosis, lysis of adhesions.    GROSS:  The specimen is a solid lobulated piece of tan soft tissue and an  attached cylindrical structure which is purple.  The tissue is  consistent with an ovary and fallopian tube.  The ovary is 2.6 x 1.5 x 1  cm.  The fallopian tub                          e is 4.6 x 1 x 0.5 cm with a 1 cm paratubal  unilocular thin-walled cyst.  Sectioning the ovary it has a light to  dark tan appearance. (3 in 2 blocks). (Dictated by: Ra Murillo MD  5/10/2017 04:08 PM)    MICROSCOPIC:  Sections of the ovaries show corpora albicans.  There are some old  luteal cysts.  There is unremarkable fallopian tube with a simple  paratubal cyst.    CPT Codes  A: 97834-ZJ9    TESTING LAB LOCATION:  [facility]  [address] [address]  [phone]    COLLECTION SITE:  Client: [facility]  Location: [facility] (B)       Glucose 05/10/2017 138* 70 - 99 mg/dL Final       ASSESSMENT/PLAN:  Type 2 diabetes mellitus without complication, non insulin requiring (H)  Fasting labs are reviewed.  Goal of hemoglobin A1C of less than 7 discussed.  Instructed in the importance of diet, exercise, and good glycemic control in prevention of secondary complications.    Continue same medication regimen. Repeat fasting glucose and hemoglobin A1C in 6 months.    - Hemoglobin A1c    Adjustment disorder with anxious mood  Discussed treatment options.  Will being intermittent lorazepam (Ativan).  - LORazepam (ATIVAN) 1 MG tablet; Take 1 tablet (1 mg) by mouth every 8 hours as needed for anxiety            Delbert Rosas MD

## 2017-10-28 ASSESSMENT — ANXIETY QUESTIONNAIRES: GAD7 TOTAL SCORE: 6

## 2017-10-30 ENCOUNTER — TELEPHONE (OUTPATIENT)
Dept: FAMILY MEDICINE | Facility: OTHER | Age: 52
End: 2017-10-30

## 2017-10-30 LAB — COPATH REPORT: NORMAL

## 2017-10-30 NOTE — TELEPHONE ENCOUNTER
Reason for call:  RESULTS    1. What is the test that was ordered? A1C  2. Who ordered your test? Dr Rosas  3. When was the test performed?  10/27/17  Description: Pt called to check on results from Friday. Please call her back at [phone]  Was an appointment offered for this a call? No  If Yes :  Appointment type                 Date    Preferred method for responding to this message: Telephone Call  What is your phone number ?    If we cannot reach you directly, may we leave a detailed response at the number you provided? Yes  Can this message wait until your PCP/Provider returns if not available today? Not applicable

## 2017-11-13 DIAGNOSIS — E11.9 TYPE 2 DIABETES MELLITUS WITHOUT COMPLICATION, UNSPECIFIED LONG TERM INSULIN USE STATUS: ICD-10-CM

## 2017-11-14 NOTE — TELEPHONE ENCOUNTER
Aspirin       Last Written Prescription Date: 7/28/2017  Last Fill Quantity: 100,  # refills: 0   Last Office Visit with [facility], [facility] or [facility] prescribing provider: 10/27/2017                                         Next 5 appointments (look out 90 days)     Nov 30, 2017  3:30 PM CST   (Arrive by 3:15 PM)   SHORT with Misael Vance MD   [facility] [place] ([facility] [place] )    [address] [place] [address]   [phone]

## 2017-11-15 RX ORDER — ASPIRIN 81 MG
TABLET, DELAYED RELEASE (ENTERIC COATED) ORAL
Qty: 100 TABLET | Refills: 1 | Status: SHIPPED | OUTPATIENT
Start: 2017-11-15 | End: 2018-06-25

## 2017-11-24 ENCOUNTER — TELEPHONE (OUTPATIENT)
Dept: FAMILY MEDICINE | Facility: OTHER | Age: 52
End: 2017-11-24

## 2017-11-24 DIAGNOSIS — E11.9 TYPE 2 DIABETES MELLITUS WITHOUT COMPLICATION, WITHOUT LONG-TERM CURRENT USE OF INSULIN (H): ICD-10-CM

## 2017-11-24 NOTE — TELEPHONE ENCOUNTER
To: Dr. Alison can  Patient would like a return call today to discuss her medications.  Her phone # [phone].  Thank you

## 2017-11-30 ENCOUNTER — OFFICE VISIT (OUTPATIENT)
Dept: OBGYN | Facility: OTHER | Age: 52
End: 2017-11-30
Attending: OBSTETRICS & GYNECOLOGY
Payer: COMMERCIAL

## 2017-11-30 VITALS
DIASTOLIC BLOOD PRESSURE: 78 MMHG | WEIGHT: 133 LBS | OXYGEN SATURATION: 98 % | HEART RATE: 83 BPM | BODY MASS INDEX: 22.71 KG/M2 | HEIGHT: 64 IN | SYSTOLIC BLOOD PRESSURE: 116 MMHG

## 2017-11-30 DIAGNOSIS — N80.9 ENDOMETRIOSIS: Primary | ICD-10-CM

## 2017-11-30 DIAGNOSIS — N64.4 MASTODYNIA: ICD-10-CM

## 2017-11-30 PROCEDURE — 99213 OFFICE O/P EST LOW 20 MIN: CPT | Performed by: OBSTETRICS & GYNECOLOGY

## 2017-11-30 PROCEDURE — 99212 OFFICE O/P EST SF 10 MIN: CPT

## 2017-11-30 ASSESSMENT — PAIN SCALES - GENERAL: PAINLEVEL: NO PAIN (0)

## 2017-11-30 NOTE — MR AVS SNAPSHOT
"              After Visit Summary   2017    Katherine Royal    MRN: [id number]           Patient Information     Date Of Birth          1965        Visit Information        Provider Department      2017 3:30 PM Misael Vance MD [facility]        Care Instructions    F/u prn          Follow-ups after your visit        Who to contact     If you have questions or need follow up information about today's clinic visit or your schedule please contact [facility] directly at [phone].  Normal or non-critical lab and imaging results will be communicated to you by MyChart, letter or phone within 4 business days after the clinic has received the results. If you do not hear from us within 7 days, please contact the clinic through Blinkbuggyhart or phone. If you have a critical or abnormal lab result, we will notify you by phone as soon as possible.  Submit refill requests through Aligo or call your pharmacy and they will forward the refill request to us. Please allow 3 business days for your refill to be completed.          Additional Information About Your Visit        MyChart Information     Aligo lets you send messages to your doctor, view your test results, renew your prescriptions, schedule appointments and more. To sign up, go to [email or web address] . Click on \"Log in\" on the left side of the screen, which will take you to the Welcome page. Then click on \"Sign up Now\" on the right side of the page.     You will be asked to enter the access code listed below, as well as some personal information. Please follow the directions to create your username and password.     Your access code is: [email or web address]  Expires: 3/1/2018 11:56 AM     Your access code will  in 90 days. If you need help or a new code, please call your [facility] or [phone].        Care EveryWhere ID     This is your Care EveryWhere ID. This could be used by other organizations to access your " "[facility] medical records  [id number]        Your Vitals Were     Pulse Height Pulse Oximetry BMI (Body Mass Index)          83 5' 4\" (1.626 m) 98% 22.83 kg/m2         Blood Pressure from Last 3 Encounters:   11/30/17 116/78   10/27/17 98/64   08/29/17 112/62    Weight from Last 3 Encounters:   11/30/17 133 lb (60.3 kg)   10/27/17 135 lb (61.2 kg)   08/29/17 138 lb (62.6 kg)              Today, you had the following     No orders found for display       Primary Care Provider Office Phone # Fax #    Delbert Rosas [phone]       [facility] [address] [facility] [address]        Equal Access to Services     SRUTHI FISHER : Hadii aad ku hadasho Soomaali, waaxda luqadaha, qaybta kaalmada adeegyada, calvin shrestha . So [facility] [phone].    ATENCIÓN: Si habla español, tiene a henson disposición servicios gratuitos de asistencia lingüística. Llame al [phone].    We comply with applicable federal civil rights laws and [place] laws. We do not discriminate on the basis of race, color, national origin, age, disability, sex, sexual orientation, or gender identity.            Thank you!     Thank you for choosing [facility]  for your care. Our goal is always to provide you with excellent care. Hearing back from our patients is one way we can continue to improve our services. Please take a few minutes to complete the written survey that you may receive in the mail after your visit with us. Thank you!             Your Updated Medication List - Protect others around you: Learn how to safely use, store and throw away your medicines at www.disposemymeds.org.          This list is accurate as of: 11/30/17 11:59 PM.  Always use your most recent med list.                   Brand Name Dispense Instructions for use Diagnosis    ASPIRIN LOW DOSE 81 MG EC tablet   Generic drug:  aspirin     100 tablet    TAKE ONE TABLET BY MOUTH ONCE DAILY    Type 2 diabetes mellitus " without complication, unspecified long term insulin use status (H)       atorvastatin 10 MG tablet    LIPITOR    90 tablet    Take 1 tablet (10 mg) by mouth daily        * blood glucose lancing device           * blood glucose lancets standard    no brand specified    100 each    Use to test blood sugar 2 times daily or as directed.    Type 2 diabetes mellitus without complication, without long-term current use of insulin (H)       blood glucose monitoring test strip    no brand specified    100 strip    Check blood glucose twice daily    Type 2 diabetes mellitus without complication, without long-term current use of insulin (H)       cloNIDine 0.1 MG tablet    CATAPRES    90 tablet    TAKE ONE TABLET BY MOUTH AT BEDTIME FOR  ELEVATED  BLOOD  PRESSURE.    Benign essential hypertension       hydrochlorothiazide 12.5 MG Tabs tablet     90 tablet    Take 1 tablet (12.5 mg) by mouth daily        leuprolide 11.25 MG kit    LUPRON DEPOT (3-MONTH)    1 each    Inject 11.25 mg into the muscle every 3 months    Endometriosis       losartan 50 MG tablet    COZAAR    135 tablet    Take 1.5 tablets (75 mg) by mouth daily        metFORMIN 500 MG tablet    GLUCOPHAGE    180 tablet    Take 2 tablets (1,000 mg) by mouth 2 times daily (with meals)    Type 2 diabetes mellitus without complication, without long-term current use of insulin (H)       Multi-vitamin Tabs tablet      Take 1 tablet by mouth daily        mupirocin 2 % cream    BACTROBAN    30 g    Apply topically 3 times daily    Postoperative state       * Notice:  This list has 2 medication(s) that are the same as other medications prescribed for you. Read the directions carefully, and ask your doctor or other care provider to review them with you.

## 2017-11-30 NOTE — NURSING NOTE
"Chief Complaint   Patient presents with     RECHECK     follow up for endometriosis/ bilateral breast pain       Initial /78 (BP Location: Right arm, Cuff Size: Adult Regular)  Pulse 83  Ht 5' 4\" (1.626 m)  Wt 133 lb (60.3 kg)  SpO2 98%  BMI 22.83 kg/m2 Estimated body mass index is 22.83 kg/(m^2) as calculated from the following:    Height as of this encounter: 5' 4\" (1.626 m).    Weight as of this encounter: 133 lb (60.3 kg).  Medication Reconciliation: complete     Latoya Borjas      "

## 2017-12-01 NOTE — PROGRESS NOTES
S:  F/u endometriosis    See my prior evals.  Pt 6 months s/p Laparascopic LSO and fulguration of endometriosis for pelvic pain/endometriosis.  She was then treated with 6 month cource of Depo-Lupron.  She has some hot flashes but otherwise asymptomatic.  Denies pelvic pain of bleeding.  Her only other complaint is shooting breast pain Bilateral since breast bx 1 month ago (benign).         Patient Active Problem List   Diagnosis     Type 2 diabetes mellitus without complication, non insulin requiring (H)     HTN (hypertension)     Perimenopausal     Calculus of kidney     Prediabetes     Primary insomnia          No past medical history on file.         Past Surgical History:   Procedure Laterality Date     APPENDECTOMY       LAPAROSCOPY DIAGNOSTIC (GYN) N/A 5/10/2017    Procedure: LAPAROSCOPY DIAGNOSTIC (GYN);  DIAGNOSTIC LAPAROSCOPY, WITH LEFT SALPINGO-OOPHORECTOMY,FULGERATION OF ENDOMETRIOSIS, LYSIS OF ADHESIONS;  Surgeon: Misael Vance MD;  Location: [facility]            Social History   Substance Use Topics     Smoking status: Never Smoker     Smokeless tobacco: Never Used     Alcohol use No            Family History   Problem Relation Age of Onset     DIABETES Mother      Hypertension Mother      DIABETES Father              No Known Allergies         Current Outpatient Prescriptions   Medication Sig Dispense Refill     metFORMIN (GLUCOPHAGE) 500 MG tablet Take 2 tablets (1,000 mg) by mouth 2 times daily (with meals) 180 tablet 3     ASPIRIN LOW DOSE 81 MG EC tablet TAKE ONE TABLET BY MOUTH ONCE DAILY 100 tablet 1     multivitamin, therapeutic with minerals (MULTI-VITAMIN) TABS tablet Take 1 tablet by mouth daily       losartan (COZAAR) 50 MG tablet Take 1.5 tablets (75 mg) by mouth daily 135 tablet 3     atorvastatin (LIPITOR) 10 MG tablet Take 1 tablet (10 mg) by mouth daily 90 tablet 3     hydrochlorothiazide 12.5 MG TABS tablet Take 1 tablet (12.5 mg) by mouth daily 90 tablet 3     cloNIDine (CATAPRES) 0.1  "MG tablet TAKE ONE TABLET BY MOUTH AT BEDTIME FOR  ELEVATED  BLOOD  PRESSURE. 90 tablet 5     mupirocin (BACTROBAN) 2 % cream Apply topically 3 times daily 30 g 0     blood glucose monitoring (NO BRAND SPECIFIED) test strip Check blood glucose twice daily (Patient not taking: Reported on 11/30/2017) 100 strip 3     blood glucose (NO BRAND SPECIFIED) lancets standard Use to test blood sugar 2 times daily or as directed. (Patient not taking: Reported on 11/30/2017) 100 each 11     leuprolide (LUPRON DEPOT, 3-MONTH,) 11.25 MG kit Inject 11.25 mg into the muscle every 3 months (Patient not taking: Reported on 11/30/2017) 1 each 0     blood glucose (ACCU-CHEK FASTCLIX) lancing device             Review Of Systems  Constitutional:  Denies fever  GI/ negative except as noted per hpi    O:   /78 (BP Location: Right arm, Cuff Size: Adult Regular)  Pulse 83  Ht 5' 4\" (1.626 m)  Wt 133 lb (60.3 kg)  SpO2 98%  BMI 22.83 kg/m2  Gen:  NAD, A and O  Breasts:  No lymphadenopathy/masses.  Biopsy sites well healed.          A/P:  Endometriosis.  In remission.  Expectant management.  F/u prn if recurrent pain or problems.  Mastalgia.  Discussed NSAID, Vit E, Primrose oil. Caffeine avoidance.  F/u with PCM if no improvement.   15 minutes were spent with the patient with greater than 50% of the visit spent in face-to-face counseling and coordination of care.        "

## 2017-12-27 NOTE — PROGRESS NOTES
Patient Information     Patient Name MRN Katherine Veliz [id number] Female 1965      Progress Notes by Didier Sweeney MD at 2017 10:45 AM     Author:  Didier Sweeney MD Service:  (none) Author Type:  Physician     Filed:  2017  1:42 PM Encounter Date:  2017 Status:  Signed     :  Didier Sweeney MD (Physician)            I was asked to see this patient by Dr Rosas and provide my opinion about the following:   Kidney stone    Type of Visit  Consult    Chief Complaint   Kidney stone    HPI  Ms. Royal is a 52 y.o. female who presents with left kidney stone.  The patient initially presented to clinic with intermittent left flank pain  At that time the patient underwent a CT scan which revealed a 7 mm nonobstructing stone.  The patient currently denies fevers or chills.  The patient currently denies nausea or vomiting.  She has not undergone surgery in the past for stones.    Outside hospital records reviewed in detail today including the imaging.      Past Medical History  She  has a past medical history of Diabetes type 2, controlled (HC); Hypertension (3/9/2016); and Renal lithiasis (2015).  Patient Active Problem List     Diagnosis  Code     Hypertension I10     Prediabetes R73.03     Renal lithiasis N20.0     Medications  She has a current medication list which includes the following prescription(s): accu-chek fastclix, accu-chek smartview test strip, aspirin, atorvastatin, clonidine hcl, gemfibrozil, hydrochlorothiazide, hydrocodone-acetaminophen (5-325 mg), ibuprofen, leuprolide acetate, losartan, metformin, mupirocin, and paroxetine.    Allergies  No Known Allergies    Social History  She  reports that she has never smoked. She has never used smokeless tobacco.  No drug abuse.    Family History  No family history of urologic cancers    Review of Systems  I personally reviewed the ROS with the patient.    Nursing Notes:   Che Norton  2017 11:44 AM   "Signed  Patient presents to the clinic for kidney stone consult.  Che Norton LPN........................9/6/2017  11:03 AM    Review of Systems:    Weight loss:    No     Recent fever/chills:  No   Night sweats:   yes  Current skin rash:  No   Recent hair loss:  No  Heat intolerance:  No   Cold intolerance:  No  Chest pain:   No   Palpitations:   No  Shortness of breath:  No   Wheezing:   No  Constipation:    No   Diarrhea:   No   Nausea:   No   Vomiting:   No   Kidney/side pain:  yes   Back pain:   yes  Frequent headaches:  No   Dizziness:     yes  Leg swelling:   No   Calf pain:    yes    Parents, brothers or sisters with history of kidney cancer?   No  Parents, brothers or sisters with history of bladder cancer: No      Physical Exam  Vitals:     09/06/17 1106   BP: 122/62   Pulse: 68   Weight: 62.1 kg (137 lb)   Height: 1.626 m (5' 4\")     Constitutional: NAD, WDWN  Head: NCAT  Eyes: Conjunctivae normal  Cardiovascular: Regular rate  Pulmonary/Chest: Respirations are even and non-labored bilaterally  Abdominal: Soft with no distension, tenderness, masses, guarding.    - left CVA tenderness    - right CVA tenderness  Neurological: A + O x 3,  cranial nerves II-XII grossly intact  Extremities: NOLA x 4, warm, no clubbing, no cyanosis  Skin: Pink, warm, dry with no rash  Psychiatric:  Normal mood and affect  Genitourinary: Nonpalpable bladder    Imaging  CT stone study  3/22/2017  I personally reviewed and interpreted the images and report.  7 mm left kidney stone    Assessment  Ms. Royal is a 52 y.o. female who presents with left kidney stone and intermittent left flank pain.  Given the stone size we discussed intervention as it is unlikely she would successfully pass the left stone.    Discussed the treatment options for the Left stone including observation, ESWL and ureteroscopy with laser lithotripsy.      After explaining the risks, benefits, and alternatives a decision was made to proceed with " ureteroscopy/laser lithotripsy.    The patient was explained the specific risks of bleeding, pain, infection, and ureteral injury.    In addition, the patient was told a stent may need to be placed which could result in urinary frequency, urgency, dysuria, and flank pain with voiding.    It would require an office based procedure to remove it at a later date.    Finally, the patient was told if the stone was very impacted, that we would place a stent and return at a later date to treat the stone.      Plan  The patient was scheduled and consented for Left ureteroscopy with laser lithotripsy, ureteral stent placement in the OR.   - Pre-op through PCP for perioperative management of hypertension and diabetes.

## 2017-12-27 NOTE — PROGRESS NOTES
Patient Information     Patient Name MRN Sex Katherine Gaviria [id number] Female 1965      Progress Notes by Didier Sweeney MD at 2017  8:00 AM     Author:  Didier Sweeney MD Service:  (none) Author Type:  Physician     Filed:  2017  9:54 AM Encounter Date:  2017 Status:  Signed     :  Didier Sweeney MD (Physician)            Preoperative diagnosis  Nephrolithiasis    Postoperative diagnosis  Nephrolithiasis    Procedure  Flexible cystourethroscopy with stent removal    Surgeon  Didier Sweeney MD    Anesthesia  2% lidocaine jelly intraurethrally    Complications  None    Indications  52 y.o. female who is status post ureteroscopy with holmium laser lithotripsy who presents for stent removal.    Procedure  The patient was given one dose of antibiotics. The patient was placed in supine position and was prepped and draped in sterile fashion.  2% lidocaine jelly was bluntly injected per urethra without difficulty. I passed the 14 French flexible cystoscope through the urethra and into the bladder.  With the aid of a stent grasper I grasped and removed the stent in its entirety.  The patient tolerated the procedure well.    Plan  Discussed generic dietary approach to stone prevention- provided hand out  F/u as needed

## 2017-12-28 NOTE — OR POSTOP
Patient Information     Patient Name MRN Sex Katherine Gaviria [id number] Female 1965      OR PostOp by Trey Melton RN at 2017  1:33 PM     Author:  Trey Melton RN Service:  (none) Author Type:  NURS- Registered Nurse     Filed:  2017  1:35 PM Date of Service:  2017  1:33 PM Status:  Signed     :  Trey Melton RN (NURS- Registered Nurse)            PACU Transfer Note    Katherine Royal transferred to DSU via cart.  Equipment used for transport:  None.  Accompanied by:  Registered Nurse report to MIRIAN Morgan RN    Patient stable and meets phase 1 discharge criteria for transport from PACU.    PACU Respiratory Event Documentation     1) Episodes of Apnea greater than or equal to 10 seconds: 0    2) Bradypnea - less than 8 breaths per minute: 0    3) Pain score on 0 to 10 scale: 3    4) Pain-sedation mismatch (yes or no): no    5) Repeated 02 desaturation less than 90% (yes or no): no    Anesthesia notified? (yes or no): na    Any of the above events occuring repeatedly in separate 30 minute intervals may be considered recurrent PACU respiratory events.

## 2017-12-28 NOTE — OR ANESTHESIA
Patient Information     Patient Name MRN Sex     Katherine Royal [id number] Female 1965      OR Anesthesia by Austin Garzon DO at 2017  1:21 PM     Author:  Austin Garzon DO Service:  (none) Author Type:  PHYS- Anesthesiologist     Filed:  2017  1:21 PM Date of Service:  2017  1:21 PM Status:  Signed     :  Austin Garzon DO (PHYS- Anesthesiologist)            Anesthesia Post Operative Care Note    Name: Katherine Royal  MRN:   [id number]  :    1965       Procedure Done:  See Surgeon Note        Anesthesia Technique    Anesthetic Type:  General     Airway Management:  ET Tube     Oral Trauma:  No    Intraoperative Course   Hemodynamics:  Stable    Ventilation Normal:  Yes Lung Sounds:  Normal      PACU Course    Airway Status:  Extubated     Nondepolarizer Used:       Reversed: N/A   Hemodynamics:  Stable      Hydration: Euvolemic   Temperature:  36.1 - 38.3      Mental Status:  Awake, alert, follows commands   Pain Management:  Adequate   Regional Block:  No   Anesthesia Complications:  None      Vital Signs:  Temp: 97.4  F (36.3  C)  Pulse: 65  BP: 117/82  Resp: 16  SpO2: 97 %    O2 Device: Room Air    NON-VERBAL PAIN SCALE  Face: No particular expression or smile  Activity (Movement): Lying quietly, normal position  Guarding: Lying quietly  Physiologic I (Vital Signs): Stable vital signs/no change 4 hrs  Physiologic II: Warm, dry, skin  Total Score: 0    Level of Nausea: None        Active Lines:  Patient Lines/Drains/Airways Status    Active Line     Name: Placement date: Placement time: Site: Days:    PERIPHERAL VAD Right Forearm 20 17   0930   Forearm   less than 1                Intake & Output:  Date  17 - 17 0659(Not Admitted)    17 - 17 0659      Shift  4851-8459 1327-9516 7855-2415 24 Hour Total 0814-7844 0365-5670 3627-9950 24 Hour Total   I  N  T  A  K  E   Intravenous     1000   1000       +I/O+  Maint IV (lactated  Ringers infusion)     1000   1000    Shift Total     1000   1000   O  U  T  P  U  T   Shift Total           NET      1000   1000   Weight (kg)      62.6 62.6 62.6 62.6         Labs:  No results for input(s): VH6YCBJIWFO, YQQ0XAMLOFTM, PHARTERIAL, XSN0RJQMMAUX, I2MFSBJAENYM in the last 24 hours.    No results for input(s): MAGNESIUM in the last 24 hours.    No results for input(s): GLUCOSEMETER in the last 720 hours.        Austin Garzon DO ....................  9/12/2017   1:21 PM

## 2017-12-28 NOTE — PATIENT INSTRUCTIONS
Patient Information     Patient Name MRN Katherine Veliz [id number] Female 1965      Patient Instructions by Anne Maynrad RN at 2017  8:00 AM     Author:  Anne Maynard RN  Service:  (none) Author Type:  NURS- Registered Nurse     Filed:  2017  9:40 AM  Encounter Date:  2017 Status:  Addendum     :  Anne Maynard RN (NURS- Registered Nurse)        Related Notes: Original Note by Anne Maynard RN (NURS- Registered Nurse) filed at 2017  9:39 AM            Home Care after Cystoscopy with Stent Removal  Follow these guidelines for your care after your procedure.    Activity  No limitations    Bathing or showering  No limitations    Symptoms  You may notice some burning with urination but this usually resolves after 1-2 days.  You may also notice small amounts of blood in your urine.  Please increase water intake for the next few days to help with these symptoms.    Contacts  General Questions: [phone]  Appointments:  [phone]  Emergencies:  [phone]    When to call the clinic  If you develop any of the following symptoms please call the clinic immediately.  If the clinic is closed please be seen at an urgent care clinic or the Emergency Department.  - Burning with urination that worsens after 2 days  - Unable to urinate causing severe pelvic pain  - Fevers of greater than 101 degrees F  - Flank pain that is not responding to pain medication    Follow up  Please follow up as discussed at the appointment.      Please follow the below generic recommendations to help prevent stones.    If you are interested in undergoing a work up (including blood and urine tests) to determine your patient specific factors for why you form stones and ways to specifically prevent stones in the future, ask Dr Sweeney if he hasn't already discussed this with you.      Fluids (Increase)  Please increase your fluid intake   Recommend about ten 10-ounce glasses of fluid per day  (avoid dark jef).  Please try and keep your urine clear or pale yellow.    If it is dark yellow or cloudy it is concentrated and you need to drink more fluid.  Try drinking a tall glass of water prior to every snack/meal.    Citrate (Increase)  Citrate prevents stone formation and is naturally found in urine.    It can be increased by adding concentrated lemon juice (4 ounces daily) and/or drinking diluted orange juice (50/50 with water).    Both MinuteMaid Light and Crystal Light also contain citrate and can be helpful for stone prevention.    If you plan to drink sodas, I would recommend Diet/Sunkist and/or Diet/7UP as they contain the most citrate (which is good) compared to the jef such as Coke/Pepsi.      Salt (Decrease)  Try to limit salt intake.  Total daily sodium intake should be less than 1500mg.  If you use salt with cooking don't add any additional salt at the table.    Protein  Limit protein intake to small to moderate portions.  For instance, a steak should be no larger than about the size of a deck of cards.  High protein intake leads to stone formation.      STONE ANALYSIS   Order: [id number]   Status:  Final result   Visible to patient:  No (Not Released)      8d ago     STONE SOURCE              Left Kidney/URETERAL   1ST CONSTITUENT           SEE COMMENTS   Comments: RESULT: 40% Calcium phosphate (apatite)   2ND CONSTITUENT           SEE COMMENTS   Comments: RESULT: 40% Calcium oxalate monohydrate   3RD CONSTITUENT           SEE COMMENTS   Comments: RESULT: 20% Calcium oxalate dihydrate

## 2017-12-28 NOTE — OR POSTOP
Patient Information     Patient Name MRN Sex Katherine Gaviria [id number] Female 1965      OR PostOp by Brigitte Morgan RN at 2017  3:02 PM     Author:  Brigitte Morgan RN Service:  (none) Author Type:  NURS- Registered Nurse     Filed:  2017  3:03 PM Date of Service:  2017  3:02 PM Status:  Signed     :  Brigitte Morgan RN (NURS- Registered Nurse)            Discharge Note    Data:  Katherine Royal has been discharged home at 1455 via wheelchair accompanied by Registered Nurse.      Action:  Written discharge/follow-up instructions were provided to patient and Spouse. Prescriptions were written and sent with patient.  Belongings sent with patient. Medications from home sent with patient/family: Not Applicable  Equipment none .     Response:  Patient verbalized understanding of discharge instructions, reason for discharge, and necessary follow-up appointments.

## 2017-12-28 NOTE — INTERVAL H&P NOTE
Patient Information     Patient Name MRN Katherine Veliz [id number] Female 1965      Interval H&P Note by Didier Denny MD at 2017  9:39 AM     Author:  Didier Denny MD Service:  (none) Author Type:  Physician     Filed:  2017  9:39 AM Date of Service:  2017  9:39 AM Status:  Signed     :  Didier Denny MD (Physician)            History and Physical Update    The history and physical has been reviewed and the patient has been examined.  There are no interim changes to the patient's history or physical condition.    DIDIER DENNY MD          Source Note     Author:  Scanner Service:  (none) Author Type:  (none)    Filed:  2017 12:16 PM Date of Service:  2017 12:16 PM Status:  Signed    :  Scanner           Scan on 2017 12:16 PM by Ramona Ruiz

## 2017-12-28 NOTE — OR ANESTHESIA
"Patient Information     Patient Name MRN Sex Katherine Gaviria [id number] Female 1965      OR Anesthesia by Austin Garzon DO at 2017 10:24 AM     Author:  Austin Garzon DO Service:  (none) Author Type:  PHYS- Anesthesiologist     Filed:  2017 10:25 AM Date of Service:  2017 10:24 AM Status:  Signed     :  Austin Garzon DO (PHYS- Anesthesiologist)            ANESTHESIAPREOP    PREANESTHETIC EXAM    Katherine Royal is a 52 y.o. female    /71  Pulse 68  Temp 98.6  F (37  C)  Resp 18  Ht 1.626 m (5' 4\")  Wt 62.6 kg (138 lb)  SpO2 97%  BMI 23.69 kg/m2  Body mass index is 23.69 kg/(m^2).    ALLERGIES    Review of patient's allergies indicates no known allergies.    PAST MEDICAL HISTORY    Past Medical History:     Diagnosis  Date     Diabetes type 2, controlled (HC)      Hypertension 3/9/2016     Renal lithiasis 2015    left         Patient Active Problem List     Diagnosis  Code     Hypertension I10     Prediabetes R73.03     Renal lithiasis N20.0     Kidney stone on left side N20.0     Kidney stone on left side N20.0       No family history on file.    Past Surgical History:      Procedure  Laterality Date     LAPAROSCOPIC APPENDECTOMY         Major Anesthetic Reactions: none    PMH/PSH Reviewed    History    Smoking Status      Never Smoker   Smokeless Tobacco      Never Used     History    Alcohol use Not on file       Medications have been reviewed in coordination with proposed intra-procedure medications.    Prescriptions Prior to Admission       Medication  Sig Dispense Refill     ACCU-CHEK FASTCLIX        ACCU-CHEK SMARTVIEW TEST STRIP strip        aspirin (ECOTRIN) 81 mg enteric coated tablet        atorvastatin (LIPITOR) 10 mg tablet        cloNIDine HCl (CATAPRES) 0.1 mg tablet TAKE ONE TABLET BY MOUTH AT BEDTIME FOR  ELEVATED  BLOOD  PRESSURE.       hydroCHLOROthiazide 12.5 mg tablet        ibuprofen (ADVIL; MOTRIN) 600 mg tablet Take 600 mg by mouth.  "      leuprolide acetate (LUPRON DEPOT, 3 MONTH,) 11.25 mg injection Inject 11.25 mg intramuscular.       losartan (COZAAR) 50 mg tablet Take 1 tablet by mouth once daily. 90 tablet 0     metFORMIN (GLUCOPHAGE) 1,000 mg tablet 2 times daily with meals.       mupirocin (BACTROBAN) 2 % cream Apply  topically to affected area(s).       norethindrone (NORLUTATE) 5 mg tablet          Recent Labs  Results for orders placed or performed during the hospital encounter of 09/12/17       Pregnancy Urine        Result  Value Ref Range Status    PREGNANCY,URINE           Negative Negative Final       NPO Status Noted:  Yes    Airway Class:  2    ASA Physical Status: 2    Anesthetic Plan: GA/ LMA    The risks, benefits, and alternatives of the procedure were discussed.    PHYSICIAN ELECTRONIC SIGNATURE  Eugenio Garzon DO

## 2017-12-28 NOTE — OR PREOP
Patient Information     Patient Name MRN Katherine Veliz [id number] Female 1965      OR PreOp by Brittney Alonzo RN at 2017 11:45 AM     Author:  Brittney Alonzo RN Service:  (none) Author Type:  NURS- Registered Nurse     Filed:  2017 11:47 AM Date of Service:  2017 11:45 AM Status:  Signed     :  Brittney Alonzo RN (NURS- Registered Nurse)            Report to Elizabeth Schmitz. Patient to OR via cart.

## 2017-12-28 NOTE — PROCEDURES
Patient Information     Patient Name MRN Sex Katherine Gaviria [id number] Female 1965      Procedures by Didier Sweeney MD at 2017 12:44 PM     Author:  Didier Sweeney MD Service:  (none) Author Type:  Physician     Filed:  2017 12:45 PM Date of Service:  2017 12:44 PM Status:  Signed     :  Didier Sweeney MD (Physician)            Preoperative diagnosis  Left kidney stone    Postoperative diagnosis  Left kidney stone    Procedure performed  Left ureteroscopy with holmium-YAG laser lithotripsy and basket extraction of stone fragments  Cystoscopy with left retrograde pyelogram and ureteral stent placement  Interpretation of retrograde    Surgeon  Didier Sweeney MD    Surgeon(s)/Proceduralist(s) and Assistants (if any)  Surgeon(s):  Didier Sweeney MD  Circulator: Laure Simms RN  Circulator Relief: Diana Norton RN  Laser Tech: Cinthia Taylor  PACU Nurse: Kika Queen RN; Kika Little RN  Pre Op Nurse: Brittney Alonzo RN  Phase II Nurse: Brigitte Morgan RN  Scrub: Sofia Marte CST    Specimen(s)  Stone analysis?  yes    (EBL) Estimated blood loss (ml)  5    Anesthesia  General    Complications  None    Findings  Urethroscopy revealed no strictures or other abnormalities.  Cystoscopy revealed no tumors, stones or other mucosal abnormalities.  Left retrograde pyelogram revealed a delicate system with identification of the stone seen on pre-op imaging.  No other filling defects and caliber of ureter was smooth and normal.    Indications  52 y.o. female agreed to undergo the above named procedure after discussion of the alternatives, risks and benefits.  Informed consent was obtained.      Procedure  The patient was taken to the operating room and placed supine on the operating table.  Pre-operative antibiotics were administered.  Bilateral lower extremity SCDs were placed.  After induction of general anesthesia the patient was positioned in dorsal lithotomy, prepped and  draped in a sterile fashion.  A time-out was performed.      A 14-Norwegian flexible cystoscope was passed carefully via urethra into the bladder.  The left ureteral orifice was identified and a Sensor wire was passed retrograde to the level of the kidney and confirmed by fluoroscopy.  The flexible scope was off-loaded and the bladder emptied with a straight catheter.  A 5-Norwegian open-ended was passed over the wire and the wire removed.  A retrograde pyelogram was performed by slowly injecting 5 mL of 50% Omnipaque contrast via the 5-Norwegian catheter with findings described above.  A sensor wire was replaced and the 5-Norwegian removed. An 8-10 coaxial dilator was passed without difficulty.  The 8-Norwegian portion was removed and an Amplatz super-stiff was placed to the level of the renal pelvis confirmed by fluoroscopy. The 10-Norwegian dilator was then withdrawn. The Sensor wire was clipped to the drape as a safety wire.  A 11-13, 36-cm access sheath was advanced over the super-stiff wire to level of the UPJ under direct fluoroscopic guidance. The inner stylet and super-stiff wire were removed.  The kidney was entered with the Olympus URF-P6 flexible ureteroscope.  The kidney stone was identified and fragmented with a 200-micron holmium YAG laser fiber at laser settings of 0.8 joules and a frequency of 8 Hz. The fragments were basket extracted. At the completion of the procedure, all clinically significant fragments were removed and only dust-like fragments remained.  The access sheath was removed under direct vision.  Ureteral edema but no obvious obstruction was present.  A 5-Norwegian catheter was passed over the safety wire and the wire withdrawn.   Contrast was injected into the renal pelvis via the 5-Norwegian open-ended catheter.  A super-stiff wire was placed and confirmed by fluoroscopy.  A 6 x 24 Norwegian stent was positioned with the upper end in the upper pole and the lower in the bladder confirmed by fluoroscopy. The  bladder was emptied and the procedure was complete. The patient tolerated the procedure well and was stable throughout.    Plan  Follow up in clinic for stent pull in the next week or so.

## 2017-12-30 NOTE — NURSING NOTE
Patient Information     Patient Name MRN Katherine Veliz [id number] Female 1965      Nursing Note by Che Norton at 2017 10:45 AM     Author:  Che Norton Service:  (none) Author Type:  (none)     Filed:  2017 11:44 AM Encounter Date:  2017 Status:  Signed     :  Che Norton            Patient presents to the clinic for kidney stone consult.  Che Norton LPN........................2017  11:03 AM    Review of Systems:    Weight loss:    No     Recent fever/chills:  No   Night sweats:   yes  Current skin rash:  No   Recent hair loss:  No  Heat intolerance:  No   Cold intolerance:  No  Chest pain:   No   Palpitations:   No  Shortness of breath:  No   Wheezing:   No  Constipation:    No   Diarrhea:   No   Nausea:   No   Vomiting:   No   Kidney/side pain:  yes   Back pain:   yes  Frequent headaches:  No   Dizziness:     yes  Leg swelling:   No   Calf pain:    yes    Parents, brothers or sisters with history of kidney cancer?   No  Parents, brothers or sisters with history of bladder cancer: No

## 2017-12-30 NOTE — NURSING NOTE
Patient Information     Patient Name MRN Katherine Veliz [id number] Female 1965      Nursing Note by Che Norton at 2017  8:00 AM     Author:  Che Norton Service:  (none) Author Type:  (none)     Filed:  2017  9:42 AM Encounter Date:  2017 Status:  Signed     :  Che Norton            Patient presents to the clinic for cysto with left stent pull.    Cipro 500mg  ordered by Didier Sweeney MD.  Medication administered per verbal order   Lot # [id number]  Exp. 10/2018    Patient tolerated well.  Che Norton LPN ..................2017  9:41 AM

## 2017-12-30 NOTE — NURSING NOTE
Patient Information     Patient Name MRN Sex Katherine Gaviria [id number] Female 1965      Nursing Note by Anne Maynard RN at 2017  8:00 AM     Author:  Anne Maynard RN Service:  (none) Author Type:  NURS- Registered Nurse     Filed:  2017  9:53 AM Encounter Date:  2017 Status:  Signed     :  Anne Maynard RN (NURS- Registered Nurse)            Patient positioned in frog leg position prior to Didier Sweeney MD prepping patient with chlorhexidine gluconate cleanser.    [place] Protocol    A. Pre-procedure verification complete yes  1-relevant information / documentation available, reviewed and properly matched to the patient; 2-consent accurate and complete, 3-equipment and supplies available    B. Site marking complete N/A  Site marked if not in continuous attendance with patient    C. TIME OUT completed yes  Time Out was conducted just prior to starting procedure to verify the eight required elements: 1-patient identity, 2-consent accurate and complete, 3-position, 4-correct side/site marked (if applicable), 5-procedure, 6-relevant images / results properly labeled and displayed (if applicable), 7-antibiotics / irrigation fluids (if applicable), 8-safety precautions.    After procedure perineum area rinsed. Discharge instructions reviewed with patient. Patient verbalized understanding of discharge instructions and discharged ambulatory.

## 2018-01-07 ENCOUNTER — HEALTH MAINTENANCE LETTER (OUTPATIENT)
Age: 53
End: 2018-01-07

## 2018-01-25 VITALS
HEART RATE: 68 BPM | WEIGHT: 135.8 LBS | SYSTOLIC BLOOD PRESSURE: 122 MMHG | BODY MASS INDEX: 23.31 KG/M2 | HEIGHT: 64 IN | WEIGHT: 137 LBS | BODY MASS INDEX: 23.39 KG/M2 | DIASTOLIC BLOOD PRESSURE: 68 MMHG | HEART RATE: 88 BPM | SYSTOLIC BLOOD PRESSURE: 104 MMHG | DIASTOLIC BLOOD PRESSURE: 62 MMHG

## 2018-02-08 DIAGNOSIS — Z98.890 POSTOPERATIVE STATE: Primary | ICD-10-CM

## 2018-02-09 RX ORDER — MUPIROCIN 20 MG/G
OINTMENT TOPICAL
Qty: 22 G | Refills: 0 | Status: SHIPPED | OUTPATIENT
Start: 2018-02-09 | End: 2018-03-15

## 2018-02-26 ENCOUNTER — OFFICE VISIT (OUTPATIENT)
Dept: FAMILY MEDICINE | Facility: OTHER | Age: 53
End: 2018-02-26
Attending: FAMILY MEDICINE
Payer: COMMERCIAL

## 2018-02-26 VITALS
DIASTOLIC BLOOD PRESSURE: 62 MMHG | BODY MASS INDEX: 22.71 KG/M2 | OXYGEN SATURATION: 98 % | HEART RATE: 75 BPM | HEIGHT: 64 IN | TEMPERATURE: 97.2 F | SYSTOLIC BLOOD PRESSURE: 110 MMHG | WEIGHT: 133 LBS

## 2018-02-26 DIAGNOSIS — I10 BENIGN ESSENTIAL HYPERTENSION: ICD-10-CM

## 2018-02-26 DIAGNOSIS — E11.9 TYPE 2 DIABETES MELLITUS WITHOUT COMPLICATION, WITHOUT LONG-TERM CURRENT USE OF INSULIN (H): ICD-10-CM

## 2018-02-26 DIAGNOSIS — R10.32 LLQ ABDOMINAL PAIN: ICD-10-CM

## 2018-02-26 DIAGNOSIS — Z23 NEED FOR PROPHYLACTIC VACCINATION AND INOCULATION AGAINST INFLUENZA: Primary | ICD-10-CM

## 2018-02-26 LAB
ALBUMIN SERPL-MCNC: 4.2 G/DL (ref 3.4–5)
ALBUMIN UR-MCNC: NEGATIVE MG/DL
ALP SERPL-CCNC: 81 U/L (ref 40–150)
ALT SERPL W P-5'-P-CCNC: 50 U/L (ref 0–50)
ANION GAP SERPL CALCULATED.3IONS-SCNC: 6 MMOL/L (ref 3–14)
APPEARANCE UR: CLEAR
AST SERPL W P-5'-P-CCNC: 26 U/L (ref 0–45)
BASOPHILS # BLD AUTO: 0.1 10E9/L (ref 0–0.2)
BASOPHILS NFR BLD AUTO: 1 %
BILIRUB SERPL-MCNC: 1 MG/DL (ref 0.2–1.3)
BILIRUB UR QL STRIP: NEGATIVE
BUN SERPL-MCNC: 17 MG/DL (ref 7–30)
CALCIUM SERPL-MCNC: 10 MG/DL (ref 8.5–10.1)
CHLORIDE SERPL-SCNC: 104 MMOL/L (ref 94–109)
CHOLEST SERPL-MCNC: 209 MG/DL
CO2 SERPL-SCNC: 28 MMOL/L (ref 20–32)
COLOR UR AUTO: NORMAL
CREAT SERPL-MCNC: 0.56 MG/DL (ref 0.52–1.04)
DIFFERENTIAL METHOD BLD: NORMAL
EOSINOPHIL # BLD AUTO: 0.3 10E9/L (ref 0–0.7)
EOSINOPHIL NFR BLD AUTO: 3.1 %
ERYTHROCYTE [DISTWIDTH] IN BLOOD BY AUTOMATED COUNT: 12.7 % (ref 10–15)
GFR SERPL CREATININE-BSD FRML MDRD: >90 ML/MIN/1.7M2
GLUCOSE SERPL-MCNC: 106 MG/DL (ref 70–99)
GLUCOSE UR STRIP-MCNC: NEGATIVE MG/DL
HCT VFR BLD AUTO: 42.3 % (ref 35–47)
HDLC SERPL-MCNC: 75 MG/DL
HGB BLD-MCNC: 14.3 G/DL (ref 11.7–15.7)
HGB UR QL STRIP: NEGATIVE
IMM GRANULOCYTES # BLD: 0 10E9/L (ref 0–0.4)
IMM GRANULOCYTES NFR BLD: 0.3 %
KETONES UR STRIP-MCNC: NEGATIVE MG/DL
LDLC SERPL CALC-MCNC: 95 MG/DL
LEUKOCYTE ESTERASE UR QL STRIP: NEGATIVE
LYMPHOCYTES # BLD AUTO: 2.4 10E9/L (ref 0.8–5.3)
LYMPHOCYTES NFR BLD AUTO: 26.6 %
MCH RBC QN AUTO: 29.9 PG (ref 26.5–33)
MCHC RBC AUTO-ENTMCNC: 33.8 G/DL (ref 31.5–36.5)
MCV RBC AUTO: 88 FL (ref 78–100)
MONOCYTES # BLD AUTO: 0.7 10E9/L (ref 0–1.3)
MONOCYTES NFR BLD AUTO: 7.6 %
NEUTROPHILS # BLD AUTO: 5.4 10E9/L (ref 1.6–8.3)
NEUTROPHILS NFR BLD AUTO: 61.4 %
NITRATE UR QL: NEGATIVE
NONHDLC SERPL-MCNC: 134 MG/DL
NRBC # BLD AUTO: 0 10*3/UL
NRBC BLD AUTO-RTO: 0 /100
PH UR STRIP: 5.5 PH (ref 4.7–8)
PLATELET # BLD AUTO: 293 10E9/L (ref 150–450)
POTASSIUM SERPL-SCNC: 4 MMOL/L (ref 3.4–5.3)
PROT SERPL-MCNC: 8 G/DL (ref 6.8–8.8)
RBC # BLD AUTO: 4.79 10E12/L (ref 3.8–5.2)
SODIUM SERPL-SCNC: 138 MMOL/L (ref 133–144)
SOURCE: NORMAL
SP GR UR STRIP: 1.01 (ref 1–1.03)
TRIGL SERPL-MCNC: 195 MG/DL
TSH SERPL DL<=0.005 MIU/L-ACNC: 1.36 MU/L (ref 0.4–4)
UROBILINOGEN UR STRIP-MCNC: NORMAL MG/DL (ref 0–2)
WBC # BLD AUTO: 8.9 10E9/L (ref 4–11)

## 2018-02-26 PROCEDURE — G0463 HOSPITAL OUTPT CLINIC VISIT: HCPCS

## 2018-02-26 PROCEDURE — 90471 IMMUNIZATION ADMIN: CPT | Performed by: FAMILY MEDICINE

## 2018-02-26 PROCEDURE — 85025 COMPLETE CBC W/AUTO DIFF WBC: CPT | Mod: ZL | Performed by: FAMILY MEDICINE

## 2018-02-26 PROCEDURE — 99214 OFFICE O/P EST MOD 30 MIN: CPT | Performed by: FAMILY MEDICINE

## 2018-02-26 PROCEDURE — 80061 LIPID PANEL: CPT | Mod: ZL | Performed by: FAMILY MEDICINE

## 2018-02-26 PROCEDURE — 81003 URINALYSIS AUTO W/O SCOPE: CPT | Mod: ZL | Performed by: FAMILY MEDICINE

## 2018-02-26 PROCEDURE — G0463 HOSPITAL OUTPT CLINIC VISIT: HCPCS | Mod: 25

## 2018-02-26 PROCEDURE — 80053 COMPREHEN METABOLIC PANEL: CPT | Mod: ZL | Performed by: FAMILY MEDICINE

## 2018-02-26 PROCEDURE — 36415 COLL VENOUS BLD VENIPUNCTURE: CPT | Mod: ZL | Performed by: FAMILY MEDICINE

## 2018-02-26 PROCEDURE — 90686 IIV4 VACC NO PRSV 0.5 ML IM: CPT | Performed by: FAMILY MEDICINE

## 2018-02-26 PROCEDURE — 84443 ASSAY THYROID STIM HORMONE: CPT | Mod: ZL | Performed by: FAMILY MEDICINE

## 2018-02-26 ASSESSMENT — PAIN SCALES - GENERAL: PAINLEVEL: MODERATE PAIN (5)

## 2018-02-26 ASSESSMENT — ANXIETY QUESTIONNAIRES
5. BEING SO RESTLESS THAT IT IS HARD TO SIT STILL: NOT AT ALL
1. FEELING NERVOUS, ANXIOUS, OR ON EDGE: SEVERAL DAYS
3. WORRYING TOO MUCH ABOUT DIFFERENT THINGS: NOT AT ALL
6. BECOMING EASILY ANNOYED OR IRRITABLE: NOT AT ALL
GAD7 TOTAL SCORE: 2
2. NOT BEING ABLE TO STOP OR CONTROL WORRYING: NOT AT ALL
IF YOU CHECKED OFF ANY PROBLEMS ON THIS QUESTIONNAIRE, HOW DIFFICULT HAVE THESE PROBLEMS MADE IT FOR YOU TO DO YOUR WORK, TAKE CARE OF THINGS AT HOME, OR GET ALONG WITH OTHER PEOPLE: NOT DIFFICULT AT ALL
7. FEELING AFRAID AS IF SOMETHING AWFUL MIGHT HAPPEN: NOT AT ALL

## 2018-02-26 ASSESSMENT — PATIENT HEALTH QUESTIONNAIRE - PHQ9: 5. POOR APPETITE OR OVEREATING: SEVERAL DAYS

## 2018-02-26 NOTE — LETTER
Dear Katherine,  Here is a copy of your most recent lab results for your review. Please call [phone] with any questions.    Results for orders placed or performed in visit on 02/26/18   CBC with platelets differential   Result Value Ref Range    WBC 8.9 4.0 - 11.0 10e9/L    RBC Count 4.79 3.8 - 5.2 10e12/L    Hemoglobin 14.3 11.7 - 15.7 g/dL    Hematocrit 42.3 35.0 - 47.0 %    MCV 88 78 - 100 fl    MCH 29.9 26.5 - 33.0 pg    MCHC 33.8 31.5 - 36.5 g/dL    RDW 12.7 10.0 - 15.0 %    Platelet Count 293 150 - 450 10e9/L    Diff Method Automated Method     % Neutrophils 61.4 %    % Lymphocytes 26.6 %    % Monocytes 7.6 %    % Eosinophils 3.1 %    % Basophils 1.0 %    % Immature Granulocytes 0.3 %    Nucleated RBCs 0 0 /100    Absolute Neutrophil 5.4 1.6 - 8.3 10e9/L    Absolute Lymphocytes 2.4 0.8 - 5.3 10e9/L    Absolute Monocytes 0.7 0.0 - 1.3 10e9/L    Absolute Eosinophils 0.3 0.0 - 0.7 10e9/L    Absolute Basophils 0.1 0.0 - 0.2 10e9/L    Abs Immature Granulocytes 0.0 0 - 0.4 10e9/L    Absolute Nucleated RBC 0.0    Lipid Profile   Result Value Ref Range    Cholesterol 209 (H) <200 mg/dL    Triglycerides 195 (H) <150 mg/dL    HDL Cholesterol 75 >49 mg/dL    LDL Cholesterol Calculated 95 <100 mg/dL    Non HDL Cholesterol 134 (H) <130 mg/dL   Comprehensive metabolic panel   Result Value Ref Range    Sodium 138 133 - 144 mmol/L    Potassium 4.0 3.4 - 5.3 mmol/L    Chloride 104 94 - 109 mmol/L    Carbon Dioxide 28 20 - 32 mmol/L    Anion Gap 6 3 - 14 mmol/L    Glucose 106 (H) 70 - 99 mg/dL    Urea Nitrogen 17 7 - 30 mg/dL    Creatinine 0.56 0.52 - 1.04 mg/dL    GFR Estimate >90 >60 mL/min/1.7m2    GFR Estimate If Black >90 >60 mL/min/1.7m2    Calcium 10.0 8.5 - 10.1 mg/dL    Bilirubin Total 1.0 0.2 - 1.3 mg/dL    Albumin 4.2 3.4 - 5.0 g/dL    Protein Total 8.0 6.8 - 8.8 g/dL    Alkaline Phosphatase 81 40 - 150 U/L    ALT 50 0 - 50 U/L    AST 26 0 - 45 U/L   TSH with free T4 reflex   Result Value Ref Range    TSH 1.36  0.40 - 4.00 mU/L   UA reflex to Microscopic and Culture   Result Value Ref Range    Color Urine Light Yellow     Appearance Urine Clear     Glucose Urine Negative NEG^Negative mg/dL    Bilirubin Urine Negative NEG^Negative    Ketones Urine Negative NEG^Negative mg/dL    Specific Gravity Urine 1.010 1.003 - 1.035    Blood Urine Negative NEG^Negative    pH Urine 5.5 4.7 - 8.0 pH    Protein Albumin Urine Negative NEG^Negative mg/dL    Urobilinogen mg/dL Normal 0.0 - 2.0 mg/dL    Nitrite Urine Negative NEG^Negative    Leukocyte Esterase Urine Negative NEG^Negative    Source Midstream Urine      Dr. Delbert Rosas

## 2018-02-26 NOTE — MR AVS SNAPSHOT
After Visit Summary   2/26/2018    Katherine Royal    MRN: [id number]           Patient Information     Date Of Birth          1965        Visit Information        Provider Department      2/26/2018 8:20 AM Delbert Rosas MD [facility]        Today's Diagnoses     Need for prophylactic vaccination and inoculation against influenza    -  1    LLQ abdominal pain        Type 2 diabetes mellitus without complication, non insulin requiring (H)        HTN (hypertension)          Care Instructions    Radiology will call to schedule CT abdomen and pelvis.          Follow-ups after your visit        Follow-up notes from your care team     Return in about 3 months (around 5/26/2018) for Follow Up Visit.      Your next 10 appointments already scheduled     Mar 09, 2018  4:00 PM CST   (Arrive by 3:00 PM)   CT ABDOMEN PELVIS W CONTRAST with HICT1   HI CT SCAN ([facility] )    [address] [place] [id number] [phone]           Please bring any scans or X-rays taken at other hospitals, if similar tests were done. Also bring a list of your medicines, including vitamins, minerals and over-the-counter drugs. It is safest to leave personal items at home.  Be sure to tell your doctor:   If you have any allergies.   If there s any chance you are pregnant.   If you are breastfeeding.  You may have contrast for this exam. To prepare:   Do not eat or drink for 2 hours before your exam. If you need to take medicine, you may take it with small sips of water. (We may ask you to take liquid medicine as well.)   The day before your exam, drink extra fluids at least six 8-ounce glasses (unless your doctor tells you to restrict your fluids).   You will be given instructions on how to drink the contrast.  Patients over 70 or patients with diabetes or kidney problems:   If you haven t had a blood test (creatinine test) within the last 30 days, the Cardiologist/Radiologist may require  "you to get this test prior to your exam.  If you have diabetes:   Continue to take your metformin medication on the day of your exam  Please wear loose clothing, such as a sweat suit or jogging clothes. Avoid snaps, zippers and other metal. We may ask you to undress and put on a hospital gown.  If you have any questions, please call the Imaging Department where you will have your exam.              Who to contact     If you have questions or need follow up information about today's clinic visit or your schedule please contact [facility] directly at [phone].  Normal or non-critical lab and imaging results will be communicated to you by Tianshenghart, letter or phone within 4 business days after the clinic has received the results. If you do not hear from us within 7 days, please contact the clinic through Elo7 or phone. If you have a critical or abnormal lab result, we will notify you by phone as soon as possible.  Submit refill requests through Elo7 or call your pharmacy and they will forward the refill request to us. Please allow 3 business days for your refill to be completed.          Additional Information About Your Visit        MyChart Information     Elo7 lets you send messages to your doctor, view your test results, renew your prescriptions, schedule appointments and more. To sign up, go to [email or web address] . Click on \"Log in\" on the left side of the screen, which will take you to the Welcome page. Then click on \"Sign up Now\" on the right side of the page.     You will be asked to enter the access code listed below, as well as some personal information. Please follow the directions to create your username and password.     Your access code is: [email or web address]  Expires: 2018  2:32 PM     Your access code will  in 90 days. If you need help or a new code, please call your [facility] or [phone].        Care EveryWhere ID     This is your Care EveryWhere ID. This " "could be used by other organizations to access your [place] medical records  [id number]        Your Vitals Were     Pulse Temperature Height Pulse Oximetry BMI (Body Mass Index)       75 97.2  F (36.2  C) (Tympanic) 5' 4\" (1.626 m) 98% 22.83 kg/m2        Blood Pressure from Last 3 Encounters:   02/26/18 110/62   11/30/17 116/78   10/27/17 98/64    Weight from Last 3 Encounters:   02/26/18 133 lb (60.3 kg)   11/30/17 133 lb (60.3 kg)   10/27/17 135 lb (61.2 kg)              We Performed the Following     CBC with platelets differential     Comprehensive metabolic panel     HC FLU VAC PRESRV FREE QUAD SPLIT VIR 3+YRS IM     Lipid Profile     TSH with free T4 reflex     UA reflex to Microscopic and Culture     Vaccine Administration, Initial [14152]          Today's Medication Changes          These changes are accurate as of 2/26/18 11:59 PM.  If you have any questions, ask your nurse or doctor.               Stop taking these medicines if you haven't already. Please contact your care team if you have questions.     leuprolide 11.25 MG kit   Commonly known as:  LUPRON DEPOT (3-MONTH)   Stopped by:  Delbert Rosas MD                    Primary Care Provider Office Phone # Fax #    Delbert Rosas -[phone] [phone]        Equal Access to Services     [facility] : Dorothy Madera, waaxda jonathon, qaybta leonoralcalvin alejandra. So [facility] [phone].    ATENCIÓN: Si habla español, tiene a henson disposición servicios gratuitos de asistencia lingüística. Gloria al [phone].    We comply with applicable federal civil rights laws and [place] laws. We do not discriminate on the basis of race, color, national origin, age, disability, sex, sexual orientation, or gender identity.            Thank you!     Thank you for choosing [facility]  for your care. Our goal is always to provide you with " excellent care. Hearing back from our patients is one way we can continue to improve our services. Please take a few minutes to complete the written survey that you may receive in the mail after your visit with us. Thank you!             Your Updated Medication List - Protect others around you: Learn how to safely use, store and throw away your medicines at www.disposemymeds.org.          This list is accurate as of 2/26/18 11:59 PM.  Always use your most recent med list.                   Brand Name Dispense Instructions for use Diagnosis    ASPIRIN LOW DOSE 81 MG EC tablet   Generic drug:  aspirin     100 tablet    TAKE ONE TABLET BY MOUTH ONCE DAILY    Type 2 diabetes mellitus without complication, unspecified long term insulin use status (H)       atorvastatin 10 MG tablet    LIPITOR    90 tablet    Take 1 tablet (10 mg) by mouth daily        * blood glucose lancing device           * blood glucose lancets standard    no brand specified    100 each    Use to test blood sugar 2 times daily or as directed.    Type 2 diabetes mellitus without complication, without long-term current use of insulin (H)       blood glucose monitoring test strip    no brand specified    100 strip    Check blood glucose twice daily    Type 2 diabetes mellitus without complication, without long-term current use of insulin (H)       cloNIDine 0.1 MG tablet    CATAPRES    90 tablet    TAKE ONE TABLET BY MOUTH AT BEDTIME FOR  ELEVATED  BLOOD  PRESSURE.    Benign essential hypertension       hydrochlorothiazide 12.5 MG Tabs tablet     90 tablet    Take 1 tablet (12.5 mg) by mouth daily        losartan 50 MG tablet    COZAAR    135 tablet    Take 1.5 tablets (75 mg) by mouth daily        metFORMIN 500 MG tablet    GLUCOPHAGE    180 tablet    Take 2 tablets (1,000 mg) by mouth 2 times daily (with meals)    Type 2 diabetes mellitus without complication, without long-term current use of insulin (H)       Multi-vitamin Tabs tablet      Take 1  tablet by mouth daily        mupirocin 2 % cream    BACTROBAN    30 g    Apply topically 3 times daily    Postoperative state       mupirocin 2 % ointment    BACTROBAN    22 g    APPLY  OINTMENT EXTERNALLY TO AFFECTED AREA THREE TIMES DAILY    Postoperative state       * Notice:  This list has 2 medication(s) that are the same as other medications prescribed for you. Read the directions carefully, and ask your doctor or other care provider to review them with you.

## 2018-02-26 NOTE — PROGRESS NOTES

## 2018-02-26 NOTE — NURSING NOTE
"Chief Complaint   Patient presents with     Pain     Back pain left side. Constant     Diabetes     A1C       Initial /62  Pulse 75  Temp 97.2  F (36.2  C) (Tympanic)  Ht 5' 4\" (1.626 m)  Wt 133 lb (60.3 kg)  SpO2 98%  BMI 22.83 kg/m2 Estimated body mass index is 22.83 kg/(m^2) as calculated from the following:    Height as of this encounter: 5' 4\" (1.626 m).    Weight as of this encounter: 133 lb (60.3 kg).  Medication Reconciliation: complete   MANJINDER CHAND LPN  "

## 2018-02-26 NOTE — PROGRESS NOTES
SUBJECTIVE:  Katherine Royal, 52 year old, female is seen with the following medical problems.    Abdominal pain.  Katherine has developed left flank and left lower quadrant abdominal pain.  Present over the last 2 weeks.  This occurred spontaneously.  She has a history of endometriosis and previously underwent LSO and fulguration of endometriosis.  No associated symptoms.  Katherine also has a history of nephrolithiasis.    Diabetes mellitus, type 2.  In addition, she has a history of diabetes mellitus, type 2 and has been on metformin.  Her blood glucose readings have been stable.  Repeat hemoglogin A1C to be draw.    Hypertension.  Her blood pressures remain stable.  Katherine is on angiotensin 2 receptor blocker, diuretic, as well as clonidine.  No new symptoms.    Immunizations.  Influenza vaccination to be completed.    Denies fever, shaking, chills, dysphagia, change in appetite, weight loss, nausea, vomiting, diarrhea, melena, or hematochezia.  No change in bowel habits.      Current Outpatient Prescriptions   Medication Sig Dispense Refill     mupirocin (BACTROBAN) 2 % ointment APPLY  OINTMENT EXTERNALLY TO AFFECTED AREA THREE TIMES DAILY 22 g 0     metFORMIN (GLUCOPHAGE) 500 MG tablet Take 2 tablets (1,000 mg) by mouth 2 times daily (with meals) 180 tablet 3     blood glucose monitoring (NO BRAND SPECIFIED) test strip Check blood glucose twice daily 100 strip 3     blood glucose (NO BRAND SPECIFIED) lancets standard Use to test blood sugar 2 times daily or as directed. 100 each 11     ASPIRIN LOW DOSE 81 MG EC tablet TAKE ONE TABLET BY MOUTH ONCE DAILY 100 tablet 1     losartan (COZAAR) 50 MG tablet Take 1.5 tablets (75 mg) by mouth daily 135 tablet 3     atorvastatin (LIPITOR) 10 MG tablet Take 1 tablet (10 mg) by mouth daily 90 tablet 3     hydrochlorothiazide 12.5 MG TABS tablet Take 1 tablet (12.5 mg) by mouth daily 90 tablet 3     cloNIDine (CATAPRES) 0.1 MG tablet TAKE ONE TABLET BY MOUTH AT BEDTIME FOR  ELEVATED  " BLOOD  PRESSURE. 90 tablet 5     mupirocin (BACTROBAN) 2 % cream Apply topically 3 times daily 30 g 0     blood glucose (ACCU-CHEK FASTCLIX) lancing device        multivitamin, therapeutic with minerals (MULTI-VITAMIN) TABS tablet Take 1 tablet by mouth daily        No Known Allergies    History reviewed. No pertinent past medical history.  Past Surgical History:   Procedure Laterality Date     APPENDECTOMY       LAPAROSCOPY DIAGNOSTIC (GYN) N/A 5/10/2017    Procedure: LAPAROSCOPY DIAGNOSTIC (GYN);  DIAGNOSTIC LAPAROSCOPY, WITH LEFT SALPINGO-OOPHORECTOMY,FULGERATION OF ENDOMETRIOSIS, LYSIS OF ADHESIONS;  Surgeon: Misael Vance MD;  Location: [facility]     Family History   Problem Relation Age of Onset     DIABETES Mother      Hypertension Mother      DIABETES Father      Social History     Social History     Marital status:      Spouse name: N/A     Number of children: N/A     Years of education: N/A     Occupational History     Not on file.     Social History Main Topics     Smoking status: Never Smoker     Smokeless tobacco: Never Used     Alcohol use No     Drug use: No     Sexual activity: Not on file     Other Topics Concern     Not on file     Social History Narrative         Review Of Systems  Constitutional, HEENT, cardiovascular, pulmonary, gi and gu systems are negative, except as otherwise noted.    OBJECTIVE:  /62  Pulse 75  Temp 97.2  F (36.2  C) (Tympanic)  Ht 5' 4\" (1.626 m)  Wt 133 lb (60.3 kg)  SpO2 98%  BMI 22.83 kg/m2      Exam:  Physical Exam   Constitutional: She is oriented to person, place, and time. She appears well-developed and well-nourished. No distress.   Abdominal: Soft. Normal appearance. She exhibits no distension, no ascites and no mass. There is no hepatosplenomegaly. There is tenderness in the left lower quadrant. There is no CVA tenderness, no tenderness at McBurney's point and negative Shaw's sign. No hernia.   Neurological: She is alert and oriented to person, " place, and time.   Psychiatric: She has a normal mood and affect.     Other exam not repeated    Labs:  Office Visit on 10/27/2017   Component Date Value Ref Range Status     Hemoglobin A1C 10/27/2017 6.4* 4.3 - 6.0 % Final     Estimated Average Glucose 10/27/2017 137  mg/dL Final       ASSESSMENT/PLAN:  LLQ abdominal pain  Etiology undetermined.  CT abdomen and pelvis to rule out nephrolithiasis.  Follow up as arranged.    Type 2 diabetes mellitus without complication, non insulin requiring (H)  Fasting labs are reviewed.  Goal of hemoglobin A1C of less than 7 discussed.  Instructed in the importance of diet, exercise, and good glycemic control in prevention of secondary complications.    Continue same medication regimen. Repeat fasting glucose and hemoglobin A1C in 3 months.   - CBC with platelets differential  - Lipid Profile  - Comprehensive metabolic panel    HTN (hypertension)  Goals reviewed.  Continue home BP monitoring and same medication regimen.  Follow up 6 months.   - TSH with free T4 reflex  - UA reflex to Microscopic and Culture    Need for prophylactic vaccination and inoculation against influenza  Immunization give.  - HC FLU VAC PRESRV FREE QUAD SPLIT VIR 3+YRS IM  - Vaccine Administration, Initial [82374]            Delbert Rosas MD

## 2018-02-27 ENCOUNTER — TELEPHONE (OUTPATIENT)
Dept: FAMILY MEDICINE | Facility: OTHER | Age: 53
End: 2018-02-27

## 2018-02-27 DIAGNOSIS — R10.32 LLQ ABDOMINAL PAIN: Primary | ICD-10-CM

## 2018-02-27 ASSESSMENT — ANXIETY QUESTIONNAIRES: GAD7 TOTAL SCORE: 2

## 2018-02-27 ASSESSMENT — PATIENT HEALTH QUESTIONNAIRE - PHQ9: SUM OF ALL RESPONSES TO PHQ QUESTIONS 1-9: 2

## 2018-02-27 NOTE — TELEPHONE ENCOUNTER
Patient called and wants order for A1c in so she can get in march before insurance is out. Also wants to know if she should have US for pain in her side?

## 2018-02-27 NOTE — TELEPHONE ENCOUNTER
Reason for call:  RESULTS    1. What is the test that was ordered? A1C and kidney  2. Who ordered your test? Dr Rosas  3. When was the test performed?  02/26/18  Description: Pt called looking for test results from yesterday. Please call her back [phone]  Was an appointment offered for this a call? No  If Yes :  Appointment type                 Date    Preferred method for responding to this message: Telephone Call  What is your phone number ?    If we cannot reach you directly, may we leave a detailed response at the number you provided? No  Can this message wait until your PCP/Provider returns if not available today? Not applicable

## 2018-02-28 ENCOUNTER — TELEPHONE (OUTPATIENT)
Dept: FAMILY MEDICINE | Facility: OTHER | Age: 53
End: 2018-02-28

## 2018-02-28 DIAGNOSIS — E11.9 TYPE 2 DIABETES MELLITUS WITHOUT COMPLICATION, WITHOUT LONG-TERM CURRENT USE OF INSULIN (H): Primary | ICD-10-CM

## 2018-02-28 NOTE — TELEPHONE ENCOUNTER
11:45 AM    Reason for Call: Phone Call    Description: Pt calling and stating that she thought that Dr was going to order Vitamin D to [facility] when she was in on Monday. The name of it is [facility]. Please call her to advise.     Was an appointment offered for this call? No  If yes : Appointment type              Date    Preferred method for responding to this message: Telephone Call  What is your phone number ? [phone]    If we cannot reach you directly, may we leave a detailed response at the number you provided? Yes    Can this message wait until your PCP/provider returns, if available today?     Mae Acosta

## 2018-03-02 ENCOUNTER — DOCUMENTATION ONLY (OUTPATIENT)
Dept: FAMILY MEDICINE | Facility: OTHER | Age: 53
End: 2018-03-02

## 2018-03-02 PROBLEM — N20.0 KIDNEY STONE ON LEFT SIDE: Status: ACTIVE | Noted: 2017-08-07

## 2018-03-08 ENCOUNTER — TELEPHONE (OUTPATIENT)
Dept: FAMILY MEDICINE | Facility: OTHER | Age: 53
End: 2018-03-08

## 2018-03-08 DIAGNOSIS — E78.2 MIXED HYPERLIPIDEMIA: Primary | ICD-10-CM

## 2018-03-08 NOTE — TELEPHONE ENCOUNTER
1:53 PM    Reason for Call: Phone Call    Description: Pt called and stated she saw her cholesterol lab came back and is high. Wondering if she needs to increase her dosage of her Lipitor? Please call her back at [phone]    Was an appointment offered for this call? No  If yes : Appointment type              Date    Preferred method for responding to this message: Telephone Call  What is your phone number ?    If we cannot reach you directly, may we leave a detailed response at the number you provided? No, pt does not have voicemail    Can this message wait until your PCP/provider returns, if available today? YES, aware provider out until tomorrow afternoon    Digna Regan

## 2018-03-09 ENCOUNTER — HOSPITAL ENCOUNTER (OUTPATIENT)
Dept: CT IMAGING | Facility: HOSPITAL | Age: 53
Discharge: HOME | End: 2018-03-09
Attending: FAMILY MEDICINE | Admitting: FAMILY MEDICINE
Payer: COMMERCIAL

## 2018-03-09 DIAGNOSIS — E11.9 TYPE 2 DIABETES MELLITUS WITHOUT COMPLICATION, WITHOUT LONG-TERM CURRENT USE OF INSULIN (H): ICD-10-CM

## 2018-03-09 DIAGNOSIS — R10.32 LLQ ABDOMINAL PAIN: ICD-10-CM

## 2018-03-09 LAB
EST. AVERAGE GLUCOSE BLD GHB EST-MCNC: 140 MG/DL
HBA1C MFR BLD: 6.5 % (ref 4.3–6)

## 2018-03-09 PROCEDURE — 74177 CT ABD & PELVIS W/CONTRAST: CPT | Mod: TC

## 2018-03-09 PROCEDURE — 40000788 ZZHCL STATISTIC ESTIMATED AVERAGE GLUCOSE: Performed by: FAMILY MEDICINE

## 2018-03-09 PROCEDURE — 25000128 H RX IP 250 OP 636: Performed by: RADIOLOGY

## 2018-03-09 PROCEDURE — 36415 COLL VENOUS BLD VENIPUNCTURE: CPT | Performed by: FAMILY MEDICINE

## 2018-03-09 PROCEDURE — 83036 HEMOGLOBIN GLYCOSYLATED A1C: CPT | Performed by: FAMILY MEDICINE

## 2018-03-09 RX ORDER — ATORVASTATIN CALCIUM 40 MG/1
40 TABLET, FILM COATED ORAL DAILY
Qty: 30 TABLET | Refills: 1 | Status: SHIPPED | OUTPATIENT
Start: 2018-03-09 | End: 2018-04-23

## 2018-03-09 RX ORDER — IOPAMIDOL 612 MG/ML
100 INJECTION, SOLUTION INTRAVASCULAR ONCE
Status: COMPLETED | OUTPATIENT
Start: 2018-03-09 | End: 2018-03-09

## 2018-03-09 RX ADMIN — IOPAMIDOL 100 ML: 612 INJECTION, SOLUTION INTRAVENOUS at 16:05

## 2018-03-09 RX ADMIN — DIATRIZOATE MEGLUMINE AND DIATRIZOATE SODIUM 30 ML: 660; 100 SOLUTION ORAL; RECTAL at 16:06

## 2018-03-12 ENCOUNTER — TELEPHONE (OUTPATIENT)
Dept: FAMILY MEDICINE | Facility: OTHER | Age: 53
End: 2018-03-12

## 2018-03-12 DIAGNOSIS — R10.32 LLQ ABDOMINAL PAIN: Primary | ICD-10-CM

## 2018-03-12 NOTE — TELEPHONE ENCOUNTER
Patient stating she is still having pain everyday and wants referral to general surgery. Pended for you.

## 2018-03-15 DIAGNOSIS — Z98.890 POSTOPERATIVE STATE: ICD-10-CM

## 2018-03-16 ENCOUNTER — OFFICE VISIT (OUTPATIENT)
Dept: SURGERY | Facility: OTHER | Age: 53
End: 2018-03-16
Attending: FAMILY MEDICINE
Payer: COMMERCIAL

## 2018-03-16 VITALS
HEIGHT: 64 IN | DIASTOLIC BLOOD PRESSURE: 60 MMHG | SYSTOLIC BLOOD PRESSURE: 110 MMHG | HEART RATE: 75 BPM | BODY MASS INDEX: 23.05 KG/M2 | RESPIRATION RATE: 16 BRPM | WEIGHT: 135 LBS | OXYGEN SATURATION: 98 % | TEMPERATURE: 97.5 F

## 2018-03-16 DIAGNOSIS — R10.32 LLQ ABDOMINAL PAIN: ICD-10-CM

## 2018-03-16 PROCEDURE — 99214 OFFICE O/P EST MOD 30 MIN: CPT | Performed by: SURGERY

## 2018-03-16 PROCEDURE — G0463 HOSPITAL OUTPT CLINIC VISIT: HCPCS

## 2018-03-16 RX ORDER — MUPIROCIN 20 MG/G
OINTMENT TOPICAL
Qty: 22 G | Refills: 0 | Status: SHIPPED | OUTPATIENT
Start: 2018-03-16 | End: 2018-04-23

## 2018-03-16 ASSESSMENT — PAIN SCALES - GENERAL: PAINLEVEL: MODERATE PAIN (5)

## 2018-03-16 NOTE — NURSING NOTE
"Chief Complaint   Patient presents with     Consult     LLQ abdominal pain/ has had problems for years       Initial /60  Pulse 75  Temp 97.5  F (36.4  C)  Resp 16  Ht 5' 4\" (1.626 m)  Wt 135 lb (61.2 kg)  SpO2 98%  BMI 23.17 kg/m2 Estimated body mass index is 23.17 kg/(m^2) as calculated from the following:    Height as of this encounter: 5' 4\" (1.626 m).    Weight as of this encounter: 135 lb (61.2 kg).  Medication Reconciliation: complete   Tiffanie Ram LPN  "

## 2018-03-16 NOTE — TELEPHONE ENCOUNTER
Pt called to check on status of med. Stated she will need this for tomorrow. Advised pt of 72 business hour turn around time.

## 2018-03-16 NOTE — PATIENT INSTRUCTIONS
Thank you for allowing Dr. Bueno and our surgical team to participate in your care. Please call with any scheduling questions or concerns to Demetria at [phone] or for nursing questions Tiffanie [phone]    [facility] will call you to set up ultrasound.

## 2018-03-16 NOTE — MR AVS SNAPSHOT
"              After Visit Summary   3/16/2018    Katherine Royal    MRN: [id number]           Patient Information     Date Of Birth          1965        Visit Information        Provider Department      3/16/2018 3:00 PM Shakir Bueno MD [facility]        Today's Diagnoses     LLQ abdominal pain          Care Instructions    Thank you for allowing Dr. Bueno and our surgical team to participate in your care. Please call with any scheduling questions or concerns to Demetria at [phone] or for nursing questions Tiffanie [phone]    [facility] will call you to set up ultrasound.           Follow-ups after your visit        Who to contact     If you have questions or need follow up information about today's clinic visit or your schedule please contact [facility] directly at [phone].  Normal or non-critical lab and imaging results will be communicated to you by MyChart, letter or phone within 4 business days after the clinic has received the results. If you do not hear from us within 7 days, please contact the clinic through MyChart or phone. If you have a critical or abnormal lab result, we will notify you by phone as soon as possible.  Submit refill requests through UiTV or call your pharmacy and they will forward the refill request to us. Please allow 3 business days for your refill to be completed.          Additional Information About Your Visit        MyChart Information     UiTV lets you send messages to your doctor, view your test results, renew your prescriptions, schedule appointments and more. To sign up, go to [email or web address] . Click on \"Log in\" on the left side of the screen, which will take you to the Welcome page. Then click on \"Sign up Now\" on the right side of the page.     You will be asked to enter the access code listed below, as well as some personal information. Please follow the directions to create your username and password.   " "  Your access code is: [email or web address]  Expires: 2018  3:32 PM     Your access code will  in 90 days. If you need help or a new code, please call your [place] clinic or [phone].        Care EveryWhere ID     This is your Care EveryWhere ID. This could be used by other organizations to access your [place] medical records  [id number]        Your Vitals Were     Pulse Temperature Respirations Height Pulse Oximetry BMI (Body Mass Index)    75 97.5  F (36.4  C) 16 5' 4\" (1.626 m) 98% 23.17 kg/m2       Blood Pressure from Last 3 Encounters:   18 110/60   18 110/62   17 116/78    Weight from Last 3 Encounters:   18 135 lb (61.2 kg)   18 133 lb (60.3 kg)   17 133 lb (60.3 kg)              Today, you had the following     No orders found for display       Primary Care Provider Office Phone # Fax #    Delbert Rosas [phone] [address] [address]        Equal Access to Services     [facility]: Hadii renetta casianoo Solawrence, waaxda luqadaha, qaybta kaalmada adebrittanyda, calvin paulson. So [facility] [phone].    ATENCIÓN: Si habla español, tiene a henson disposición servicios gratuitos de asistencia lingüística. [facility] [phone].    We comply with applicable federal civil rights laws and [place] laws. We do not discriminate on the basis of race, color, national origin, age, disability, sex, sexual orientation, or gender identity.            Thank you!     Thank you for choosing [facility]  for your care. Our goal is always to provide you with excellent care. Hearing back from our patients is one way we can continue to improve our services. Please take a few minutes to complete the written survey that you may receive in the mail after your visit with us. Thank you!             Your Updated Medication List - Protect others around you: Learn how to safely use, store and throw away your " medicines at www.disposemymeds.org.          This list is accurate as of 3/16/18  3:41 PM.  Always use your most recent med list.                   Brand Name Dispense Instructions for use Diagnosis    ASPIRIN LOW DOSE 81 MG EC tablet   Generic drug:  aspirin     100 tablet    TAKE ONE TABLET BY MOUTH ONCE DAILY    Type 2 diabetes mellitus without complication, unspecified long term insulin use status (H)       * atorvastatin 10 MG tablet    LIPITOR    90 tablet    Take 1 tablet (10 mg) by mouth daily        * atorvastatin 40 MG tablet    LIPITOR    30 tablet    Take 1 tablet (40 mg) by mouth daily    Mixed hyperlipidemia       * blood glucose lancing device           * blood glucose lancets standard    no brand specified    100 each    Use to test blood sugar 2 times daily or as directed.    Type 2 diabetes mellitus without complication, without long-term current use of insulin (H)       blood glucose monitoring test strip    no brand specified    100 strip    Check blood glucose twice daily    Type 2 diabetes mellitus without complication, without long-term current use of insulin (H)       cloNIDine 0.1 MG tablet    CATAPRES    90 tablet    TAKE ONE TABLET BY MOUTH AT BEDTIME FOR  ELEVATED  BLOOD  PRESSURE.    Benign essential hypertension       hydrochlorothiazide 12.5 MG Tabs tablet     90 tablet    Take 1 tablet (12.5 mg) by mouth daily        losartan 50 MG tablet    COZAAR    135 tablet    Take 1.5 tablets (75 mg) by mouth daily        metFORMIN 500 MG tablet    GLUCOPHAGE    180 tablet    Take 2 tablets (1,000 mg) by mouth 2 times daily (with meals)    Type 2 diabetes mellitus without complication, without long-term current use of insulin (H)       mupirocin 2 % cream    BACTROBAN    30 g    Apply topically 3 times daily    Postoperative state       mupirocin 2 % ointment    BACTROBAN    22 g    APPLY  OINTMENT EXTERNALLY TO AFFECTED AREA THREE TIMES DAILY    Postoperative state       vitamin B complex with  vitamin C Tabs tablet     100 tablet    Take 1 tablet by mouth daily    Type 2 diabetes mellitus without complication, without long-term current use of insulin (H)       * Notice:  This list has 4 medication(s) that are the same as other medications prescribed for you. Read the directions carefully, and ask your doctor or other care provider to review them with you.

## 2018-03-16 NOTE — PROGRESS NOTES
[facility] Surgery Consultation    CC:  Left lower quadrant abdominal pain    HPI:  This 52 year old year old female is seen at the request of Delbert Rosas for evaluation of left lower quadrant abdominal pain.  The history is obtained from the patient, and reviewing the medical record.  She is good medical historian. She says that she has had pain in her left lower abdomen for the past 10 years. She has undergone multiple studies for this abdominal pain. She had a colonoscopy two years ago with no identification of any abnormalities. She then had a diagnostic laparoscopy with gynecology and fulguration of endometriosis. She says that since that time she continues to have pain in her left lower abdomen. She says that the pain may be sharp in nature and then ease up. The pain can radiate around her left flank to her back. She also has some pain that goes down the medial aspect of her leg. She has had no hematochezia, melena, constipation, or diarrhea. She has not had any abdominal bloating or cramping. She has not upper GI symptoms including reflux, regurgitation, indigestion. She says the pain is constant and it has not changed over the past 10 years.     No past medical history on file.    Past Surgical History:   Procedure Laterality Date     APPENDECTOMY       LAPAROSCOPY DIAGNOSTIC (GYN) N/A 5/10/2017    Procedure: LAPAROSCOPY DIAGNOSTIC (GYN);  DIAGNOSTIC LAPAROSCOPY, WITH LEFT SALPINGO-OOPHORECTOMY,FULGERATION OF ENDOMETRIOSIS, LYSIS OF ADHESIONS;  Surgeon: Misael Vance MD;  Location: [facility]       Pt denied problems with bleeding or anesthesia    Prior to Admission medications    Medication Sig Start Date End Date Taking? Authorizing Provider   mupirocin (BACTROBAN) 2 % ointment APPLY  OINTMENT EXTERNALLY TO AFFECTED AREA THREE TIMES DAILY 3/16/18  Yes ELIN Hutchison MD   atorvastatin (LIPITOR) 40 MG tablet Take 1 tablet (40 mg) by mouth daily 3/9/18  Yes Delbert Rosas MD   vitamin B complex with  "vitamin C (VITAMIN  B COMPLEX) TABS tablet Take 1 tablet by mouth daily 2/28/18  Yes Delbert Rosas MD   metFORMIN (GLUCOPHAGE) 500 MG tablet Take 2 tablets (1,000 mg) by mouth 2 times daily (with meals) 11/24/17  Yes Delbert Rosas MD   blood glucose monitoring (NO BRAND SPECIFIED) test strip Check blood glucose twice daily 11/24/17  Yes Delbert Rosas MD   blood glucose (NO BRAND SPECIFIED) lancets standard Use to test blood sugar 2 times daily or as directed. 11/24/17  Yes Delbert Rosas MD   ASPIRIN LOW DOSE 81 MG EC tablet TAKE ONE TABLET BY MOUTH ONCE DAILY 11/15/17  Yes Delbert Rosas MD   losartan (COZAAR) 50 MG tablet Take 1.5 tablets (75 mg) by mouth daily 8/7/17  Yes Delbert Rosas MD   atorvastatin (LIPITOR) 10 MG tablet Take 1 tablet (10 mg) by mouth daily 8/7/17  Yes Delbert Rosas MD   hydrochlorothiazide 12.5 MG TABS tablet Take 1 tablet (12.5 mg) by mouth daily 8/7/17  Yes Delbert Rosas MD   cloNIDine (CATAPRES) 0.1 MG tablet TAKE ONE TABLET BY MOUTH AT BEDTIME FOR  ELEVATED  BLOOD  PRESSURE. 8/7/17  Yes Delbert Rosas MD   mupirocin (BACTROBAN) 2 % cream Apply topically 3 times daily 5/17/17  Yes Delbert Rosas MD   blood glucose (ACCU-CHEK FASTCLIX) lancing device  9/26/16  Yes Reported, Patient        No Known Allergies      HABITS:    Social History   Substance Use Topics     Smoking status: Never Smoker     Smokeless tobacco: Never Used     Alcohol use No     No mood altering drug use.    Family History   Problem Relation Age of Onset     DIABETES Mother      Hypertension Mother      DIABETES Father        REVIEW OF SYSTEMS:  Ten point review of systems negative except those mentioned in the HPI.     The patient denies sleep apnea, latex allergies or MRSA    OBJECTIVE:    /60  Pulse 75  Temp 97.5  F (36.4  C)  Resp 16  Ht 5' 4\" (1.626 m)  Wt 135 lb (61.2 kg)  SpO2 98%  BMI 23.17 kg/m2    GENERAL: Generally appears well, in no distress with appropriate " affect.  HEENT:   Sclerae anicteric - No cervical, supra/infraclavicular lymphadenopathy, no thyroid masses  Respiratory:  Lungs clear to ausculation bilaterally with good air excursion  Cardiovascular:  Regular Rate and Rhythm with no murmurs gallops or rubs, normal   Abdomen: soft, non-tender, non-distended, no inguinal hernias palpated with Valsalva bilaterally  :  deferred  Extremities:  Extremities normal. No deformities, edema, or skin discoloration.  Skin:  no suspicious lesions or rashes  Neurological: grossly intact  Vascular: 2+ femoral pulses bilateral, 1+ popliteal pulse bilateral, 2+ PT pulse bilateral  Psych:  Alert, oriented, affect appropriate with normal decision making ability.      IMPRESSION:  52 year old female with chronic left lower quadrant abdominal pain    PLAN:  At this point I discussed with the patient that her left lower quadrant abdominal pain has no clear etiology. I recommend that she undergo ultrasound to evaluate for possible inguinal/femoral hernia. Once she undergoes an ultrasound and if there is no clear identification of a hernia I recommend that she could be evaluated at a pain clinic for evaluation for possible injections. All questions and concerns were addressed.    Thank you for allowing me to participate in the care of your patient.     45 minutes was spent with the patient and over 50% spent on counseling.      Shakir Bueno MD    3/16/2018  4:02 PM    cc:  Delbert Rosas

## 2018-03-21 ENCOUNTER — HOSPITAL ENCOUNTER (OUTPATIENT)
Dept: ULTRASOUND IMAGING | Facility: HOSPITAL | Age: 53
Discharge: HOME | End: 2018-03-21
Attending: SURGERY | Admitting: SURGERY
Payer: COMMERCIAL

## 2018-03-21 DIAGNOSIS — R10.32 LLQ ABDOMINAL PAIN: ICD-10-CM

## 2018-03-21 PROCEDURE — 76705 ECHO EXAM OF ABDOMEN: CPT | Mod: TC

## 2018-03-22 ENCOUNTER — TELEPHONE (OUTPATIENT)
Dept: FAMILY MEDICINE | Facility: OTHER | Age: 53
End: 2018-03-22

## 2018-03-22 DIAGNOSIS — R10.32 LLQ ABDOMINAL PAIN: Primary | ICD-10-CM

## 2018-03-22 NOTE — TELEPHONE ENCOUNTER
Notified patient due to dx of diabetes and high cholesterol this most likely will be a long term medication. Notified patient to continue taking the lipitor daily as prescribed  CARYL ORDAZ

## 2018-03-22 NOTE — TELEPHONE ENCOUNTER
9:18 AM    Reason for Call: Phone Call    Description: Katherine would like to know how long she Is supposed to take  The 40 mg Lipitor. Please call Katherine to advise    Was an appointment offered for this call?  No    Preferred method for responding to this message: [phone]    If we cannot reach you directly, may we leave a detailed response at the number you provided?  No    Can this message wait until your PCP/provider returns, if available today?  Yes    Katheryn Cabrera

## 2018-03-23 ENCOUNTER — TELEPHONE (OUTPATIENT)
Dept: SURGERY | Facility: OTHER | Age: 53
End: 2018-03-23

## 2018-03-23 NOTE — TELEPHONE ENCOUNTER
1:12 PM    Reason for Call: Phone Call    Description: Pt called to check on status of referral to pain clinic that Dr Bueno was putting in for her. She is concerned as she will be losing her ins soon and wants to get appt ASAP. Please call her back at [phone]    Was an appointment offered for this call? No  If yes : Appointment type              Date    Preferred method for responding to this message: Telephone Call  What is your phone number ?    If we cannot reach you directly, may we leave a detailed response at the number you provided? Yes    Can this message wait until your PCP/provider returns, if available today? No, provider out and pt would like call RADHA Regan

## 2018-03-26 ENCOUNTER — TELEPHONE (OUTPATIENT)
Dept: SURGERY | Facility: OTHER | Age: 53
End: 2018-03-26

## 2018-03-26 DIAGNOSIS — E78.2 MIXED HYPERLIPIDEMIA: ICD-10-CM

## 2018-03-26 NOTE — TELEPHONE ENCOUNTER
atorvastatin (LIPITOR) 40 MG tablet     Last Written Prescription Date:  3/9/18  Last Fill Quantity: 30,   # refills: 1  Last Office Visit: 2/26/18  Future Office visit:       Note: patient states taking 20 mg tablets now needs new RX

## 2018-03-26 NOTE — TELEPHONE ENCOUNTER
Patient called because [facility] called her and informed her that they do not have an opening until July for this patient and she does not want to wait this long for an appt. Please advise when Dr Bueno returns. Patient is aware that Dr Bueno and his nurse are out of the office until the PM of 3-27-18.

## 2018-03-27 RX ORDER — ATORVASTATIN CALCIUM 40 MG/1
20 TABLET, FILM COATED ORAL DAILY
Qty: 30 TABLET | Refills: 1 | OUTPATIENT
Start: 2018-03-27

## 2018-03-27 NOTE — TELEPHONE ENCOUNTER
Dr Bueno stated we have no surgical intervention for this issue. She does not have any surgical problems and she needs to follow up with her primary care physician. Called patient back and transferred her to Dr Smith office. Tiffanie Ram

## 2018-04-06 ENCOUNTER — OFFICE VISIT (OUTPATIENT)
Dept: FAMILY MEDICINE | Facility: OTHER | Age: 53
End: 2018-04-06
Attending: FAMILY MEDICINE
Payer: COMMERCIAL

## 2018-04-06 ENCOUNTER — RADIANT APPOINTMENT (OUTPATIENT)
Dept: MAMMOGRAPHY | Facility: OTHER | Age: 53
End: 2018-04-06
Attending: FAMILY MEDICINE
Payer: COMMERCIAL

## 2018-04-06 VITALS
HEART RATE: 82 BPM | HEIGHT: 64 IN | OXYGEN SATURATION: 98 % | BODY MASS INDEX: 24.41 KG/M2 | WEIGHT: 143 LBS | SYSTOLIC BLOOD PRESSURE: 112 MMHG | DIASTOLIC BLOOD PRESSURE: 78 MMHG | RESPIRATION RATE: 16 BRPM | TEMPERATURE: 97.4 F

## 2018-04-06 DIAGNOSIS — N63.20 MASSES OF BOTH BREASTS: ICD-10-CM

## 2018-04-06 DIAGNOSIS — R10.32 LLQ ABDOMINAL PAIN: ICD-10-CM

## 2018-04-06 DIAGNOSIS — Z98.890 HISTORY OF BILATERAL BREAST BIOPSY: ICD-10-CM

## 2018-04-06 DIAGNOSIS — K21.9 GASTROESOPHAGEAL REFLUX DISEASE, ESOPHAGITIS PRESENCE NOT SPECIFIED: Primary | ICD-10-CM

## 2018-04-06 DIAGNOSIS — N63.10 MASSES OF BOTH BREASTS: ICD-10-CM

## 2018-04-06 PROCEDURE — G0463 HOSPITAL OUTPT CLINIC VISIT: HCPCS

## 2018-04-06 PROCEDURE — 99214 OFFICE O/P EST MOD 30 MIN: CPT | Performed by: FAMILY MEDICINE

## 2018-04-06 PROCEDURE — G0279 TOMOSYNTHESIS, MAMMO: HCPCS | Mod: TC

## 2018-04-06 RX ORDER — OMEPRAZOLE 40 MG/1
40 CAPSULE, DELAYED RELEASE ORAL DAILY
Qty: 30 CAPSULE | Refills: 1 | Status: SHIPPED | OUTPATIENT
Start: 2018-04-06 | End: 2018-06-26

## 2018-04-06 ASSESSMENT — ANXIETY QUESTIONNAIRES
4. TROUBLE RELAXING: NOT AT ALL
2. NOT BEING ABLE TO STOP OR CONTROL WORRYING: SEVERAL DAYS
1. FEELING NERVOUS, ANXIOUS, OR ON EDGE: NOT AT ALL
5. BEING SO RESTLESS THAT IT IS HARD TO SIT STILL: NOT AT ALL
6. BECOMING EASILY ANNOYED OR IRRITABLE: SEVERAL DAYS
IF YOU CHECKED OFF ANY PROBLEMS ON THIS QUESTIONNAIRE, HOW DIFFICULT HAVE THESE PROBLEMS MADE IT FOR YOU TO DO YOUR WORK, TAKE CARE OF THINGS AT HOME, OR GET ALONG WITH OTHER PEOPLE: NOT DIFFICULT AT ALL
7. FEELING AFRAID AS IF SOMETHING AWFUL MIGHT HAPPEN: NOT AT ALL
3. WORRYING TOO MUCH ABOUT DIFFERENT THINGS: NOT AT ALL
GAD7 TOTAL SCORE: 2

## 2018-04-06 ASSESSMENT — PAIN SCALES - GENERAL: PAINLEVEL: MODERATE PAIN (5)

## 2018-04-06 NOTE — LETTER
[facility] [place]  [address]  [place] [address]  Phone: [phone]    April 6, 2018        Katherine Royal  [address] [address]  [place] [address]          To whom it may concern:    RE: Katherine Royal    Patient was seen and treated today at our clinic and missed work.  She was unable to work for medical reasons.    Please contact me for questions or concerns.      Sincerely,        Delbert Rosas MD

## 2018-04-06 NOTE — PROGRESS NOTES
SUBJECTIVE:   Katherine Royal is a 52 year old female who presents to clinic today for the following health issues:      ABDOMINAL and FLANK PAIN     Onset: 6 weeks    Description:   Character: Dull ache and Gnawing  Location: left lower quadrant left flank  Radiation: None    Intensity: moderate    Progression of Symptoms:  same    Accompanying Signs & Symptoms:  Fever/Chills?: no   Gas/Bloating: no   Nausea: no   Vomitting: no   Diarrhea?: no   Constipation:no   Dysuria or Hematuria: no    History:   Trauma: no   Previous similar pain: YES   Previous tests done: CT and Colonoscopy    Precipitating factors:   Does the pain change with:     Food: no      BM: no     Urination: no     Alleviating factors:  Tylenol Rest    Therapies Tried and outcome: Some heartburn improved with over the counter antacids    LMP:  not applicable     Katherine was recently evaluated by General Surgery and she underwent repeat ultrasound which was negative.  It was recommended that she be seen by pain clinic in light of the chronic nature of this.  Discussed with patient and spouse.    Problem list and histories reviewed & adjusted, as indicated.  Additional history: as documented    Patient Active Problem List   Diagnosis     Type 2 diabetes mellitus without complication, non insulin requiring (H)     HTN (hypertension)     Perimenopausal     Kidney stone on left side     Prediabetes     Primary insomnia     Past Surgical History:   Procedure Laterality Date     APPENDECTOMY       LAPAROSCOPY DIAGNOSTIC (GYN) N/A 5/10/2017    Procedure: LAPAROSCOPY DIAGNOSTIC (GYN);  DIAGNOSTIC LAPAROSCOPY, WITH LEFT SALPINGO-OOPHORECTOMY,FULGERATION OF ENDOMETRIOSIS, LYSIS OF ADHESIONS;  Surgeon: Misael Vance MD;  Location: [facility]       Social History   Substance Use Topics     Smoking status: Never Smoker     Smokeless tobacco: Never Used     Alcohol use No     Family History   Problem Relation Age of Onset     DIABETES Mother      Hypertension Mother   "    DIABETES Father          Current Outpatient Prescriptions   Medication Sig Dispense Refill     omeprazole (PRILOSEC) 40 MG capsule Take 1 capsule (40 mg) by mouth daily Take 30-60 minutes before a meal. 30 capsule 1     mupirocin (BACTROBAN) 2 % ointment APPLY  OINTMENT EXTERNALLY TO AFFECTED AREA THREE TIMES DAILY 22 g 0     atorvastatin (LIPITOR) 40 MG tablet Take 1 tablet (40 mg) by mouth daily 30 tablet 1     vitamin B complex with vitamin C (VITAMIN  B COMPLEX) TABS tablet Take 1 tablet by mouth daily 100 tablet 3     metFORMIN (GLUCOPHAGE) 500 MG tablet Take 2 tablets (1,000 mg) by mouth 2 times daily (with meals) 180 tablet 3     blood glucose monitoring (NO BRAND SPECIFIED) test strip Check blood glucose twice daily 100 strip 3     blood glucose (NO BRAND SPECIFIED) lancets standard Use to test blood sugar 2 times daily or as directed. 100 each 11     ASPIRIN LOW DOSE 81 MG EC tablet TAKE ONE TABLET BY MOUTH ONCE DAILY 100 tablet 1     losartan (COZAAR) 50 MG tablet Take 1.5 tablets (75 mg) by mouth daily 135 tablet 3     hydrochlorothiazide 12.5 MG TABS tablet Take 1 tablet (12.5 mg) by mouth daily 90 tablet 3     cloNIDine (CATAPRES) 0.1 MG tablet TAKE ONE TABLET BY MOUTH AT BEDTIME FOR  ELEVATED  BLOOD  PRESSURE. 90 tablet 5     mupirocin (BACTROBAN) 2 % cream Apply topically 3 times daily 30 g 0     blood glucose (ACCU-CHEK FASTCLIX) lancing device        No Known Allergies    Reviewed and updated as needed this visit by clinical staff  Tobacco  Allergies  Meds  Problems  Med Hx  Surg Hx  Fam Hx  Soc Hx        Reviewed and updated as needed this visit by Provider         ROS:  Constitutional, HEENT, cardiovascular, pulmonary, gi and gu systems are negative, except as otherwise noted.    OBJECTIVE:     /78 (BP Location: Left arm, Patient Position: Chair, Cuff Size: Adult Regular)  Pulse 82  Temp 97.4  F (36.3  C) (Tympanic)  Resp 16  Ht 5' 4\" (1.626 m)  Wt 143 lb (64.9 kg)  SpO2 98% "  BMI 24.55 kg/m2  Body mass index is 24.55 kg/(m^2).  Physical Exam   Constitutional: She is oriented to person, place, and time. She appears well-developed and well-nourished. No distress.   Abdominal: Soft. Bowel sounds are normal. She exhibits no distension and no mass. There is no hepatosplenomegaly. There is tenderness in the left lower quadrant. There is no CVA tenderness. No hernia.   Neurological: She is alert and oriented to person, place, and time.   Psychiatric: She has a normal mood and affect.       Other exam not repeated.  Diagnostic Test Results:  none     ASSESSMENT/PLAN:       Gastroesophageal reflux disease, esophagitis presence not specified  She notes reflux symptoms.  Begin omeprazole as written.  Dietary measures discussed.  - omeprazole (PRILOSEC) 40 MG capsule; Take 1 capsule (40 mg) by mouth daily Take 30-60 minutes before a meal.    LLQ abdominal pain  Long discussion was held regarding potential etiologies and treatment.  Surgical notes reviewed.  She will be referred for Pain Management consultation.  Consider lumbosacral MRI.            Delbert Rosas MD  [facility]

## 2018-04-06 NOTE — LETTER
[facility]  [address]  [place] [address]                                                                                                            Katherine Royal  [address] [address]  [place] [address]      April 6, 2018  Date of Exam: 4/6/18    Dear Katherine:    Thank you for your recent visit.  Breast Imaging Result: We are pleased to inform you that the results of your recent breast imaging show no evidence of malignancy (cancer). Bilateral mammogram recommended in 1 year.    If you are experiencing any breast problems such as a lump or localized pain we request that you discuss this with your health care provider if you haven t already done so, as additional testing may be necessary.    As you know, early detection of cancer is very important. Although mammography is the most accurate method for early detection, not all cancers are found through mammography. A thorough examination includes a combination of mammography, physical examination and breast self-examination. Currently the American College of Radiology and the Society of Breast Imaging recommend an annual mammogram for all women beginning at the age of 40.    A report of your breast imaging results was sent to: [facility]    Your breast imaging will become part of your medical file here at [facility] for at least 10 years. You are responsible for informing any new health care provider or breast imaging facility of the date and location of this examination.    We appreciate the opportunity to participate in your health care.    Sincerely,    Almita Hernandez MD  Interpreting Radiologist  [facility]

## 2018-04-06 NOTE — MR AVS SNAPSHOT
"              After Visit Summary   4/6/2018    Katherine Royal    MRN: [id number]           Patient Information     Date Of Birth          1965        Visit Information        Provider Department      4/6/2018 3:00 PM Delbert Rosas MD [facility]        Today's Diagnoses     Gastroesophageal reflux disease, esophagitis presence not specified    -  1    LLQ abdominal pain          Care Instructions    Appointment with Pain Clinic scheduled for evaluation and recommendations for further management.           Follow-ups after your visit        Follow-up notes from your care team     Return in about 3 months (around 7/6/2018) for Follow up visit diabetes.      Who to contact     If you have questions or need follow up information about today's clinic visit or your schedule please contact [facility] [place] directly at [phone].  Normal or non-critical lab and imaging results will be communicated to you by MyChart, letter or phone within 4 business days after the clinic has received the results. If you do not hear from us within 7 days, please contact the clinic through MyChart or phone. If you have a critical or abnormal lab result, we will notify you by phone as soon as possible.  Submit refill requests through [employer] or call your pharmacy and they will forward the refill request to us. Please allow 3 business days for your refill to be completed.          Additional Information About Your Visit        [employer] Information     [employer] lets you send messages to your doctor, view your test results, renew your prescriptions, schedule appointments and more. To sign up, go to [email or web address] . Click on \"Log in\" on the left side of the screen, which will take you to the Welcome page. Then click on \"Sign up Now\" on the right side of the page.     You will be asked to enter the access code listed below, as well as some personal information. Please follow the directions to create your " "username and password.     Your access code is: [email or web address]  Expires: 2018  3:32 PM     Your access code will  in 90 days. If you need help or a new code, please call your [facility] or [phone].        Care EveryWhere ID     This is your Care EveryWhere ID. This could be used by other organizations to access your [facility] medical records  [id number]        Your Vitals Were     Pulse Temperature Respirations Height Pulse Oximetry BMI (Body Mass Index)    82 97.4  F (36.3  C) (Tympanic) 16 5' 4\" (1.626 m) 98% 24.55 kg/m2       Blood Pressure from Last 3 Encounters:   18 112/78   18 110/60   18 110/62    Weight from Last 3 Encounters:   18 143 lb (64.9 kg)   18 135 lb (61.2 kg)   18 133 lb (60.3 kg)              Today, you had the following     No orders found for display         Today's Medication Changes          These changes are accurate as of 18 11:59 PM.  If you have any questions, ask your nurse or doctor.               Start taking these medicines.        Dose/Directions    omeprazole 40 MG capsule   Commonly known as:  priLOSEC   Used for:  Gastroesophageal reflux disease, esophagitis presence not specified   Started by:  Delbert Rosas MD        Dose:  40 mg   Take 1 capsule (40 mg) by mouth daily Take 30-60 minutes before a meal.   Quantity:  30 capsule   Refills:  1         These medicines have changed or have updated prescriptions.        Dose/Directions    atorvastatin 40 MG tablet   Commonly known as:  LIPITOR   This may have changed:  Another medication with the same name was removed. Continue taking this medication, and follow the directions you see here.   Used for:  Mixed hyperlipidemia   Changed by:  Delbert Rosas MD        Dose:  40 mg   Take 1 tablet (40 mg) by mouth daily   Quantity:  30 tablet   Refills:  1            Where to get your medicines      These medications were sent to [facility] [address] [place] [address] - [facility] " [address], [facility] [address]     Phone:  [phone]     omeprazole 40 MG capsule                Primary Care Provider Office Phone # Fax #    Delbert Rosas [phone] [address] [address]        Equal Access to Services     SRUTHI FISHER : Hadii aad ku hadasho Soomaali, waaxda luqadaha, qaybta kaalmada adeegyada, waxay gusin hayaan adehector arielcuate lakaylen . So [facility] [phone].    ATENCIÓN: Si habla español, tiene a henson disposición servicios gratuitos de asistencia lingüística. Gloria al [phone].    We comply with applicable federal civil rights laws and [place] laws. We do not discriminate on the basis of race, color, national origin, age, disability, sex, sexual orientation, or gender identity.            Thank you!     Thank you for choosing [facility]  for your care. Our goal is always to provide you with excellent care. Hearing back from our patients is one way we can continue to improve our services. Please take a few minutes to complete the written survey that you may receive in the mail after your visit with us. Thank you!             Your Updated Medication List - Protect others around you: Learn how to safely use, store and throw away your medicines at www.disposemymeds.org.          This list is accurate as of 4/6/18 11:59 PM.  Always use your most recent med list.                   Brand Name Dispense Instructions for use Diagnosis    ASPIRIN LOW DOSE 81 MG EC tablet   Generic drug:  aspirin     100 tablet    TAKE ONE TABLET BY MOUTH ONCE DAILY    Type 2 diabetes mellitus without complication, unspecified long term insulin use status (H)       atorvastatin 40 MG tablet    LIPITOR    30 tablet    Take 1 tablet (40 mg) by mouth daily    Mixed hyperlipidemia       * blood glucose lancing device           * blood glucose lancets standard    no brand specified    100 each    Use to test blood sugar 2 times  daily or as directed.    Type 2 diabetes mellitus without complication, without long-term current use of insulin (H)       blood glucose monitoring test strip    no brand specified    100 strip    Check blood glucose twice daily    Type 2 diabetes mellitus without complication, without long-term current use of insulin (H)       cloNIDine 0.1 MG tablet    CATAPRES    90 tablet    TAKE ONE TABLET BY MOUTH AT BEDTIME FOR  ELEVATED  BLOOD  PRESSURE.    Benign essential hypertension       hydrochlorothiazide 12.5 MG Tabs tablet     90 tablet    Take 1 tablet (12.5 mg) by mouth daily        losartan 50 MG tablet    COZAAR    135 tablet    Take 1.5 tablets (75 mg) by mouth daily        metFORMIN 500 MG tablet    GLUCOPHAGE    180 tablet    Take 2 tablets (1,000 mg) by mouth 2 times daily (with meals)    Type 2 diabetes mellitus without complication, without long-term current use of insulin (H)       mupirocin 2 % cream    BACTROBAN    30 g    Apply topically 3 times daily    Postoperative state       mupirocin 2 % ointment    BACTROBAN    22 g    APPLY  OINTMENT EXTERNALLY TO AFFECTED AREA THREE TIMES DAILY    Postoperative state       omeprazole 40 MG capsule    priLOSEC    30 capsule    Take 1 capsule (40 mg) by mouth daily Take 30-60 minutes before a meal.    Gastroesophageal reflux disease, esophagitis presence not specified       vitamin B complex with vitamin C Tabs tablet     100 tablet    Take 1 tablet by mouth daily    Type 2 diabetes mellitus without complication, without long-term current use of insulin (H)       * Notice:  This list has 2 medication(s) that are the same as other medications prescribed for you. Read the directions carefully, and ask your doctor or other care provider to review them with you.

## 2018-04-07 ASSESSMENT — ANXIETY QUESTIONNAIRES: GAD7 TOTAL SCORE: 2

## 2018-04-07 ASSESSMENT — PATIENT HEALTH QUESTIONNAIRE - PHQ9: SUM OF ALL RESPONSES TO PHQ QUESTIONS 1-9: 4

## 2018-04-09 ENCOUNTER — TELEPHONE (OUTPATIENT)
Dept: INTERNAL MEDICINE | Facility: OTHER | Age: 53
End: 2018-04-09

## 2018-04-09 NOTE — PATIENT INSTRUCTIONS
Appointment with Pain Clinic scheduled for evaluation and recommendations for further management.

## 2018-04-09 NOTE — TELEPHONE ENCOUNTER
9:53 AM    Reason for Call: Phone Call    Description: Patient is calling to see if Dr. Rosas can do injections for pain on the patient. She stated that she spoke with him last week.    Was an appointment offered for this call? No  If yes : Appointment type              Date    Preferred method for responding to this message: Telephone Call  What is your phone number ? [phone]    If we cannot reach you directly, may we leave a detailed response at the number you provided? Yes    Can this message wait until your PCP/provider returns, if available today? Not applicable,     Melly Garza

## 2018-04-09 NOTE — PROGRESS NOTES
Patient received results from tech and a results letter was handed to her before she left the department.

## 2018-04-11 NOTE — TELEPHONE ENCOUNTER
Pt called back for nurse. States she didn't fully understand the message that was left. She is asking that the nurse call her again at [phone].

## 2018-04-11 NOTE — TELEPHONE ENCOUNTER
Writer called patient back and explained that Dr. Rosas will be putting in a referral to the [facility] pain clinic and they will call her to schedule. Patient repeated and was of understanding of the information given.

## 2018-04-11 NOTE — TELEPHONE ENCOUNTER
Left message notifying patient that Dr. Rosas will be referring her to the [facility] pain clinic.

## 2018-04-13 ENCOUNTER — TELEPHONE (OUTPATIENT)
Dept: FAMILY MEDICINE | Facility: OTHER | Age: 53
End: 2018-04-13

## 2018-04-13 NOTE — TELEPHONE ENCOUNTER
Called patient and let her know that referral will be being placed and once it is it could take a few days to hear back.

## 2018-04-13 NOTE — TELEPHONE ENCOUNTER
10:20 AM    Reason for Call: Phone Call    Description: Pt states she was supposed to be getting a referral for pain management to the [facility]. States she has not heard anything from them yet, so wondering if there is a problem. Please review and give her a call back when you can. Thank you!    Was an appointment offered for this call? No  If yes : Appointment type              Date    Preferred method for responding to this message: Telephone Call  What is your phone number ? [phone]    If we cannot reach you directly, may we leave a detailed response at the number you provided? Yes    Can this message wait until your PCP/provider returns, if available today? YES, aware provider is out    Randy Ayers

## 2018-04-20 ENCOUNTER — TELEPHONE (OUTPATIENT)
Dept: FAMILY MEDICINE | Facility: OTHER | Age: 53
End: 2018-04-20

## 2018-04-20 DIAGNOSIS — R10.9 CHRONIC ABDOMINAL PAIN: Primary | ICD-10-CM

## 2018-04-20 DIAGNOSIS — G89.29 CHRONIC ABDOMINAL PAIN: Primary | ICD-10-CM

## 2018-04-20 NOTE — TELEPHONE ENCOUNTER
10:54 AM    Reason for Call: Phone Call    Description: Pt called and states that she would like a call back regarding some questions she has on the pain clinic     Was an appointment offered for this call? No  If yes : Appointment type              Date    Preferred method for responding to this message: Telephone Call  What is your phone number ?[phone]    If we cannot reach you directly, may we leave a detailed response at the number you provided? Yes    Can this message wait until your PCP/provider returns, if available today? Not applicable, PCP is in     [facility]

## 2018-04-23 DIAGNOSIS — Z98.890 POSTOPERATIVE STATE: ICD-10-CM

## 2018-04-23 DIAGNOSIS — E78.2 MIXED HYPERLIPIDEMIA: ICD-10-CM

## 2018-04-24 RX ORDER — ATORVASTATIN CALCIUM 40 MG/1
TABLET, FILM COATED ORAL
Qty: 90 TABLET | Refills: 2 | Status: SHIPPED | OUTPATIENT
Start: 2018-04-24 | End: 2018-04-25

## 2018-04-25 ENCOUNTER — TELEPHONE (OUTPATIENT)
Dept: PALLIATIVE MEDICINE | Facility: CLINIC | Age: 53
End: 2018-04-25

## 2018-04-25 DIAGNOSIS — E78.2 MIXED HYPERLIPIDEMIA: ICD-10-CM

## 2018-04-25 RX ORDER — ATORVASTATIN CALCIUM 40 MG/1
40 TABLET, FILM COATED ORAL DAILY
Qty: 90 TABLET | Refills: 2 | Status: SHIPPED | OUTPATIENT
Start: 2018-04-25 | End: 2018-06-26

## 2018-04-25 RX ORDER — MUPIROCIN 20 MG/G
OINTMENT TOPICAL
Qty: 22 G | Refills: 0 | Status: SHIPPED | OUTPATIENT
Start: 2018-04-25 | End: 2020-05-15

## 2018-04-25 NOTE — TELEPHONE ENCOUNTER
Incoming fax received on 4- from [facility] for a consult only for chronic abdominal pain, negative workup. Order is already on file. Routing to the scheduling coordinators to schedule a consult appointment.    Siobhan García  Patient Representative  [facility] [place]

## 2018-04-25 NOTE — TELEPHONE ENCOUNTER
Bactroban       Last Written Prescription Date:  3/16/2018  Last Fill Quantity: 22g,   # refills: 0  Last Office Visit: 4/06/2018  Future Office visit:

## 2018-04-26 NOTE — TELEPHONE ENCOUNTER
[facility] Referral  Pt will call back to schedule once she talks with her     1. Confirmed address with patient? Yes  2. Confirmed phone number with patient? Yes  3. Confirmed referring provider? Yes  4. Is the PCP the same as the referring provider? Yes  5. Has the patient been to any previous pain clinics? No  (If yes, send LUBNA with welcome letter)  6. Which insurance are we to bill for this appointment?  [employer]    7. Informed pt of cancellation (48 hour) policy? Yes    REGARDING OPIOID MEDICATIONS: We will always address appropriateness of opioid pain medications, but we generally will not automatically take on a prescribing role. When we do take on prescribing of opioids for chronic pain, it is in collaboration with the referring physician for an intermediate period of time (months), with an expectation that the primary physician or provider will assume the prescribing role if medications are effective at stable doses with demonstrated compliance. Therefore, please do not assume that your prescribing responsibilities end on the day of pain clinic consultation.  7. Informed pt of prescribing policy? Yes      8. Referring Provider: [facility]    9. Criteria for Triage Eval:   -Missed/Failed 1st DUAL appointment? N/A   -Medication Focused? N/A   -Mental Health Concerns? (e.g. Recent psych hospitalization/snap shot)? N/A   -Active substance abuse? N/A   -Patient behaviors (e.g. Offensive language/raised voice)? N/A

## 2018-04-26 NOTE — TELEPHONE ENCOUNTER
Left voicemail for patient to schedule consult only appt.        Danica GALLOWAY    [facility] [place]

## 2018-04-26 NOTE — TELEPHONE ENCOUNTER
Pt called to check on status of med. Advised this was sent to pharmacy yesterday late afternoon. Pt will check with them again

## 2018-06-26 ENCOUNTER — OFFICE VISIT (OUTPATIENT)
Dept: FAMILY MEDICINE | Facility: OTHER | Age: 53
End: 2018-06-26
Attending: FAMILY MEDICINE
Payer: COMMERCIAL

## 2018-06-26 VITALS
OXYGEN SATURATION: 98 % | DIASTOLIC BLOOD PRESSURE: 68 MMHG | HEIGHT: 64 IN | BODY MASS INDEX: 23.05 KG/M2 | WEIGHT: 135 LBS | RESPIRATION RATE: 16 BRPM | TEMPERATURE: 98.6 F | HEART RATE: 94 BPM | SYSTOLIC BLOOD PRESSURE: 100 MMHG

## 2018-06-26 DIAGNOSIS — E11.9 TYPE 2 DIABETES MELLITUS WITHOUT COMPLICATION, UNSPECIFIED LONG TERM INSULIN USE STATUS: ICD-10-CM

## 2018-06-26 DIAGNOSIS — E11.9 TYPE 2 DIABETES MELLITUS WITHOUT COMPLICATION, WITHOUT LONG-TERM CURRENT USE OF INSULIN (H): ICD-10-CM

## 2018-06-26 DIAGNOSIS — E78.2 MIXED HYPERLIPIDEMIA: ICD-10-CM

## 2018-06-26 DIAGNOSIS — I10 BENIGN ESSENTIAL HYPERTENSION: ICD-10-CM

## 2018-06-26 DIAGNOSIS — K21.9 GASTROESOPHAGEAL REFLUX DISEASE, ESOPHAGITIS PRESENCE NOT SPECIFIED: ICD-10-CM

## 2018-06-26 DIAGNOSIS — I10 BENIGN ESSENTIAL HYPERTENSION: Primary | ICD-10-CM

## 2018-06-26 PROBLEM — R73.03 PREDIABETES: Status: RESOLVED | Noted: 2017-01-17 | Resolved: 2018-06-26

## 2018-06-26 LAB
ALBUMIN SERPL-MCNC: 4.3 G/DL (ref 3.4–5)
ALP SERPL-CCNC: 96 U/L (ref 40–150)
ALT SERPL W P-5'-P-CCNC: 30 U/L (ref 0–50)
ANION GAP SERPL CALCULATED.3IONS-SCNC: 9 MMOL/L (ref 3–14)
AST SERPL W P-5'-P-CCNC: 21 U/L (ref 0–45)
BASOPHILS # BLD AUTO: 0.1 10E9/L (ref 0–0.2)
BASOPHILS NFR BLD AUTO: 0.9 %
BILIRUB SERPL-MCNC: 0.5 MG/DL (ref 0.2–1.3)
BUN SERPL-MCNC: 15 MG/DL (ref 7–30)
CALCIUM SERPL-MCNC: 9.9 MG/DL (ref 8.5–10.1)
CHLORIDE SERPL-SCNC: 106 MMOL/L (ref 94–109)
CO2 SERPL-SCNC: 25 MMOL/L (ref 20–32)
CREAT SERPL-MCNC: 0.75 MG/DL (ref 0.52–1.04)
DIFFERENTIAL METHOD BLD: NORMAL
EOSINOPHIL # BLD AUTO: 0.1 10E9/L (ref 0–0.7)
EOSINOPHIL NFR BLD AUTO: 2.6 %
ERYTHROCYTE [DISTWIDTH] IN BLOOD BY AUTOMATED COUNT: 12.9 % (ref 10–15)
EST. AVERAGE GLUCOSE BLD GHB EST-MCNC: 134 MG/DL
GFR SERPL CREATININE-BSD FRML MDRD: 81 ML/MIN/1.7M2
GLUCOSE SERPL-MCNC: 123 MG/DL (ref 70–99)
HBA1C MFR BLD: 6.3 % (ref 0–5.6)
HCT VFR BLD AUTO: 40.4 % (ref 35–47)
HGB BLD-MCNC: 13.8 G/DL (ref 11.7–15.7)
IMM GRANULOCYTES # BLD: 0 10E9/L (ref 0–0.4)
IMM GRANULOCYTES NFR BLD: 0.4 %
LYMPHOCYTES # BLD AUTO: 2 10E9/L (ref 0.8–5.3)
LYMPHOCYTES NFR BLD AUTO: 37.1 %
MCH RBC QN AUTO: 30 PG (ref 26.5–33)
MCHC RBC AUTO-ENTMCNC: 34.2 G/DL (ref 31.5–36.5)
MCV RBC AUTO: 88 FL (ref 78–100)
MONOCYTES # BLD AUTO: 0.4 10E9/L (ref 0–1.3)
MONOCYTES NFR BLD AUTO: 7.9 %
NEUTROPHILS # BLD AUTO: 2.7 10E9/L (ref 1.6–8.3)
NEUTROPHILS NFR BLD AUTO: 51.1 %
NRBC # BLD AUTO: 0 10*3/UL
NRBC BLD AUTO-RTO: 0 /100
PLATELET # BLD AUTO: 250 10E9/L (ref 150–450)
POTASSIUM SERPL-SCNC: 4 MMOL/L (ref 3.4–5.3)
PROT SERPL-MCNC: 7.8 G/DL (ref 6.8–8.8)
RBC # BLD AUTO: 4.6 10E12/L (ref 3.8–5.2)
SODIUM SERPL-SCNC: 140 MMOL/L (ref 133–144)
TSH SERPL DL<=0.005 MIU/L-ACNC: 0.6 MU/L (ref 0.4–4)
WBC # BLD AUTO: 5.3 10E9/L (ref 4–11)

## 2018-06-26 PROCEDURE — G0463 HOSPITAL OUTPT CLINIC VISIT: HCPCS

## 2018-06-26 PROCEDURE — 83036 HEMOGLOBIN GLYCOSYLATED A1C: CPT | Mod: ZL | Performed by: FAMILY MEDICINE

## 2018-06-26 PROCEDURE — 36415 COLL VENOUS BLD VENIPUNCTURE: CPT | Mod: ZL | Performed by: FAMILY MEDICINE

## 2018-06-26 PROCEDURE — 80053 COMPREHEN METABOLIC PANEL: CPT | Mod: ZL | Performed by: FAMILY MEDICINE

## 2018-06-26 PROCEDURE — 99214 OFFICE O/P EST MOD 30 MIN: CPT | Performed by: FAMILY MEDICINE

## 2018-06-26 PROCEDURE — 85025 COMPLETE CBC W/AUTO DIFF WBC: CPT | Mod: ZL | Performed by: FAMILY MEDICINE

## 2018-06-26 PROCEDURE — 40000788 ZZHCL STATISTIC ESTIMATED AVERAGE GLUCOSE: Mod: ZL | Performed by: FAMILY MEDICINE

## 2018-06-26 PROCEDURE — 84443 ASSAY THYROID STIM HORMONE: CPT | Mod: ZL | Performed by: FAMILY MEDICINE

## 2018-06-26 RX ORDER — ATORVASTATIN CALCIUM 40 MG/1
40 TABLET, FILM COATED ORAL DAILY
Qty: 90 TABLET | Refills: 3 | Status: SHIPPED | OUTPATIENT
Start: 2018-06-26 | End: 2019-07-26

## 2018-06-26 RX ORDER — OMEPRAZOLE 40 MG/1
40 CAPSULE, DELAYED RELEASE ORAL DAILY
Qty: 30 CAPSULE | Refills: 1 | Status: SHIPPED | OUTPATIENT
Start: 2018-06-26 | End: 2019-02-08

## 2018-06-26 RX ORDER — LOSARTAN POTASSIUM 50 MG/1
75 TABLET ORAL DAILY
Qty: 135 TABLET | Refills: 3 | Status: SHIPPED | OUTPATIENT
Start: 2018-06-26 | End: 2020-07-10

## 2018-06-26 RX ORDER — CLONIDINE HYDROCHLORIDE 0.1 MG/1
TABLET ORAL
Qty: 90 TABLET | Refills: 3 | Status: SHIPPED | OUTPATIENT
Start: 2018-06-26 | End: 2021-04-09

## 2018-06-26 RX ORDER — HYDROCHLOROTHIAZIDE 12.5 MG/1
12.5 TABLET ORAL DAILY
Qty: 90 TABLET | Refills: 3 | Status: SHIPPED | OUTPATIENT
Start: 2018-06-26 | End: 2019-07-26

## 2018-06-26 ASSESSMENT — ANXIETY QUESTIONNAIRES
4. TROUBLE RELAXING: SEVERAL DAYS
7. FEELING AFRAID AS IF SOMETHING AWFUL MIGHT HAPPEN: NOT AT ALL
1. FEELING NERVOUS, ANXIOUS, OR ON EDGE: NOT AT ALL
3. WORRYING TOO MUCH ABOUT DIFFERENT THINGS: SEVERAL DAYS
2. NOT BEING ABLE TO STOP OR CONTROL WORRYING: NOT AT ALL
IF YOU CHECKED OFF ANY PROBLEMS ON THIS QUESTIONNAIRE, HOW DIFFICULT HAVE THESE PROBLEMS MADE IT FOR YOU TO DO YOUR WORK, TAKE CARE OF THINGS AT HOME, OR GET ALONG WITH OTHER PEOPLE: NOT DIFFICULT AT ALL
5. BEING SO RESTLESS THAT IT IS HARD TO SIT STILL: NOT AT ALL
6. BECOMING EASILY ANNOYED OR IRRITABLE: SEVERAL DAYS
GAD7 TOTAL SCORE: 3

## 2018-06-26 ASSESSMENT — PAIN SCALES - GENERAL: PAINLEVEL: NO PAIN (0)

## 2018-06-26 NOTE — NURSING NOTE
"Chief Complaint   Patient presents with     Hypertension     Diabetes       Initial /68 (BP Location: Right arm, Patient Position: Sitting, Cuff Size: Adult Regular)  Pulse 94  Temp 98.6  F (37  C) (Tympanic)  Resp 16  Ht 5' 4\" (1.626 m)  Wt 135 lb (61.2 kg)  SpO2 98%  BMI 23.17 kg/m2 Estimated body mass index is 23.17 kg/(m^2) as calculated from the following:    Height as of this encounter: 5' 4\" (1.626 m).    Weight as of this encounter: 135 lb (61.2 kg).  Medication Reconciliation: complete    Patricia aVrma LPN    "

## 2018-06-26 NOTE — MR AVS SNAPSHOT
"              After Visit Summary   6/26/2018    Katherine Royal    MRN: [id number]           Patient Information     Date Of Birth          1965        Visit Information        Provider Department      6/26/2018 4:00 PM Delbert Rosas MD [facility]        Today's Diagnoses     HTN (hypertension)    -  1    Type 2 diabetes mellitus without complication, unspecified long term insulin use status (H)        Gastroesophageal reflux disease, esophagitis presence not specified        Type 2 diabetes mellitus without complication, non insulin requiring (H)        HTN (hypertension)        Mixed hyperlipidemia          Care Instructions    Continue same medications.            Follow-ups after your visit        Follow-up notes from your care team     Return in about 6 months (around 12/26/2018) for Follow up visit.      Who to contact     If you have questions or need follow up information about today's clinic visit or your schedule please contact [facility] directly at [phone].  Normal or non-critical lab and imaging results will be communicated to you by MyChart, letter or phone within 4 business days after the clinic has received the results. If you do not hear from us within 7 days, please contact the clinic through [employer] or phone. If you have a critical or abnormal lab result, we will notify you by phone as soon as possible.  Submit refill requests through [employer] or call your pharmacy and they will forward the refill request to us. Please allow 3 business days for your refill to be completed.          Additional Information About Your Visit        MyChart Information     [employer] lets you send messages to your doctor, view your test results, renew your prescriptions, schedule appointments and more. To sign up, go to [email or web address] . Click on \"Log in\" on the left side of the screen, which will take you to the Welcome page. Then click on \"Sign up Now\" on the right side " "of the page.     You will be asked to enter the access code listed below, as well as some personal information. Please follow the directions to create your username and password.     Your access code is: [email or web address]  Expires: 2018  3:42 PM     Your access code will  in 90 days. If you need help or a new code, please call your [place] clinic or [phone].        Care EveryWhere ID     This is your Care EveryWhere ID. This could be used by other organizations to access your [facility] medical records  [id number]        Your Vitals Were     Pulse Temperature Respirations Height Pulse Oximetry BMI (Body Mass Index)    94 98.6  F (37  C) (Tympanic) 16 5' 4\" (1.626 m) 98% 23.17 kg/m2       Blood Pressure from Last 3 Encounters:   18 100/68   18 112/78   18 110/60    Weight from Last 3 Encounters:   18 135 lb (61.2 kg)   18 143 lb (64.9 kg)   18 135 lb (61.2 kg)              We Performed the Following     CBC with platelets differential     Comprehensive metabolic panel     Estimated Average Glucose     Hemoglobin A1c     TSH with free T4 reflex          Today's Medication Changes          These changes are accurate as of 18 11:59 PM.  If you have any questions, ask your nurse or doctor.               These medicines have changed or have updated prescriptions.        Dose/Directions    aspirin 81 MG EC tablet   Commonly known as:  EQ ASPIRIN ADULT LOW DOSE   This may have changed:  See the new instructions.   Used for:  Type 2 diabetes mellitus without complication, unspecified long term insulin use status (H)   Changed by:  Delbert Rosas MD        Dose:  81 mg   Take 1 tablet (81 mg) by mouth daily   Quantity:  240 tablet   Refills:  0            Where to get your medicines      These medications were sent to [facility] [address] [place] [id number]     Phone:  [phone]     aspirin 81 MG " EC tablet    atorvastatin 40 MG tablet    cloNIDine 0.1 MG tablet    hydrochlorothiazide 12.5 MG Tabs tablet    losartan 50 MG tablet    metFORMIN 1000 MG tablet    metFORMIN 500 MG tablet    omeprazole 40 MG capsule                Primary Care Provider Office Phone # Fax #    Delbert Rosas [phone] [address] [facility] [address]        Equal Access to Services     [facility]: Hadii aad ku hadasho Soomaali, waaxda luqadaha, qaybta kaalmada adeegyada, waxay idiin hayaan adeeg arielcuate laSarankathia . So [facility] [phone].    ATENCIÓN: Si habla español, tiene a henson disposición servicios gratuitos de asistencia lingüística. Gloria al [phone].    We comply with applicable federal civil rights laws and [place] laws. We do not discriminate on the basis of race, color, national origin, age, disability, sex, sexual orientation, or gender identity.            Thank you!     Thank you for choosing [facility]  for your care. Our goal is always to provide you with excellent care. Hearing back from our patients is one way we can continue to improve our services. Please take a few minutes to complete the written survey that you may receive in the mail after your visit with us. Thank you!             Your Updated Medication List - Protect others around you: Learn how to safely use, store and throw away your medicines at www.disposemymeds.org.          This list is accurate as of 6/26/18 11:59 PM.  Always use your most recent med list.                   Brand Name Dispense Instructions for use Diagnosis    aspirin 81 MG EC tablet    EQ ASPIRIN ADULT LOW DOSE    240 tablet    Take 1 tablet (81 mg) by mouth daily    Type 2 diabetes mellitus without complication, unspecified long term insulin use status (H)       atorvastatin 40 MG tablet    LIPITOR    90 tablet    Take 1 tablet (40 mg) by mouth daily    Mixed hyperlipidemia       * blood glucose lancing device           * blood  glucose lancets standard    no brand specified    100 each    Use to test blood sugar 2 times daily or as directed.    Type 2 diabetes mellitus without complication, without long-term current use of insulin (H)       blood glucose monitoring test strip    no brand specified    100 strip    Check blood glucose twice daily    Type 2 diabetes mellitus without complication, without long-term current use of insulin (H)       cloNIDine 0.1 MG tablet    CATAPRES    90 tablet    TAKE ONE TABLET BY MOUTH AT BEDTIME FOR  ELEVATED  BLOOD  PRESSURE.    Benign essential hypertension       hydrochlorothiazide 12.5 MG Tabs tablet     90 tablet    Take 1 tablet (12.5 mg) by mouth daily    Benign essential hypertension       losartan 50 MG tablet    COZAAR    135 tablet    Take 1.5 tablets (75 mg) by mouth daily    Benign essential hypertension       * metFORMIN 500 MG tablet    GLUCOPHAGE    180 tablet    Take 2 tablets (1,000 mg) by mouth 2 times daily (with meals)    Type 2 diabetes mellitus without complication, without long-term current use of insulin (H)       * metFORMIN 1000 MG tablet    GLUCOPHAGE    180 tablet    TAKE 1 TABLET BY MOUTH TWICE DAILY WITH MEALS    Type 2 diabetes mellitus without complication, unspecified long term insulin use status (H)       mupirocin 2 % cream    BACTROBAN    30 g    Apply topically 3 times daily    Postoperative state       mupirocin 2 % ointment    BACTROBAN    22 g    APPLY  OINTMENT TOPICALLY TO AFFECTED AREA THREE TIMES DAILY    Postoperative state       omeprazole 40 MG capsule    priLOSEC    30 capsule    Take 1 capsule (40 mg) by mouth daily Take 30-60 minutes before a meal.    Gastroesophageal reflux disease, esophagitis presence not specified       vitamin B complex with vitamin C Tabs tablet     100 tablet    Take 1 tablet by mouth daily    Type 2 diabetes mellitus without complication, without long-term current use of insulin (H)       * Notice:  This list has 4 medication(s)  that are the same as other medications prescribed for you. Read the directions carefully, and ask your doctor or other care provider to review them with you.

## 2018-06-26 NOTE — LETTER
June 28, 2018      Katherine Royal  [address]  [place] [address]        Dear ,    We are writing to inform you of your test results.    Copy for your records as requested.    Resulted Orders   CBC with platelets differential   Result Value Ref Range    WBC 5.3 4.0 - 11.0 10e9/L    RBC Count 4.60 3.8 - 5.2 10e12/L    Hemoglobin 13.8 11.7 - 15.7 g/dL    Hematocrit 40.4 35.0 - 47.0 %    MCV 88 78 - 100 fl    MCH 30.0 26.5 - 33.0 pg    MCHC 34.2 31.5 - 36.5 g/dL    RDW 12.9 10.0 - 15.0 %    Platelet Count 250 150 - 450 10e9/L    Diff Method Automated Method     % Neutrophils 51.1 %    % Lymphocytes 37.1 %    % Monocytes 7.9 %    % Eosinophils 2.6 %    % Basophils 0.9 %    % Immature Granulocytes 0.4 %    Nucleated RBCs 0 0 /100    Absolute Neutrophil 2.7 1.6 - 8.3 10e9/L    Absolute Lymphocytes 2.0 0.8 - 5.3 10e9/L    Absolute Monocytes 0.4 0.0 - 1.3 10e9/L    Absolute Eosinophils 0.1 0.0 - 0.7 10e9/L    Absolute Basophils 0.1 0.0 - 0.2 10e9/L    Abs Immature Granulocytes 0.0 0 - 0.4 10e9/L    Absolute Nucleated RBC 0.0    Comprehensive metabolic panel   Result Value Ref Range    Sodium 140 133 - 144 mmol/L    Potassium 4.0 3.4 - 5.3 mmol/L    Chloride 106 94 - 109 mmol/L    Carbon Dioxide 25 20 - 32 mmol/L    Anion Gap 9 3 - 14 mmol/L    Glucose 123 (H) 70 - 99 mg/dL    Urea Nitrogen 15 7 - 30 mg/dL    Creatinine 0.75 0.52 - 1.04 mg/dL    GFR Estimate 81 >60 mL/min/1.7m2      Comment:      Non  GFR Calc    GFR Estimate If Black >90 >60 mL/min/1.7m2      Comment:       GFR Calc    Calcium 9.9 8.5 - 10.1 mg/dL    Bilirubin Total 0.5 0.2 - 1.3 mg/dL    Albumin 4.3 3.4 - 5.0 g/dL    Protein Total 7.8 6.8 - 8.8 g/dL    Alkaline Phosphatase 96 40 - 150 U/L    ALT 30 0 - 50 U/L    AST 21 0 - 45 U/L   TSH with free T4 reflex   Result Value Ref Range    TSH 0.60 0.40 - 4.00 mU/L   Hemoglobin A1c   Result Value Ref Range    Hemoglobin A1C 6.3 (H) 0 - 5.6 %      Comment:       Normal <5.7% Prediabetes 5.7-6.4%  Diabetes 6.5% or higher - adopted from ADA   consensus guidelines.     Estimated Average Glucose   Result Value Ref Range    Estimated Average Glucose 134 mg/dL       If you have any questions or concerns, please call the clinic at the number listed above.       Sincerely,        Delbert Rosas MD

## 2018-06-26 NOTE — PROGRESS NOTES
SUBJECTIVE:   Katherine Royal is a 52 year old female who presents to clinic today for the following health issues:    Diabetes Follow-up    Patient is checking blood sugars: twice daily.    Blood sugar testing frequency justification: Risk of hypoglycemia with medication(s)  Results are as follows:         am - 140's         lunchtime - 140's    Diabetic concerns: None     Symptoms of hypoglycemia (low blood sugar): shaky, dizzy, weak     Paresthesias (numbness or burning in feet) or sores: No     Date of last diabetic eye exam: prior    BP Readings from Last 2 Encounters:   06/26/18 100/68   04/06/18 112/78     Hemoglobin A1C (%)   Date Value   03/09/2018 6.5 (H)   10/27/2017 6.4 (H)     LDL Cholesterol Calculated (mg/dL)   Date Value   02/26/2018 95   03/14/2017 68       Diabetes Management Resources    Amount of exercise or physical activity: 2-3 days/week for an average of 15-30 minutes    Problems taking medications regularly: No    Medication side effects: none    Diet: diabetic        Hypertension Follow-up      Outpatient blood pressures are being checked at home.  Results are normal.    Low Salt Diet: no added salt      Amount of exercise or physical activity: 2-3 days/week for an average of 15-30 minutes    Problems taking medications regularly: No    Medication side effects: none    Diet: diabetic    Problem list and histories reviewed & adjusted, as indicated.  Additional history: as documented    Patient Active Problem List   Diagnosis     Type 2 diabetes mellitus without complication, non insulin requiring (H)     HTN (hypertension)     Perimenopausal     Kidney stone on left side     Primary insomnia     Past Surgical History:   Procedure Laterality Date     APPENDECTOMY       LAPAROSCOPY DIAGNOSTIC (GYN) N/A 5/10/2017    Procedure: LAPAROSCOPY DIAGNOSTIC (GYN);  DIAGNOSTIC LAPAROSCOPY, WITH LEFT SALPINGO-OOPHORECTOMY,FULGERATION OF ENDOMETRIOSIS, LYSIS OF ADHESIONS;  Surgeon: Misael aVnce MD;   Location: [facility]       Social History   Substance Use Topics     Smoking status: Never Smoker     Smokeless tobacco: Never Used     Alcohol use No     Family History   Problem Relation Age of Onset     Diabetes Mother      Hypertension Mother      Diabetes Father          Current Outpatient Prescriptions   Medication Sig Dispense Refill     aspirin (EQ ASPIRIN ADULT LOW DOSE) 81 MG EC tablet Take 1 tablet (81 mg) by mouth daily 240 tablet 0     atorvastatin (LIPITOR) 40 MG tablet Take 1 tablet (40 mg) by mouth daily 90 tablet 3     cloNIDine (CATAPRES) 0.1 MG tablet TAKE ONE TABLET BY MOUTH AT BEDTIME FOR  ELEVATED  BLOOD  PRESSURE. 90 tablet 3     hydrochlorothiazide 12.5 MG TABS tablet Take 1 tablet (12.5 mg) by mouth daily 90 tablet 3     losartan (COZAAR) 50 MG tablet Take 1.5 tablets (75 mg) by mouth daily 135 tablet 3     metFORMIN (GLUCOPHAGE) 1000 MG tablet TAKE 1 TABLET BY MOUTH TWICE DAILY WITH MEALS 180 tablet 3     metFORMIN (GLUCOPHAGE) 500 MG tablet Take 2 tablets (1,000 mg) by mouth 2 times daily (with meals) 180 tablet 3     omeprazole (PRILOSEC) 40 MG capsule Take 1 capsule (40 mg) by mouth daily Take 30-60 minutes before a meal. 30 capsule 1     blood glucose (ACCU-CHEK FASTCLIX) lancing device        blood glucose (NO BRAND SPECIFIED) lancets standard Use to test blood sugar 2 times daily or as directed. 100 each 11     blood glucose monitoring (NO BRAND SPECIFIED) test strip Check blood glucose twice daily 100 strip 3     mupirocin (BACTROBAN) 2 % cream Apply topically 3 times daily 30 g 0     mupirocin (BACTROBAN) 2 % ointment APPLY  OINTMENT TOPICALLY TO AFFECTED AREA THREE TIMES DAILY 22 g 0     vitamin B complex with vitamin C (VITAMIN  B COMPLEX) TABS tablet Take 1 tablet by mouth daily 100 tablet 3     No Known Allergies    Reviewed and updated as needed this visit by clinical staff  Tobacco  Allergies  Meds  Problems  Med Hx  Surg Hx  Fam Hx  Soc Hx        Reviewed and updated as  "needed this visit by Provider         ROS:  Constitutional, HEENT, cardiovascular, pulmonary, gi and gu systems are negative, except as otherwise noted.    OBJECTIVE:     /68 (BP Location: Right arm, Patient Position: Sitting, Cuff Size: Adult Regular)  Pulse 94  Temp 98.6  F (37  C) (Tympanic)  Resp 16  Ht 5' 4\" (1.626 m)  Wt 135 lb (61.2 kg)  SpO2 98%  BMI 23.17 kg/m2  Body mass index is 23.17 kg/(m^2).  Physical Exam   Constitutional: She is oriented to person, place, and time. She appears well-developed and well-nourished. No distress.   Neurological: She is alert and oriented to person, place, and time.   Psychiatric: She has a normal mood and affect.       Other exam not repeated.  Diagnostic Test Results:  none     ASSESSMENT/PLAN:     Type 2 diabetes mellitus without complication, unspecified long term insulin use status (H)  Fasting labs are reviewed.  Goal of hemoglobin A1C of less than 7 discussed.  Instructed in the importance of diet, exercise, and good glycemic control in prevention of secondary complications.    Continue same medication regimen. Repeat fasting glucose and hemoglobin A1C in 6 months.   - aspirin (EQ ASPIRIN ADULT LOW DOSE) 81 MG EC tablet; Take 1 tablet (81 mg) by mouth daily  - metFORMIN (GLUCOPHAGE) 1000 MG tablet; TAKE 1 TABLET BY MOUTH TWICE DAILY WITH MEALS  - CBC with platelets differential  - Comprehensive metabolic panel  - TSH with free T4 reflex  - Hemoglobin A1c    HTN (hypertension)  Goals reviewed.  Continue home BP monitoring and same medication regimen.  Follow up 6 months.   - losartan (COZAAR) 50 MG tablet; Take 1.5 tablets (75 mg) by mouth daily  - hydrochlorothiazide 12.5 MG TABS tablet; Take 1 tablet (12.5 mg) by mouth daily  - cloNIDine (CATAPRES) 0.1 MG tablet; TAKE ONE TABLET BY MOUTH AT BEDTIME FOR  ELEVATED  BLOOD  PRESSURE.  - Estimated Average Glucose      Dysipidemia  Fasting labs reviewed.  Goals discussed.  Discussed the importance of diet, " exercise, and weight reduction.  Follow up 12 months.   - atorvastatin (LIPITOR) 40 MG tablet; Take 1 tablet (40 mg) by mouth daily            Delbert Rosas MD  [facility]

## 2018-06-27 ENCOUNTER — TELEPHONE (OUTPATIENT)
Dept: FAMILY MEDICINE | Facility: OTHER | Age: 53
End: 2018-06-27

## 2018-06-27 ASSESSMENT — ANXIETY QUESTIONNAIRES: GAD7 TOTAL SCORE: 3

## 2018-06-27 ASSESSMENT — PATIENT HEALTH QUESTIONNAIRE - PHQ9: SUM OF ALL RESPONSES TO PHQ QUESTIONS 1-9: 3

## 2018-06-27 NOTE — TELEPHONE ENCOUNTER
Reason for call:  RESULTS    1. What is the test that was ordered? Labs  2. Who ordered your test? Dr. Rosas  3. When was the test performed?  Yesterday 06-26-18  Description: Pt would like call back with results. She is also requesting copies be mailed to her as well.  Was an appointment offered for this a call? No  If Yes :  Appointment type                 Date    Preferred method for responding to this message: Telephone Call  What is your phone number ? [phone]    If we cannot reach you directly, may we leave a detailed response at the number you provided? Yes  Can this message wait until your PCP/Provider returns if not available today? N/A

## 2018-07-23 NOTE — PROGRESS NOTES
Patient Information     Patient Name  Katherine Royal MRN  [id number] Sex  Female   1965      Letter by Didier Sweeney MD at      Author:  Didier Sweeney MD Service:  (none) Author Type:  (none)    Filed:   Encounter Date:  2017 Status:  (Other)           Delbert Rosas MD  [address] [address]          2017    Re:  Katherine Royal       : 1965  [id number] [facility] [address]  [facility] [address]    Appointment Date: 17      Dear  Dr Rosas,    Thank you for allowing me to take part in this patient s care.  I copied my note below from today's clinic visit for your records.  Please feel free to contact me with any questions      Warm regards,            Didier Sweeney MD, PharmD, [facility]  Urologist  [address] [place] [id number]  Appointments: [phone]                    I was asked to see this patient by Dr Rosas and provide my opinion about the following:   Kidney stone    Type of Visit  Consult    Chief Complaint   Kidney stone    HPI  Ms. Royal is a 52 y.o. female who presents with left kidney stone.  The patient initially presented to clinic with intermittent left flank pain  At that time the patient underwent a CT scan which revealed a 7 mm nonobstructing stone.  The patient currently denies fevers or chills.  The patient currently denies nausea or vomiting.  She has not undergone surgery in the past for stones.      Past Medical History  She  has a past medical history of Diabetes type 2, controlled ([facility]); Hypertension (3/9/2016); and Renal lithiasis (2015).  Patient Active Problem List     Diagnosis  Code     Hypertension I10     Prediabetes R73.03     Renal lithiasis N20.0     Medications  She has a current medication list which includes the following prescription(s): accu-chek fastclix, accu-chek smartview test strip, aspirin, atorvastatin, clonidine hcl, gemfibrozil, hydrochlorothiazide, hydrocodone-acetaminophen  "(5-325 mg), ibuprofen, leuprolide acetate, losartan, metformin, mupirocin, and paroxetine.    Allergies  No Known Allergies    Social History  She  reports that she has never smoked. She has never used smokeless tobacco.  No drug abuse.    Family History  No family history of urologic cancers    Review of Systems  I personally reviewed the ROS with the patient.    Nursing Notes:   Che Norton  9/6/2017 11:44 AM  Signed  Patient presents to the clinic for kidney stone consult.  Che Norton LPN........................9/6/2017  11:03 AM    Review of Systems:    Weight loss:    No     Recent fever/chills:  No   Night sweats:   yes  Current skin rash:  No   Recent hair loss:  No  Heat intolerance:  No   Cold intolerance:  No  Chest pain:   No   Palpitations:   No  Shortness of breath:  No   Wheezing:   No  Constipation:    No   Diarrhea:   No   Nausea:   No   Vomiting:   No   Kidney/side pain:  yes   Back pain:   yes  Frequent headaches:  No   Dizziness:     yes  Leg swelling:   No   Calf pain:    yes    Parents, brothers or sisters with history of kidney cancer?   No  Parents, brothers or sisters with history of bladder cancer: No      Physical Exam  Vitals:     09/06/17 1106   BP: 122/62   Pulse: 68   Weight: 62.1 kg (137 lb)   Height: 1.626 m (5' 4\")     Constitutional: NAD, WDWN  Head: NCAT  Eyes: Conjunctivae normal  Cardiovascular: Regular rate  Pulmonary/Chest: Respirations are even and non-labored bilaterally  Abdominal: Soft with no distension, tenderness, masses, guarding.    - left CVA tenderness    - right CVA tenderness  Neurological: A + O x 3,  cranial nerves II-XII grossly intact  Extremities: NOLA x 4, warm, no clubbing, no cyanosis  Skin: Pink, warm, dry with no rash  Psychiatric:  Normal mood and affect  Genitourinary: Nonpalpable bladder    Imaging  CT stone study  3/22/2017  I personally reviewed and interpreted the images and report.  7 mm left kidney stone    Assessment  Ms. Royal is a 52 " y.o. female who presents with left kidney stone and intermittent left flank pain.  Given the stone size we discussed intervention as it is unlikely she would successfully pass the left stone.    Discussed the treatment options for the Left stone including observation, ESWL and ureteroscopy with laser lithotripsy.      After explaining the risks, benefits, and alternatives a decision was made to proceed with ureteroscopy/laser lithotripsy.    The patient was explained the specific risks of bleeding, pain, infection, and ureteral injury.    In addition, the patient was told a stent may need to be placed which could result in urinary frequency, urgency, dysuria, and flank pain with voiding.    It would require an office based procedure to remove it at a later date.    Finally, the patient was told if the stone was very impacted, that we would place a stent and return at a later date to treat the stone.      Plan  The patient was scheduled and consented for Left ureteroscopy with laser lithotripsy, ureteral stent placement in the OR.   - Pre-op through PCP for perioperative management of hypertension and  diabetes.

## 2018-09-10 ENCOUNTER — TELEPHONE (OUTPATIENT)
Dept: FAMILY MEDICINE | Facility: OTHER | Age: 53
End: 2018-09-10

## 2018-09-10 NOTE — TELEPHONE ENCOUNTER
1:34 PM    Reason for Call: Phone Call    Description: Pt called and stated she would to get her A1C checked at [facility] in [place]. She would like to do this on 09/20/18. They need lab orders sent to them. Please call her back at [phone]    Was an appointment offered for this call? No  If yes : Appointment type              Date    Preferred method for responding to this message: Telephone Call  What is your phone number ?    If we cannot reach you directly, may we leave a detailed response at the number you provided? Yes    Can this message wait until your PCP/provider returns, if available today? YES, aware provider out for rest of today    Digna Regan

## 2018-09-11 ENCOUNTER — TELEPHONE (OUTPATIENT)
Dept: FAMILY MEDICINE | Facility: OTHER | Age: 53
End: 2018-09-11

## 2018-09-12 ENCOUNTER — TELEPHONE (OUTPATIENT)
Dept: FAMILY MEDICINE | Facility: OTHER | Age: 53
End: 2018-09-12

## 2018-09-12 DIAGNOSIS — E11.9 TYPE 2 DIABETES MELLITUS WITHOUT COMPLICATION, WITHOUT LONG-TERM CURRENT USE OF INSULIN (H): Primary | ICD-10-CM

## 2018-09-12 NOTE — TELEPHONE ENCOUNTER
11:46 AM    Reason for Call: Phone Call    Description: pt would like to have her A1C done at [facility] in [place] on Sept 21,2018    Was an appointment offered for this call? No  If yes : Appointment type              Date    Preferred method for responding to this message: telephone  What is your phone number ?[phone]    If we cannot reach you directly, may we leave a detailed response at the number you provided? No    Can this message wait until your PCP/provider returns, if available today? Yanira Henderson

## 2018-09-17 ENCOUNTER — TELEPHONE (OUTPATIENT)
Dept: FAMILY MEDICINE | Facility: OTHER | Age: 53
End: 2018-09-17

## 2018-09-25 ENCOUNTER — RESULTS ONLY (OUTPATIENT)
Dept: LAB | Age: 53
End: 2018-09-25

## 2018-09-25 LAB — HBA1C MFR BLD: 6.3 % (ref 4–6)

## 2018-11-30 ENCOUNTER — TRANSFERRED RECORDS (OUTPATIENT)
Dept: HEALTH INFORMATION MANAGEMENT | Facility: CLINIC | Age: 53
End: 2018-11-30

## 2019-02-05 NOTE — PROGRESS NOTES
SUBJECTIVE:   Katherine Royal is a 53 year old female who presents to clinic today for the following health issues:        Diabetes Follow-up    Patient is checking blood sugars: once daily.  Results are as follows:         am -     Diabetic concerns: None     Symptoms of hypoglycemia (low blood sugar): shaky, cold hands     Paresthesias (numbness or burning in feet) or sores: No     Date of last diabetic eye exam: spring 2018    BP Readings from Last 2 Encounters:   06/26/18 100/68   04/06/18 112/78     Hemoglobin A1C (%)   Date Value   09/25/2018 6.3 (H)   06/26/2018 6.3 (H)     LDL Cholesterol Calculated (mg/dL)   Date Value   02/26/2018 95   03/14/2017 68       Diabetes Management Resources  Hypertension Follow-up      Outpatient blood pressures are being checked at home.  Results are 120/85.    Low Salt Diet: low salt      Amount of exercise or physical activity: 1 day/week for an average of 45-60 minutes    Problems taking medications regularly: No    Medication side effects: none    Diet: regular (no restrictions)        RESPIRATORY SYMPTOMS      Duration: 3 weeks    Description  nasal congestion, sore throat and facial pain/pressure    Severity: moderate    Accompanying signs and symptoms: None    History (predisposing factors):  none    Precipitating or alleviating factors: None    Therapies tried and outcome:  She has seen dentist and exam negative.         Problem list and histories reviewed & adjusted, as indicated.  Additional history: as documented    Patient Active Problem List   Diagnosis     Type 2 diabetes mellitus without complication, non insulin requiring (H)     HTN (hypertension)     Perimenopausal     Kidney stone on left side     Primary insomnia     Past Surgical History:   Procedure Laterality Date     APPENDECTOMY       LAPAROSCOPY DIAGNOSTIC (GYN) N/A 5/10/2017    Procedure: LAPAROSCOPY DIAGNOSTIC (GYN);  DIAGNOSTIC LAPAROSCOPY, WITH LEFT SALPINGO-OOPHORECTOMY,FULGERATION OF  ENDOMETRIOSIS, LYSIS OF ADHESIONS;  Surgeon: Misael Vance MD;  Location: [facility] OR       Social History     Tobacco Use     Smoking status: Never Smoker     Smokeless tobacco: Never Used   Substance Use Topics     Alcohol use: No     Family History   Problem Relation Age of Onset     Diabetes Mother      Hypertension Mother      Diabetes Father          Current Outpatient Medications   Medication Sig Dispense Refill     atorvastatin (LIPITOR) 40 MG tablet Take 1 tablet (40 mg) by mouth daily 90 tablet 3     blood glucose (ACCU-CHEK FASTCLIX) lancing device        blood glucose (NO BRAND SPECIFIED) lancets standard Use to test blood sugar 2 times daily or as directed. 100 each 11     blood glucose monitoring (NO BRAND SPECIFIED) test strip Check blood glucose twice daily 100 strip 3     clindamycin (CLEOCIN) 300 MG capsule Take 1 capsule (300 mg) by mouth 2 times daily for 10 days 20 capsule 0     cloNIDine (CATAPRES) 0.1 MG tablet TAKE ONE TABLET BY MOUTH AT BEDTIME FOR  ELEVATED  BLOOD  PRESSURE. 90 tablet 3     hydrochlorothiazide 12.5 MG TABS tablet Take 1 tablet (12.5 mg) by mouth daily 90 tablet 3     losartan (COZAAR) 50 MG tablet Take 1.5 tablets (75 mg) by mouth daily 135 tablet 3     mupirocin (BACTROBAN) 2 % cream Apply topically 3 times daily 30 g 0     mupirocin (BACTROBAN) 2 % ointment APPLY  OINTMENT TOPICALLY TO AFFECTED AREA THREE TIMES DAILY 22 g 0     omeprazole (PRILOSEC) 40 MG DR capsule Take 1 capsule (40 mg) by mouth daily Take 30-60 minutes before a meal. 30 capsule 1     vitamin B complex with vitamin C (VITAMIN  B COMPLEX) TABS tablet Take 1 tablet by mouth daily 100 tablet 3     aspirin (EQ ASPIRIN ADULT LOW DOSE) 81 MG EC tablet Take 1 tablet (81 mg) by mouth daily 240 tablet 0     metFORMIN (GLUCOPHAGE) 1000 MG tablet TAKE 1 TABLET BY MOUTH TWICE DAILY WITH MEALS 180 tablet 3     No Known Allergies  Recent Labs   Lab Test 02/08/19  1618 09/25/18  1940 06/26/18  1640  02/26/18  0911   "03/14/17   A1C 6.5* 6.3* 6.3*   < >  --    < >  --    LDL  --   --   --   --  95  --  68   HDL  --   --   --   --  75  --  54   TRIG  --   --   --   --  195*  --  350*   ALT  --   --  30  --  50  --  38   CR  --   --  0.75  --  0.56  --  0.6*   GFRESTIMATED  --   --  81  --  >90  --   --    GFRESTBLACK  --   --  >90  --  >90  --   --    POTASSIUM  --   --  4.0  --  4.0  --  4.0   TSH  --   --  0.60  --  1.36  --   --     < > = values in this interval not displayed.      BP Readings from Last 3 Encounters:   02/08/19 120/80   06/26/18 100/68   04/06/18 112/78    Wt Readings from Last 3 Encounters:   02/08/19 62.1 kg (137 lb)   06/26/18 61.2 kg (135 lb)   04/06/18 64.9 kg (143 lb)                    Reviewed and updated as needed this visit by clinical staff       Reviewed and updated as needed this visit by Provider         ROS:  Constitutional, HEENT, cardiovascular, pulmonary, gi and gu systems are negative, except as otherwise noted.    OBJECTIVE:     /80 (BP Location: Right arm, Patient Position: Sitting, Cuff Size: Adult Regular)   Pulse 97   Temp 99.1  F (37.3  C) (Tympanic)   Ht 1.626 m (5' 4\")   Wt 62.1 kg (137 lb)   SpO2 96%   BMI 23.52 kg/m    Body mass index is 23.52 kg/m .  Physical Exam   Constitutional: She is oriented to person, place, and time. She appears well-developed and well-nourished. No distress.   HENT:   Head: Normocephalic.   Right Ear: Hearing, tympanic membrane, external ear and ear canal normal.   Left Ear: Hearing, tympanic membrane, external ear and ear canal normal.   Nose: Right sinus exhibits maxillary sinus tenderness. Right sinus exhibits no frontal sinus tenderness. Left sinus exhibits no maxillary sinus tenderness and no frontal sinus tenderness.   Mouth/Throat: Uvula is midline and oropharynx is clear and moist. No oropharyngeal exudate or posterior oropharyngeal erythema.   Eyes: Conjunctivae are normal.   Neck: Neck supple.   Pulmonary/Chest: Effort normal and " breath sounds normal.   Lymphadenopathy:     She has no cervical adenopathy.   Neurological: She is alert and oriented to person, place, and time.   Skin: Skin is warm and dry.   Psychiatric: She has a normal mood and affect.         Diagnostic Test Results:  Results for orders placed or performed in visit on 02/08/19   Albumin Random Urine Quantitative with Creat Ratio   Result Value Ref Range    Creatinine Urine 98 mg/dL    Albumin Urine mg/L 17 mg/L    Albumin Urine mg/g Cr 16.99 0 - 25 mg/g Cr   Hemoglobin A1c   Result Value Ref Range    Hemoglobin A1C 6.5 (H) 0 - 5.6 %   Estimated Average Glucose   Result Value Ref Range    Estimated Average Glucose 140 mg/dL       ASSESSMENT/PLAN:     Type 2 diabetes mellitus without complication, without long-term current use of insulin (H)  Fasting labs are reviewed.  Goal of hemoglobin A1C of less than 7 discussed.  Instructed in the importance of diet, exercise, and good glycemic control in prevention of secondary complications.    Continue same medication regimen. Repeat fasting glucose and hemoglobin A1C in 6 months.   - Albumin Random Urine Quantitative with Creat Ratio  - Hemoglobin A1c  - Estimated Average Glucose  - Estimated Average Glucose    Gastroesophageal reflux disease, esophagitis presence not specified  Stable.  Continue omeprazole as written  - omeprazole (PRILOSEC) 40 MG DR capsule; Take 1 capsule (40 mg) by mouth daily Take 30-60 minutes before a meal.    Chronic maxillary sinusitis  Clindamycin as written.  Follow up with persistent symptoms.  - clindamycin (CLEOCIN) 300 MG capsule; Take 1 capsule (300 mg) by mouth 2 times daily for 10 days    Delbert Rosas MD  [facility]

## 2019-02-08 ENCOUNTER — OFFICE VISIT (OUTPATIENT)
Dept: FAMILY MEDICINE | Facility: OTHER | Age: 54
End: 2019-02-08
Attending: FAMILY MEDICINE
Payer: COMMERCIAL

## 2019-02-08 VITALS
OXYGEN SATURATION: 96 % | HEIGHT: 64 IN | HEART RATE: 97 BPM | TEMPERATURE: 99.1 F | WEIGHT: 137 LBS | BODY MASS INDEX: 23.39 KG/M2 | DIASTOLIC BLOOD PRESSURE: 80 MMHG | SYSTOLIC BLOOD PRESSURE: 120 MMHG

## 2019-02-08 DIAGNOSIS — J32.0 CHRONIC MAXILLARY SINUSITIS: ICD-10-CM

## 2019-02-08 DIAGNOSIS — E11.9 TYPE 2 DIABETES MELLITUS WITHOUT COMPLICATION, WITHOUT LONG-TERM CURRENT USE OF INSULIN (H): Primary | ICD-10-CM

## 2019-02-08 DIAGNOSIS — K21.9 GASTROESOPHAGEAL REFLUX DISEASE, ESOPHAGITIS PRESENCE NOT SPECIFIED: ICD-10-CM

## 2019-02-08 LAB
CREAT UR-MCNC: 98 MG/DL
EST. AVERAGE GLUCOSE BLD GHB EST-MCNC: 140 MG/DL
HBA1C MFR BLD: 6.5 % (ref 0–5.6)
MICROALBUMIN UR-MCNC: 17 MG/L
MICROALBUMIN/CREAT UR: 16.99 MG/G CR (ref 0–25)

## 2019-02-08 PROCEDURE — 99214 OFFICE O/P EST MOD 30 MIN: CPT | Performed by: FAMILY MEDICINE

## 2019-02-08 PROCEDURE — 82043 UR ALBUMIN QUANTITATIVE: CPT | Performed by: FAMILY MEDICINE

## 2019-02-08 PROCEDURE — 36415 COLL VENOUS BLD VENIPUNCTURE: CPT | Performed by: FAMILY MEDICINE

## 2019-02-08 PROCEDURE — 83036 HEMOGLOBIN GLYCOSYLATED A1C: CPT | Performed by: FAMILY MEDICINE

## 2019-02-08 RX ORDER — CLINDAMYCIN HCL 300 MG
300 CAPSULE ORAL 2 TIMES DAILY
Qty: 20 CAPSULE | Refills: 0 | Status: SHIPPED | OUTPATIENT
Start: 2019-02-08 | End: 2019-04-08

## 2019-02-08 RX ORDER — OMEPRAZOLE 40 MG/1
40 CAPSULE, DELAYED RELEASE ORAL DAILY
Qty: 30 CAPSULE | Refills: 1 | Status: SHIPPED | OUTPATIENT
Start: 2019-02-08 | End: 2020-05-27

## 2019-02-08 ASSESSMENT — PAIN SCALES - GENERAL: PAINLEVEL: MODERATE PAIN (4)

## 2019-02-08 ASSESSMENT — MIFFLIN-ST. JEOR: SCORE: 1211.43

## 2019-02-08 NOTE — NURSING NOTE
"Chief Complaint   Patient presents with     Diabetes       Initial /80 (BP Location: Right arm, Patient Position: Sitting, Cuff Size: Adult Regular)   Pulse 97   Temp 99.1  F (37.3  C) (Tympanic)   Ht 1.626 m (5' 4\")   Wt 62.1 kg (137 lb)   SpO2 96%   BMI 23.52 kg/m   Estimated body mass index is 23.52 kg/m  as calculated from the following:    Height as of this encounter: 1.626 m (5' 4\").    Weight as of this encounter: 62.1 kg (137 lb).  Medication Reconciliation: complete    Allison Dennison LPN  "

## 2019-02-19 ENCOUNTER — TRANSFERRED RECORDS (OUTPATIENT)
Dept: HEALTH INFORMATION MANAGEMENT | Facility: CLINIC | Age: 54
End: 2019-02-19

## 2019-02-19 ENCOUNTER — TELEPHONE (OUTPATIENT)
Dept: FAMILY MEDICINE | Facility: OTHER | Age: 54
End: 2019-02-19

## 2019-02-19 DIAGNOSIS — M79.2 NEURALGIA: Primary | ICD-10-CM

## 2019-02-19 DIAGNOSIS — J32.0 CHRONIC MAXILLARY SINUSITIS: ICD-10-CM

## 2019-02-20 NOTE — TELEPHONE ENCOUNTER
Patient states that she did see a dentist and she has neuralgia to the right side of her face.  She is requesting a referral to ENT.  Thank you.

## 2019-02-21 NOTE — TELEPHONE ENCOUNTER
2:09 PM    Reason for Call: Phone Call    Description: Patient called and states having a good amount of pain in teeth, on the right side of face and into ear. Patient was notified that referral has been placed. patient states clindamycin medi ation that previously placed on did not work and that she is looking for something to help with the pain. Allergies verified with what is on file. Please advise.  Katherine Burnett CMA     Was an appointment offered for this call? No  If yes : Appointment type              Date    Preferred method for responding to this message: Telephone Call  What is your phone number ?    If we cannot reach you directly, may we leave a detailed response at the number you provided? Yes    Can this message wait until your PCP/provider returns, if available today? Not applicable    Katherine Burnett MA

## 2019-02-25 RX ORDER — KETOROLAC TROMETHAMINE 10 MG/1
10 TABLET, FILM COATED ORAL EVERY 6 HOURS PRN
Qty: 20 TABLET | Refills: 0 | OUTPATIENT
Start: 2019-02-25 | End: 2024-08-08

## 2019-02-25 NOTE — TELEPHONE ENCOUNTER
Pended medication.        Delbert Rosas MD   You 3 days ago      Toradol 10 mg every 6 hours #20    Routing Comment

## 2019-03-05 ENCOUNTER — TELEPHONE (OUTPATIENT)
Dept: FAMILY MEDICINE | Facility: OTHER | Age: 54
End: 2019-03-05

## 2019-03-05 DIAGNOSIS — I10 BENIGN ESSENTIAL HYPERTENSION: Primary | ICD-10-CM

## 2019-03-05 NOTE — TELEPHONE ENCOUNTER
1:53 PM    Reason for Call: Phone Call    Description:  called back and stated that the medication being recalled is losartan (COZAAR) 50 MG tablet. Patient and  would like to know what the next step is.     Was an appointment offered for this call? No  If yes : Appointment type              Date    Preferred method for responding to this message: Telephone Call  What is your phone number ?    If we cannot reach you directly, may we leave a detailed response at the number you provided? Yes    Can this message wait until your PCP/provider returns, if available today? Not applicable    Katherine Burnett MA

## 2019-03-05 NOTE — TELEPHONE ENCOUNTER
10:41 AM    Reason for Call: Phone Call    Description: Patient called wondering what do to as her BP meds are on recall.  She uses [facility] in [place] and only wants to talk to PCP's nurse.    Was an appointment offered for this call? No  If yes : Appointment type              Date    Preferred method for responding to this message: Telephone Call  What is your phone number ?    If we cannot reach you directly, may we leave a detailed response at the number you provided? No    Can this message wait until your PCP/provider returns, if available today? YES,     Marisol Temple LPN

## 2019-03-06 RX ORDER — LISINOPRIL 10 MG/1
10 TABLET ORAL DAILY
Qty: 30 TABLET | Refills: 1 | Status: SHIPPED | OUTPATIENT
Start: 2019-03-06 | End: 2019-05-29

## 2019-04-08 DIAGNOSIS — J32.0 CHRONIC MAXILLARY SINUSITIS: ICD-10-CM

## 2019-04-10 NOTE — TELEPHONE ENCOUNTER
Not on protocol.   19    clindamycin (CLEOCIN) 300 MG capsule      Last Written Prescription Date:  19  Last Fill Quantity: 20,   # refills: 0  Last Office Visit: 19  Future Office visit:       Routing refill request to provider for review/approval because:  Drug not on the [facility], P or [facility] refill protocol or controlled substance

## 2019-04-11 RX ORDER — CLINDAMYCIN HCL 300 MG
CAPSULE ORAL
Qty: 20 CAPSULE | Refills: 0 | Status: SHIPPED | OUTPATIENT
Start: 2019-04-11 | End: 2019-10-02

## 2019-05-20 NOTE — TELEPHONE ENCOUNTER
Referral was good for a year and has since .    Siobhan [place]  Patient Representative  [facility]

## 2019-05-27 DIAGNOSIS — I10 BENIGN ESSENTIAL HYPERTENSION: ICD-10-CM

## 2019-05-29 DIAGNOSIS — I10 BENIGN ESSENTIAL HYPERTENSION: ICD-10-CM

## 2019-05-29 RX ORDER — LISINOPRIL 10 MG/1
TABLET ORAL
Qty: 30 TABLET | Refills: 1 | OUTPATIENT
Start: 2019-05-29

## 2019-05-29 RX ORDER — LISINOPRIL 10 MG/1
10 TABLET ORAL DAILY
Qty: 30 TABLET | Refills: 1 | Status: SHIPPED | OUTPATIENT
Start: 2019-05-29 | End: 2019-07-26

## 2019-06-25 ENCOUNTER — APPOINTMENT (OUTPATIENT)
Dept: LAB | Facility: OTHER | Age: 54
End: 2019-06-25
Attending: NURSE PRACTITIONER

## 2019-06-25 ENCOUNTER — RESULTS ONLY (OUTPATIENT)
Dept: LAB | Age: 54
End: 2019-06-25

## 2019-06-25 LAB — HBA1C MFR BLD: 6.3 % (ref 4–6)

## 2019-07-26 DIAGNOSIS — E11.9 TYPE 2 DIABETES MELLITUS WITHOUT COMPLICATION (H): ICD-10-CM

## 2019-07-26 DIAGNOSIS — I10 BENIGN ESSENTIAL HYPERTENSION: ICD-10-CM

## 2019-07-26 DIAGNOSIS — E78.2 MIXED HYPERLIPIDEMIA: ICD-10-CM

## 2019-07-26 RX ORDER — LISINOPRIL 10 MG/1
TABLET ORAL
Qty: 30 TABLET | Refills: 0 | Status: SHIPPED | OUTPATIENT
Start: 2019-07-26 | End: 2019-08-26

## 2019-07-26 RX ORDER — ATORVASTATIN CALCIUM 40 MG/1
TABLET, FILM COATED ORAL
Qty: 30 TABLET | Refills: 0 | Status: SHIPPED | OUTPATIENT
Start: 2019-07-26 | End: 2019-08-26

## 2019-07-26 RX ORDER — HYDROCHLOROTHIAZIDE 12.5 MG/1
TABLET ORAL
Qty: 30 TABLET | Refills: 0 | Status: SHIPPED | OUTPATIENT
Start: 2019-07-26 | End: 2019-08-26

## 2019-08-02 ENCOUNTER — TELEPHONE (OUTPATIENT)
Dept: FAMILY MEDICINE | Facility: OTHER | Age: 54
End: 2019-08-02

## 2019-08-02 DIAGNOSIS — E78.2 MIXED HYPERLIPIDEMIA: Primary | ICD-10-CM

## 2019-08-12 DIAGNOSIS — E78.2 MIXED HYPERLIPIDEMIA: ICD-10-CM

## 2019-08-12 DIAGNOSIS — I10 BENIGN ESSENTIAL HYPERTENSION: ICD-10-CM

## 2019-08-12 LAB
ALBUMIN SERPL-MCNC: 3.8 G/DL (ref 3.4–5)
ALP SERPL-CCNC: 63 U/L (ref 40–150)
ALT SERPL W P-5'-P-CCNC: 36 U/L (ref 0–50)
ANION GAP SERPL CALCULATED.3IONS-SCNC: 5 MMOL/L (ref 3–14)
AST SERPL W P-5'-P-CCNC: 20 U/L (ref 0–45)
BILIRUB SERPL-MCNC: 0.7 MG/DL (ref 0.2–1.3)
BUN SERPL-MCNC: 15 MG/DL (ref 7–30)
CALCIUM SERPL-MCNC: 9.1 MG/DL (ref 8.5–10.1)
CHLORIDE SERPL-SCNC: 110 MMOL/L (ref 94–109)
CHOLEST SERPL-MCNC: 137 MG/DL
CO2 SERPL-SCNC: 27 MMOL/L (ref 20–32)
CREAT SERPL-MCNC: 0.54 MG/DL (ref 0.52–1.04)
GFR SERPL CREATININE-BSD FRML MDRD: >90 ML/MIN/{1.73_M2}
GLUCOSE SERPL-MCNC: 106 MG/DL (ref 70–99)
HDLC SERPL-MCNC: 64 MG/DL
LDLC SERPL CALC-MCNC: 51 MG/DL
NONHDLC SERPL-MCNC: 73 MG/DL
POTASSIUM SERPL-SCNC: 3.8 MMOL/L (ref 3.4–5.3)
PROT SERPL-MCNC: 7 G/DL (ref 6.8–8.8)
SODIUM SERPL-SCNC: 142 MMOL/L (ref 133–144)
TRIGL SERPL-MCNC: 110 MG/DL

## 2019-08-12 PROCEDURE — 36415 COLL VENOUS BLD VENIPUNCTURE: CPT | Performed by: FAMILY MEDICINE

## 2019-08-12 PROCEDURE — 80061 LIPID PANEL: CPT | Performed by: FAMILY MEDICINE

## 2019-08-12 PROCEDURE — 80053 COMPREHEN METABOLIC PANEL: CPT | Performed by: FAMILY MEDICINE

## 2019-08-26 DIAGNOSIS — E11.9 TYPE 2 DIABETES MELLITUS WITHOUT COMPLICATION (H): ICD-10-CM

## 2019-08-26 DIAGNOSIS — I10 BENIGN ESSENTIAL HYPERTENSION: ICD-10-CM

## 2019-08-26 DIAGNOSIS — E78.2 MIXED HYPERLIPIDEMIA: ICD-10-CM

## 2019-08-28 RX ORDER — LISINOPRIL 10 MG/1
TABLET ORAL
Qty: 90 TABLET | Refills: 1 | Status: SHIPPED | OUTPATIENT
Start: 2019-08-28 | End: 2020-08-27

## 2019-08-28 RX ORDER — ATORVASTATIN CALCIUM 40 MG/1
TABLET, FILM COATED ORAL
Qty: 90 TABLET | Refills: 1 | Status: SHIPPED | OUTPATIENT
Start: 2019-08-28 | End: 2020-07-10

## 2019-08-28 RX ORDER — HYDROCHLOROTHIAZIDE 12.5 MG/1
TABLET ORAL
Qty: 90 TABLET | Refills: 1 | Status: SHIPPED | OUTPATIENT
Start: 2019-08-28 | End: 2020-09-30

## 2019-09-04 ENCOUNTER — TELEPHONE (OUTPATIENT)
Dept: FAMILY MEDICINE | Facility: OTHER | Age: 54
End: 2019-09-04

## 2019-09-04 NOTE — TELEPHONE ENCOUNTER
Pt calls for lab results of 8-12-19    Letter has been mailed 2 times, pt not receiving the letter    Address verified    Will re-mail letter    Results given over the phone.    Melly Maynard LPN

## 2019-09-25 DIAGNOSIS — E11.9 TYPE 2 DIABETES MELLITUS WITHOUT COMPLICATION (H): ICD-10-CM

## 2019-10-01 ENCOUNTER — RESULTS ONLY (OUTPATIENT)
Dept: LAB | Age: 54
End: 2019-10-01

## 2019-10-01 ENCOUNTER — HOSPITAL ENCOUNTER (OUTPATIENT)
Facility: OTHER | Age: 54
Setting detail: SPECIMEN
End: 2019-10-01
Attending: NURSE PRACTITIONER

## 2019-10-01 LAB — HBA1C MFR BLD: 6.6 % (ref 4–6)

## 2019-10-02 ENCOUNTER — TELEPHONE (OUTPATIENT)
Dept: FAMILY MEDICINE | Facility: OTHER | Age: 54
End: 2019-10-02

## 2019-10-02 DIAGNOSIS — J32.0 CHRONIC MAXILLARY SINUSITIS: ICD-10-CM

## 2019-10-02 NOTE — TELEPHONE ENCOUNTER
Patient checked A1C at the free clinic in [place] in 6.5   Wanted to let you know.  Also requests a refill of Clindamycin.      Please advise

## 2019-10-08 RX ORDER — CLINDAMYCIN HCL 300 MG
CAPSULE ORAL
Qty: 20 CAPSULE | Refills: 0 | Status: SHIPPED | OUTPATIENT
Start: 2019-10-08 | End: 2019-12-26

## 2019-11-19 DIAGNOSIS — E11.9 TYPE 2 DIABETES MELLITUS WITHOUT COMPLICATION, UNSPECIFIED WHETHER LONG TERM INSULIN USE (H): ICD-10-CM

## 2019-11-19 NOTE — LETTER
November 22, 2019      Katherine Royal  [address] [address]  [place] [address]        Dear Katherine,     APPOINTMENT REMINDER:   Our records indicates that it is time for you to be seen for your diabetic follow-up.      You will need to be seen before any additional refills can be approved. Taking care of your health is important to us, and ongoing visits with your provider are vital to your care.    We look forward to seeing you in the near future.  You may call our office at [phone] to schedule a visit.     Please disregard this notice if you have already made an appointment.        Sincerely,  Dr. Rosas

## 2019-11-21 NOTE — TELEPHONE ENCOUNTER
metFORMIN (GLUCOPHAGE) 1000 MG tablet  Last visit date with prescribing provider: 2-8-2019  Last refill date: 10-  Quantity: 60, Refills: 0    Melly Maynard LPN

## 2019-12-26 DIAGNOSIS — J32.0 CHRONIC MAXILLARY SINUSITIS: ICD-10-CM

## 2019-12-26 RX ORDER — CLINDAMYCIN HCL 300 MG
CAPSULE ORAL
Qty: 20 CAPSULE | Refills: 0 | Status: SHIPPED | OUTPATIENT
Start: 2019-12-26 | End: 2020-12-18

## 2019-12-26 NOTE — TELEPHONE ENCOUNTER
Clindamycin   Last Written Prescription Date: 10/8/2019  Last Fill Quantity: 20,   # refills: 0  Last Office Visit: 2/8/2019  Future Office visit:       Routing refill request to provider for review/approval because:  Drug not on the [facility], P or  Health refill protocol or controlled substance    Patient states I need this for my sore throat.  Still working on getting insurance in place.  Has been working at same employer for 6 months and they have not given her the benefits yet.  Is working with HR to get the medical in place.

## 2020-01-10 ENCOUNTER — OFFICE VISIT (OUTPATIENT)
Dept: FAMILY MEDICINE | Facility: OTHER | Age: 55
End: 2020-01-10
Attending: FAMILY MEDICINE

## 2020-01-10 VITALS
DIASTOLIC BLOOD PRESSURE: 70 MMHG | HEART RATE: 82 BPM | HEIGHT: 64 IN | BODY MASS INDEX: 20.83 KG/M2 | WEIGHT: 122 LBS | RESPIRATION RATE: 18 BRPM | OXYGEN SATURATION: 98 % | TEMPERATURE: 97.6 F | SYSTOLIC BLOOD PRESSURE: 104 MMHG

## 2020-01-10 DIAGNOSIS — K21.9 GASTROESOPHAGEAL REFLUX DISEASE, ESOPHAGITIS PRESENCE NOT SPECIFIED: Primary | ICD-10-CM

## 2020-01-10 DIAGNOSIS — J34.89 RHINORRHEA: ICD-10-CM

## 2020-01-10 DIAGNOSIS — R13.12 OROPHARYNGEAL DYSPHAGIA: ICD-10-CM

## 2020-01-10 PROCEDURE — 99214 OFFICE O/P EST MOD 30 MIN: CPT | Performed by: FAMILY MEDICINE

## 2020-01-10 RX ORDER — PANTOPRAZOLE SODIUM 40 MG/1
40 TABLET, DELAYED RELEASE ORAL DAILY
Qty: 30 TABLET | Refills: 1 | Status: SHIPPED | OUTPATIENT
Start: 2020-01-10 | End: 2020-03-02

## 2020-01-10 ASSESSMENT — PAIN SCALES - GENERAL: PAINLEVEL: SEVERE PAIN (7)

## 2020-01-10 ASSESSMENT — MIFFLIN-ST. JEOR: SCORE: 1138.39

## 2020-01-10 NOTE — NURSING NOTE
"Chief Complaint   Patient presents with     URI       Initial /70 (BP Location: Left arm, Patient Position: Sitting, Cuff Size: Adult Regular)   Pulse 82   Temp 97.6  F (36.4  C) (Tympanic)   Resp 18   Ht 1.626 m (5' 4\")   Wt 55.3 kg (122 lb)   SpO2 98%   BMI 20.94 kg/m   Estimated body mass index is 20.94 kg/m  as calculated from the following:    Height as of this encounter: 1.626 m (5' 4\").    Weight as of this encounter: 55.3 kg (122 lb).  Medication Reconciliation: complete  Patricia Varma LPN  "

## 2020-01-10 NOTE — PROGRESS NOTES
Subjective     Katherine Royal is a 54 year old female who presents to clinic today for the following health issues:    HPI   RESPIRATORY SYMPTOMS      Duration: 4 months    Description  nasal congestion, rhinorrhea, sore throat, facial pain/pressure, cough, ear pain bilateral, fatigue/malaise and stomach ache    Severity: moderate    Accompanying signs and symptoms: see above    History (predisposing factors):  none    Precipitating or alleviating factors: None    Therapies tried and outcome:  rest and fluids oral decongestant antihistamine guaifenesin    Gastrointestinal symptoms      Duration: 4-5 mos    Description:           REFLUX SYMPTOMS - heartburn, acid taste in mouth, nausea and problems swallowing solids and liquids      Intensity:  mild, moderate    Accompanying signs and symptoms:  none    History  Previous {similar problem: YES  Previous evaluation:  none    Aggravating factors: lying down    Alleviating factors: nothing    Other Therapies tried: prilosec and pepcid OTC      Patient Active Problem List   Diagnosis     Type 2 diabetes mellitus without complication, non insulin requiring (H)     HTN (hypertension)     Perimenopausal     Kidney stone on left side     Primary insomnia     Gastroesophageal reflux disease, esophagitis presence not specified     Oropharyngeal dysphagia     Past Surgical History:   Procedure Laterality Date     APPENDECTOMY       LAPAROSCOPY DIAGNOSTIC (GYN) N/A 5/10/2017    Procedure: LAPAROSCOPY DIAGNOSTIC (GYN);  DIAGNOSTIC LAPAROSCOPY, WITH LEFT SALPINGO-OOPHORECTOMY,FULGERATION OF ENDOMETRIOSIS, LYSIS OF ADHESIONS;  Surgeon: Misael Vance MD;  Location: [facility]       Social History     Tobacco Use     Smoking status: Never Smoker     Smokeless tobacco: Never Used   Substance Use Topics     Alcohol use: No     Family History   Problem Relation Age of Onset     Diabetes Mother      Hypertension Mother      Diabetes Father          Current Outpatient Medications   Medication  Sig Dispense Refill     aspirin (EQ ASPIRIN ADULT LOW DOSE) 81 MG EC tablet Take 1 tablet (81 mg) by mouth daily 240 tablet 0     atorvastatin (LIPITOR) 40 MG tablet TAKE 1 TABLET BY MOUTH ONCE DAILY 90 tablet 1     blood glucose (ACCU-CHEK FASTCLIX) lancing device        blood glucose (NO BRAND SPECIFIED) lancets standard Use to test blood sugar 2 times daily or as directed. 100 each 11     blood glucose monitoring (NO BRAND SPECIFIED) test strip Check blood glucose twice daily 100 strip 3     clindamycin (CLEOCIN) 300 MG capsule TAKE 1 CAPSULE BY MOUTH TWICE DAILY FOR 10 DAYS 20 capsule 0     cloNIDine (CATAPRES) 0.1 MG tablet TAKE ONE TABLET BY MOUTH AT BEDTIME FOR  ELEVATED  BLOOD  PRESSURE. 90 tablet 3     hydrochlorothiazide (HYDRODIURIL) 12.5 MG tablet TAKE 1 TABLET BY MOUTH ONCE DAILY 90 tablet 1     lisinopril (PRINIVIL/ZESTRIL) 10 MG tablet TAKE 1 TABLET BY MOUTH ONCE DAILY 90 tablet 1     losartan (COZAAR) 50 MG tablet Take 1.5 tablets (75 mg) by mouth daily 135 tablet 3     metFORMIN (GLUCOPHAGE) 1000 MG tablet TAKE 1 TABLET BY MOUTH TWICE DAILY WITH MEALS 60 tablet 0     mupirocin (BACTROBAN) 2 % cream Apply topically 3 times daily 30 g 0     mupirocin (BACTROBAN) 2 % ointment APPLY  OINTMENT TOPICALLY TO AFFECTED AREA THREE TIMES DAILY 22 g 0     omeprazole (PRILOSEC) 40 MG DR capsule Take 1 capsule (40 mg) by mouth daily Take 30-60 minutes before a meal. 30 capsule 1     pantoprazole (PROTONIX) 40 MG EC tablet Take 1 tablet (40 mg) by mouth daily 30 tablet 1     vitamin B complex with vitamin C (VITAMIN  B COMPLEX) TABS tablet Take 1 tablet by mouth daily 100 tablet 3     No Known Allergies  BP Readings from Last 3 Encounters:   01/10/20 104/70   02/08/19 120/80   06/26/18 100/68    Wt Readings from Last 3 Encounters:   01/10/20 55.3 kg (122 lb)   02/08/19 62.1 kg (137 lb)   06/26/18 61.2 kg (135 lb)        Reviewed and updated as needed this visit by Provider         Review of Systems   ROS COMP:  "Constitutional, HEENT, cardiovascular, pulmonary, gi and gu systems are negative, except as otherwise noted.      Objective    /70 (BP Location: Left arm, Patient Position: Sitting, Cuff Size: Adult Regular)   Pulse 82   Temp 97.6  F (36.4  C) (Tympanic)   Resp 18   Ht 1.626 m (5' 4\")   Wt 55.3 kg (122 lb)   SpO2 98%   BMI 20.94 kg/m    Body mass index is 20.94 kg/m .  Physical Exam   GENERAL: healthy, alert and no distress  NECK: no adenopathy, no asymmetry, masses, or scars and thyroid normal to palpation  RESP: lungs clear to auscultation - no rales, rhonchi or wheezes  CV: regular rate and rhythm, normal S1 S2, no S3 or S4, no murmur, click or rub, no peripheral edema and peripheral pulses strong  ABDOMEN: tenderness epigastric and bowel sounds normal  MS: no gross musculoskeletal defects noted, no edema  PSYCH: mentation appears normal, affect normal/bright    Diagnostic Test Results:  Labs reviewed in Epic  No results found for any visits on 01/10/20.        Assessment & Plan     (K21.9) Gastroesophageal reflux disease, esophagitis presence not specified  (primary encounter diagnosis)  Comment:   Plan: XR Esophagram, pantoprazole (PROTONIX) 40 MG EC        tablet        Start new med, get imaging and/or gen surg consult pending results  Not sleeping much with working in the middle of the night for 4 hrs?  Follow-up primary 2 mos    (R13.12) Oropharyngeal dysphagia  Comment:   Plan: XR Esophagram        Pending results follow-up gen surg    (J34.89) Rhinorrhea  Comment:   Plan: start anti histamine OTC     Patient was agreeable to this plan and had no further questions.  There are no Patient Instructions on file for this visit.    No follow-ups on file.    Yesi Miner MD  [facility] - [place]      "

## 2020-02-05 ENCOUNTER — TELEPHONE (OUTPATIENT)
Dept: FAMILY MEDICINE | Facility: OTHER | Age: 55
End: 2020-02-05

## 2020-02-05 NOTE — TELEPHONE ENCOUNTER
Patient called and wants refill on Clindamycin for sinus and itchy throat.Updated pt that if still having symptoms can make appt. Clindamycin last filled on 12.26.19 #20.She does not want appt and offered to make.She just needs refill. Attempted to triage symptoms but pt would not answer and states- no she has no insurance and cannot come in.Updated her that a message could be sent, but she stated no thank you and hung up.    Sofi Lyles, RN

## 2020-02-25 NOTE — PROGRESS NOTES
Subjective     Katherine Royal is a 54 year old female who presents to clinic today for the following health issues:      RESPIRATORY SYMPTOMS      Duration: year    Description  nasal congestion, rhinorrhea, sore throat, facial pain/pressure, cough, headache and fatigue/malaise    Severity: mild    Accompanying signs and symptoms: see above    History (predisposing factors):  none    Precipitating or alleviating factors: None    Therapies tried and outcome:  antihistamine    Katherine has ahd the symptoms as described above.  She was previously treated with clindamycin as well as antihistamines.     Patient Active Problem List   Diagnosis     Type 2 diabetes mellitus without complication, non insulin requiring (H)     HTN (hypertension)     Perimenopausal     Kidney stone on left side     Primary insomnia     Gastroesophageal reflux disease, esophagitis presence not specified     Oropharyngeal dysphagia     Past Surgical History:   Procedure Laterality Date     APPENDECTOMY       LAPAROSCOPY DIAGNOSTIC (GYN) N/A 5/10/2017    Procedure: LAPAROSCOPY DIAGNOSTIC (GYN);  DIAGNOSTIC LAPAROSCOPY, WITH LEFT SALPINGO-OOPHORECTOMY,FULGERATION OF ENDOMETRIOSIS, LYSIS OF ADHESIONS;  Surgeon: Misael Vance MD;  Location: [facility]       Social History     Tobacco Use     Smoking status: Never Smoker     Smokeless tobacco: Never Used   Substance Use Topics     Alcohol use: No     Family History   Problem Relation Age of Onset     Diabetes Mother      Hypertension Mother      Diabetes Father          Current Outpatient Medications   Medication Sig Dispense Refill     aspirin (EQ ASPIRIN ADULT LOW DOSE) 81 MG EC tablet Take 1 tablet (81 mg) by mouth daily 240 tablet 0     atorvastatin (LIPITOR) 40 MG tablet TAKE 1 TABLET BY MOUTH ONCE DAILY 90 tablet 1     blood glucose (ACCU-CHEK FASTCLIX) lancing device        blood glucose (NO BRAND SPECIFIED) lancets standard Use to test blood sugar 2 times daily or as directed. 100 each 11     blood  "glucose monitoring (NO BRAND SPECIFIED) test strip Check blood glucose twice daily 100 strip 3     clindamycin (CLEOCIN) 300 MG capsule TAKE 1 CAPSULE BY MOUTH TWICE DAILY FOR 10 DAYS 20 capsule 0     cloNIDine (CATAPRES) 0.1 MG tablet TAKE ONE TABLET BY MOUTH AT BEDTIME FOR  ELEVATED  BLOOD  PRESSURE. 90 tablet 3     hydrochlorothiazide (HYDRODIURIL) 12.5 MG tablet TAKE 1 TABLET BY MOUTH ONCE DAILY 90 tablet 1     lisinopril (PRINIVIL/ZESTRIL) 10 MG tablet TAKE 1 TABLET BY MOUTH ONCE DAILY 90 tablet 1     losartan (COZAAR) 50 MG tablet Take 1.5 tablets (75 mg) by mouth daily 135 tablet 3     metFORMIN (GLUCOPHAGE) 1000 MG tablet TAKE 1 TABLET BY MOUTH TWICE DAILY WITH MEALS 60 tablet 0     mupirocin (BACTROBAN) 2 % cream Apply topically 3 times daily 30 g 0     mupirocin (BACTROBAN) 2 % ointment APPLY  OINTMENT TOPICALLY TO AFFECTED AREA THREE TIMES DAILY 22 g 0     omeprazole (PRILOSEC) 40 MG DR capsule Take 1 capsule (40 mg) by mouth daily Take 30-60 minutes before a meal. 30 capsule 1     pantoprazole (PROTONIX) 40 MG EC tablet Take 1 tablet (40 mg) by mouth daily 30 tablet 1     vitamin B complex with vitamin C (VITAMIN  B COMPLEX) TABS tablet Take 1 tablet by mouth daily 100 tablet 3     No Known Allergies  BP Readings from Last 3 Encounters:   02/28/20 100/68   01/10/20 104/70   02/08/19 120/80    Wt Readings from Last 3 Encounters:   02/28/20 55.3 kg (122 lb)   01/10/20 55.3 kg (122 lb)   02/08/19 62.1 kg (137 lb)         Reviewed and updated as needed this visit by Provider         Review of Systems   ROS COMP: Constitutional, HEENT, cardiovascular, pulmonary, gi and gu systems are negative, except as otherwise noted.      Objective    /68 (BP Location: Right arm, Patient Position: Sitting, Cuff Size: Adult Regular)   Pulse 89   Temp 97.5  F (36.4  C) (Tympanic)   Resp 18   Ht 1.626 m (5' 4\")   Wt 55.3 kg (122 lb)   SpO2 98%   BMI 20.94 kg/m    Body mass index is 20.94 kg/m .  Physical " Exam  Vitals signs and nursing note reviewed.   Constitutional:       General: She is not in acute distress.     Appearance: She is well-developed.   HENT:      Head: Normocephalic and atraumatic.      Right Ear: External ear normal.      Left Ear: External ear normal.   Eyes:      Conjunctiva/sclera: Conjunctivae normal.      Pupils: Pupils are equal, round, and reactive to light.   Neck:      Musculoskeletal: Neck supple.   Pulmonary:      Effort: Pulmonary effort is normal.      Breath sounds: Normal breath sounds.   Lymphadenopathy:      Cervical: No cervical adenopathy.   Skin:     General: Skin is warm and dry.   Neurological:      Mental Status: She is alert and oriented to person, place, and time.          Diagnostic Test Results:  Labs reviewed in Epic        Assessment & Plan     1. Acute recurrent frontal sinusitis  Discussed the chronic and recurrent nature of her symptoms.  Begin Flonase and levofloxacin as written.  Follow up as arranged.  - fluticasone (FLONASE) 50 MCG/ACT nasal spray; Spray 1 spray into both nostrils daily  Dispense: 11 g; Refill: 1  - levofloxacin (LEVAQUIN) 500 MG tablet; Take 1 tablet (500 mg) by mouth daily  Dispense: 10 tablet; Refill: 1    2. Gastroesophageal reflux disease, esophagitis presence not specified  Refilled as written.  - pantoprazole (PROTONIX) 20 MG EC tablet; Take 2 tablets (40 mg) by mouth daily  Dispense: 30 tablet; Refill: 0       See Patient Instructions    No follow-ups on file.    Delbert Rosas MD  [facility] - [place]

## 2020-02-28 ENCOUNTER — OFFICE VISIT (OUTPATIENT)
Dept: FAMILY MEDICINE | Facility: OTHER | Age: 55
End: 2020-02-28
Attending: FAMILY MEDICINE

## 2020-02-28 VITALS
OXYGEN SATURATION: 98 % | HEIGHT: 64 IN | BODY MASS INDEX: 20.83 KG/M2 | RESPIRATION RATE: 18 BRPM | WEIGHT: 122 LBS | HEART RATE: 89 BPM | SYSTOLIC BLOOD PRESSURE: 100 MMHG | DIASTOLIC BLOOD PRESSURE: 68 MMHG | TEMPERATURE: 97.5 F

## 2020-02-28 DIAGNOSIS — K21.9 GASTROESOPHAGEAL REFLUX DISEASE, ESOPHAGITIS PRESENCE NOT SPECIFIED: ICD-10-CM

## 2020-02-28 DIAGNOSIS — J01.11 ACUTE RECURRENT FRONTAL SINUSITIS: Primary | ICD-10-CM

## 2020-02-28 PROCEDURE — 99213 OFFICE O/P EST LOW 20 MIN: CPT | Performed by: FAMILY MEDICINE

## 2020-02-28 RX ORDER — LEVOFLOXACIN 500 MG/1
500 TABLET, FILM COATED ORAL DAILY
Qty: 10 TABLET | Refills: 1 | Status: SHIPPED | OUTPATIENT
Start: 2020-02-28 | End: 2020-07-10

## 2020-02-28 RX ORDER — FLUTICASONE PROPIONATE 50 MCG
1 SPRAY, SUSPENSION (ML) NASAL DAILY
Qty: 11 G | Refills: 1 | Status: SHIPPED | OUTPATIENT
Start: 2020-02-28 | End: 2020-07-10

## 2020-02-28 RX ORDER — PANTOPRAZOLE SODIUM 20 MG/1
40 TABLET, DELAYED RELEASE ORAL DAILY
Qty: 30 TABLET | Refills: 0 | Status: SHIPPED | OUTPATIENT
Start: 2020-02-28 | End: 2020-07-10

## 2020-02-28 ASSESSMENT — MIFFLIN-ST. JEOR: SCORE: 1138.39

## 2020-02-28 ASSESSMENT — PAIN SCALES - GENERAL: PAINLEVEL: MILD PAIN (2)

## 2020-02-28 NOTE — NURSING NOTE
"Chief Complaint   Patient presents with     URI       Initial /68 (BP Location: Right arm, Patient Position: Sitting, Cuff Size: Adult Regular)   Pulse 89   Temp 97.5  F (36.4  C) (Tympanic)   Resp 18   Ht 1.626 m (5' 4\")   Wt 55.3 kg (122 lb)   SpO2 98%   BMI 20.94 kg/m   Estimated body mass index is 20.94 kg/m  as calculated from the following:    Height as of this encounter: 1.626 m (5' 4\").    Weight as of this encounter: 55.3 kg (122 lb).  Medication Reconciliation: complete  Patricia Varma LPN  "

## 2020-03-02 DIAGNOSIS — K21.9 GASTROESOPHAGEAL REFLUX DISEASE, ESOPHAGITIS PRESENCE NOT SPECIFIED: ICD-10-CM

## 2020-03-02 RX ORDER — PANTOPRAZOLE SODIUM 40 MG/1
40 TABLET, DELAYED RELEASE ORAL DAILY
Qty: 30 TABLET | Refills: 1 | Status: SHIPPED | OUTPATIENT
Start: 2020-03-02 | End: 2020-12-18

## 2020-03-10 ENCOUNTER — TELEPHONE (OUTPATIENT)
Dept: FAMILY MEDICINE | Facility: OTHER | Age: 55
End: 2020-03-10

## 2020-03-10 NOTE — TELEPHONE ENCOUNTER
Patient states she will be getting insurance from job and once that takes place she will call and get in to see ENT.

## 2020-03-10 NOTE — TELEPHONE ENCOUNTER
Patient called and states she has completed course of antibiotics and still not improved, please advise.

## 2020-03-10 NOTE — TELEPHONE ENCOUNTER
Pt calling stating that she finished antibiotics and is still feeling pretty ill. Still coughing, producing phlegm. Itchy throat, no more ear pain. Wondering what to do now. Please give patient a call back. Thank you

## 2020-04-21 ENCOUNTER — TELEPHONE (OUTPATIENT)
Dept: FAMILY MEDICINE | Facility: OTHER | Age: 55
End: 2020-04-21

## 2020-04-21 NOTE — TELEPHONE ENCOUNTER
Spoke with patient and scheduled telephone visit for May due to insurance. Patient was told if any changes to appointment she would be notified.

## 2020-04-21 NOTE — TELEPHONE ENCOUNTER
Pt would like Dr. Alison Gomez's nurse to call her in regards to an appointment and other issues.

## 2020-05-13 ENCOUNTER — TELEPHONE (OUTPATIENT)
Dept: FAMILY MEDICINE | Facility: OTHER | Age: 55
End: 2020-05-13

## 2020-05-13 ENCOUNTER — VIRTUAL VISIT (OUTPATIENT)
Dept: FAMILY MEDICINE | Facility: OTHER | Age: 55
End: 2020-05-13
Attending: FAMILY MEDICINE
Payer: COMMERCIAL

## 2020-05-13 VITALS — BODY MASS INDEX: 20.94 KG/M2 | WEIGHT: 122 LBS

## 2020-05-13 DIAGNOSIS — I10 BENIGN ESSENTIAL HYPERTENSION: ICD-10-CM

## 2020-05-13 DIAGNOSIS — E11.9 TYPE 2 DIABETES MELLITUS WITHOUT COMPLICATION, WITHOUT LONG-TERM CURRENT USE OF INSULIN (H): Primary | ICD-10-CM

## 2020-05-13 DIAGNOSIS — J02.9 PHARYNGITIS, UNSPECIFIED ETIOLOGY: ICD-10-CM

## 2020-05-13 PROCEDURE — 99213 OFFICE O/P EST LOW 20 MIN: CPT | Mod: TEL | Performed by: FAMILY MEDICINE

## 2020-05-13 ASSESSMENT — PAIN SCALES - GENERAL: PAINLEVEL: MILD PAIN (2)

## 2020-05-13 NOTE — PROGRESS NOTES
"Katherine Royal is a 54 year old female who is being evaluated via a billable telephone visit.      The patient has been notified of following:     \"This telephone visit will be conducted via a call between you and your physician/provider. We have found that certain health care needs can be provided without the need for a physical exam.  This service lets us provide the care you need with a short phone conversation.  If a prescription is necessary we can send it directly to your pharmacy.  If lab work is needed we can place an order for that and you can then stop by our lab to have the test done at a later time.    Telephone visits are billed at different rates depending on your insurance coverage. During this emergency period, for some insurers they may be billed the same as an in-person visit.  Please reach out to your insurance provider with any questions.    If during the course of the call the physician/provider feels a telephone visit is not appropriate, you will not be charged for this service.\"    Patient has given verbal consent for Telephone visit?  Yes    What phone number would you like to be contacted at? [phone]    How would you like to obtain your AVS? Mail a copy    Subjective     Katherine Royal is a 54 year old female who presents to clinic today for the following health issues:    HPI  Diabetes Follow-up    How often are you checking your blood sugar? A few times a week  What time of day are you checking your blood sugars (select all that apply)?  In the am before breakfast  Have you had any blood sugars above 200?  No  Have you had any blood sugars below 70?  No    What symptoms do you notice when your blood sugar is low?  None    What concerns do you have today about your diabetes? None     Do you have any of these symptoms? (Select all that apply)  No numbness or tingling in feet.  No redness, sores or blisters on feet.  No complaints of excessive thirst.  No reports of blurry vision.  No " significant changes to weight.    Have you had a diabetic eye exam in the last 12 months? No          Hyperlipidemia Follow-Up      Are you regularly taking any medication or supplement to lower your cholesterol?   Yes- atorvastatin    Are you having muscle aches or other side effects that you think could be caused by your cholesterol lowering medication?  Yes- occasional muscle aches    Hypertension Follow-up      Do you check your blood pressure regularly outside of the clinic? Yes     Are you following a low salt diet? No    Are your blood pressures ever more than 140 on the top number (systolic) OR more   than 90 on the bottom number (diastolic), for example 140/90? No     Patient reports frequent low blood pressures.     BP Readings from Last 2 Encounters:   02/28/20 100/68   01/10/20 104/70     Hemoglobin A1C (%)   Date Value   10/01/2019 6.6 (H)   06/25/2019 6.3 (H)     LDL Cholesterol Calculated (mg/dL)   Date Value   08/12/2019 51   02/26/2018 95               RESPIRATORY SYMPTOMS      Duration: 1 year    Description  Itchy throat, occasional runny nose    Severity: mild    Accompanying signs and symptoms: None    History (predisposing factors):  none    Precipitating or alleviating factors: None    Therapies tried and outcome:  antihistamine    Patient believes it is allergies.     Patient Active Problem List   Diagnosis     Type 2 diabetes mellitus without complication, non insulin requiring (H)     HTN (hypertension)     Perimenopausal     Kidney stone on left side     Primary insomnia     Gastroesophageal reflux disease, esophagitis presence not specified     Oropharyngeal dysphagia     Past Surgical History:   Procedure Laterality Date     APPENDECTOMY       LAPAROSCOPY DIAGNOSTIC (GYN) N/A 5/10/2017    Procedure: LAPAROSCOPY DIAGNOSTIC (GYN);  DIAGNOSTIC LAPAROSCOPY, WITH LEFT SALPINGO-OOPHORECTOMY,FULGERATION OF ENDOMETRIOSIS, LYSIS OF ADHESIONS;  Surgeon: Misael Vance MD;  Location: [facility]        Social History     Tobacco Use     Smoking status: Never Smoker     Smokeless tobacco: Never Used   Substance Use Topics     Alcohol use: No     Family History   Problem Relation Age of Onset     Diabetes Mother      Hypertension Mother      Diabetes Father          Current Outpatient Medications   Medication Sig Dispense Refill     aspirin (EQ ASPIRIN ADULT LOW DOSE) 81 MG EC tablet Take 1 tablet (81 mg) by mouth daily 240 tablet 0     atorvastatin (LIPITOR) 40 MG tablet TAKE 1 TABLET BY MOUTH ONCE DAILY 90 tablet 1     blood glucose (ACCU-CHEK FASTCLIX) lancing device        blood glucose (NO BRAND SPECIFIED) lancets standard Use to test blood sugar 2 times daily or as directed. 100 each 11     blood glucose monitoring (NO BRAND SPECIFIED) test strip Check blood glucose twice daily 100 strip 3     hydrochlorothiazide (HYDRODIURIL) 12.5 MG tablet TAKE 1 TABLET BY MOUTH ONCE DAILY 90 tablet 1     lisinopril (PRINIVIL/ZESTRIL) 10 MG tablet TAKE 1 TABLET BY MOUTH ONCE DAILY 90 tablet 1     metFORMIN (GLUCOPHAGE) 1000 MG tablet TAKE 1 TABLET BY MOUTH TWICE DAILY WITH MEALS 60 tablet 0     vitamin B complex with vitamin C (VITAMIN  B COMPLEX) TABS tablet Take 1 tablet by mouth daily 100 tablet 3     clindamycin (CLEOCIN) 300 MG capsule TAKE 1 CAPSULE BY MOUTH TWICE DAILY FOR 10 DAYS (Patient not taking: Reported on 5/13/2020) 20 capsule 0     cloNIDine (CATAPRES) 0.1 MG tablet TAKE ONE TABLET BY MOUTH AT BEDTIME FOR  ELEVATED  BLOOD  PRESSURE. (Patient not taking: Reported on 5/13/2020) 90 tablet 3     fluticasone (FLONASE) 50 MCG/ACT nasal spray Spray 1 spray into both nostrils daily (Patient not taking: Reported on 5/13/2020) 11 g 1     levofloxacin (LEVAQUIN) 500 MG tablet Take 1 tablet (500 mg) by mouth daily (Patient not taking: Reported on 5/13/2020) 10 tablet 1     losartan (COZAAR) 50 MG tablet Take 1.5 tablets (75 mg) by mouth daily (Patient not taking: Reported on 5/13/2020) 135 tablet 3     mupirocin  (BACTROBAN) 2 % cream Apply topically 3 times daily (Patient not taking: Reported on 5/13/2020) 30 g 0     mupirocin (BACTROBAN) 2 % ointment APPLY  OINTMENT TOPICALLY TO AFFECTED AREA THREE TIMES DAILY (Patient not taking: Reported on 5/13/2020) 22 g 0     omeprazole (PRILOSEC) 40 MG DR capsule Take 1 capsule (40 mg) by mouth daily Take 30-60 minutes before a meal. (Patient not taking: Reported on 5/13/2020) 30 capsule 1     pantoprazole (PROTONIX) 20 MG EC tablet Take 2 tablets (40 mg) by mouth daily (Patient not taking: Reported on 5/13/2020) 30 tablet 0     pantoprazole (PROTONIX) 40 MG EC tablet Take 1 tablet (40 mg) by mouth daily (Patient not taking: Reported on 5/13/2020) 30 tablet 1     No Known Allergies  Recent Labs   Lab Test 10/01/19  1920 08/12/19  0800 06/25/19  1845 02/08/19  1618  06/26/18  1640  02/26/18  0911  03/14/17   A1C 6.6*  --  6.3* 6.5*   < > 6.3*   < >  --    < >  --    LDL  --  51  --   --   --   --   --  95  --  68   HDL  --  64  --   --   --   --   --  75  --  54   TRIG  --  110  --   --   --   --   --  195*  --  350*   ALT  --  36  --   --   --  30  --  50  --  38   CR  --  0.54  --   --   --  0.75  --  0.56  --  0.6*   GFRESTIMATED  --  >90  --   --   --  81  --  >90   < >  --    GFRESTBLACK  --  >90  --   --   --  >90  --  >90   < >  --    POTASSIUM  --  3.8  --   --   --  4.0  --  4.0  --  4.0   TSH  --   --   --   --   --  0.60  --  1.36  --   --     < > = values in this interval not displayed.      BP Readings from Last 3 Encounters:   02/28/20 100/68   01/10/20 104/70   02/08/19 120/80    Wt Readings from Last 3 Encounters:   05/13/20 55.3 kg (122 lb)   02/28/20 55.3 kg (122 lb)   01/10/20 55.3 kg (122 lb)                    Reviewed and updated as needed this visit by Provider         Review of Systems   Constitutional, HEENT, cardiovascular, pulmonary, gi and gu systems are negative, except as otherwise noted.       Objective   Reported vitals:  There were no vitals taken for  this visit.   healthy, alert and no distress  PSYCH: Alert and oriented times 3; coherent speech, normal   rate and volume, able to articulate logical thoughts, able   to abstract reason, no tangential thoughts, no hallucinations   or delusions  Her affect is normal  RESP: No cough, no audible wheezing, able to talk in full sentences  Remainder of exam unable to be completed due to telephone visits    Diagnostic Test Results:  Labs reviewed in Epic        Assessment/Plan:  1. Type 2 diabetes mellitus without complication, without long-term current use of insulin (H)  Fasting labs are reviewed.  Goal of hemoglobin A1C of less than 7 discussed.  Instructed in the importance of diet, exercise, and good glycemic control in prevention of secondary complications.    Continue same medication regimen. Repeat fasting glucose and hemoglobin A1C in 6 months.      2. HTN (hypertension)  Goals reviewed.  Continue home BP monitoring and same medication regimen.  Follow up 6 months.      3. Pharyngitis, unspecified etiology  Will continue symptomatic therapy      No follow-ups on file.      Phone call duration:  20 minutes    Delbert Rosas MD

## 2020-05-13 NOTE — TELEPHONE ENCOUNTER
Patient is waiting for her telephone visit phone call w/Dr. Rosas. Pt stated she leave for work @ 12 as she's afternoon shit.    Phone.[phone]

## 2020-05-13 NOTE — NURSING NOTE
"Chief Complaint   Patient presents with     Diabetes     Hypertension     Pharyngitis       Initial Wt 55.3 kg (122 lb)   BMI 20.94 kg/m   Estimated body mass index is 20.94 kg/m  as calculated from the following:    Height as of 2/28/20: 1.626 m (5' 4\").    Weight as of this encounter: 55.3 kg (122 lb).  Medication Reconciliation: complete  Colten Estrada LPN  "

## 2020-05-13 NOTE — TELEPHONE ENCOUNTER
Patient waiting for appt with MD at this time. She states she has to work at noon.Offered to reschedule and she did not make.Is he available this Friday?    Sofi Lyles RN    Call [phone]

## 2020-05-15 DIAGNOSIS — Z98.890 POSTOPERATIVE STATE: ICD-10-CM

## 2020-05-15 RX ORDER — MUPIROCIN 20 MG/G
OINTMENT TOPICAL
Qty: 22 G | Refills: 0 | Status: SHIPPED | OUTPATIENT
Start: 2020-05-15 | End: 2020-07-10

## 2020-05-22 ENCOUNTER — APPOINTMENT (OUTPATIENT)
Dept: LAB | Facility: OTHER | Age: 55
End: 2020-05-22
Attending: FAMILY MEDICINE
Payer: COMMERCIAL

## 2020-05-22 DIAGNOSIS — E11.9 TYPE 2 DIABETES MELLITUS WITHOUT COMPLICATION, WITHOUT LONG-TERM CURRENT USE OF INSULIN (H): Primary | ICD-10-CM

## 2020-05-22 LAB
ALBUMIN SERPL-MCNC: 4.3 G/DL (ref 3.4–5)
ALP SERPL-CCNC: 72 U/L (ref 40–150)
ALT SERPL W P-5'-P-CCNC: 37 U/L (ref 0–50)
ANION GAP SERPL CALCULATED.3IONS-SCNC: 8 MMOL/L (ref 3–14)
AST SERPL W P-5'-P-CCNC: 16 U/L (ref 0–45)
BASOPHILS # BLD AUTO: 0.1 10E9/L (ref 0–0.2)
BASOPHILS NFR BLD AUTO: 1.3 %
BILIRUB SERPL-MCNC: 0.5 MG/DL (ref 0.2–1.3)
BUN SERPL-MCNC: 15 MG/DL (ref 7–30)
CALCIUM SERPL-MCNC: 9.9 MG/DL (ref 8.5–10.1)
CHLORIDE SERPL-SCNC: 108 MMOL/L (ref 94–109)
CHOLEST SERPL-MCNC: 155 MG/DL
CO2 SERPL-SCNC: 25 MMOL/L (ref 20–32)
CREAT SERPL-MCNC: 0.49 MG/DL (ref 0.52–1.04)
DIFFERENTIAL METHOD BLD: NORMAL
EOSINOPHIL # BLD AUTO: 0.1 10E9/L (ref 0–0.7)
EOSINOPHIL NFR BLD AUTO: 1.9 %
ERYTHROCYTE [DISTWIDTH] IN BLOOD BY AUTOMATED COUNT: 12.3 % (ref 10–15)
EST. AVERAGE GLUCOSE BLD GHB EST-MCNC: 128 MG/DL
GFR SERPL CREATININE-BSD FRML MDRD: >90 ML/MIN/{1.73_M2}
GLUCOSE SERPL-MCNC: 107 MG/DL (ref 70–99)
HBA1C MFR BLD: 6.1 % (ref 0–5.6)
HCT VFR BLD AUTO: 40.2 % (ref 35–47)
HDLC SERPL-MCNC: 82 MG/DL
HGB BLD-MCNC: 13.8 G/DL (ref 11.7–15.7)
IMM GRANULOCYTES # BLD: 0 10E9/L (ref 0–0.4)
IMM GRANULOCYTES NFR BLD: 0.2 %
LDLC SERPL CALC-MCNC: 61 MG/DL
LYMPHOCYTES # BLD AUTO: 1.8 10E9/L (ref 0.8–5.3)
LYMPHOCYTES NFR BLD AUTO: 37.9 %
MCH RBC QN AUTO: 30.3 PG (ref 26.5–33)
MCHC RBC AUTO-ENTMCNC: 34.3 G/DL (ref 31.5–36.5)
MCV RBC AUTO: 88 FL (ref 78–100)
MONOCYTES # BLD AUTO: 0.3 10E9/L (ref 0–1.3)
MONOCYTES NFR BLD AUTO: 6.5 %
NEUTROPHILS # BLD AUTO: 2.5 10E9/L (ref 1.6–8.3)
NEUTROPHILS NFR BLD AUTO: 52.2 %
NONHDLC SERPL-MCNC: 73 MG/DL
NRBC # BLD AUTO: 0 10*3/UL
NRBC BLD AUTO-RTO: 0 /100
PLATELET # BLD AUTO: 268 10E9/L (ref 150–450)
POTASSIUM SERPL-SCNC: 4.2 MMOL/L (ref 3.4–5.3)
PROT SERPL-MCNC: 7.7 G/DL (ref 6.8–8.8)
RBC # BLD AUTO: 4.56 10E12/L (ref 3.8–5.2)
SODIUM SERPL-SCNC: 141 MMOL/L (ref 133–144)
TRIGL SERPL-MCNC: 62 MG/DL
TSH SERPL DL<=0.005 MIU/L-ACNC: 0.68 MU/L (ref 0.4–4)
WBC # BLD AUTO: 4.8 10E9/L (ref 4–11)

## 2020-05-22 PROCEDURE — 80061 LIPID PANEL: CPT | Performed by: FAMILY MEDICINE

## 2020-05-22 PROCEDURE — 83036 HEMOGLOBIN GLYCOSYLATED A1C: CPT | Performed by: FAMILY MEDICINE

## 2020-05-22 PROCEDURE — 36415 COLL VENOUS BLD VENIPUNCTURE: CPT | Performed by: FAMILY MEDICINE

## 2020-05-22 PROCEDURE — 80050 GENERAL HEALTH PANEL: CPT | Performed by: FAMILY MEDICINE

## 2020-05-26 ENCOUNTER — TELEPHONE (OUTPATIENT)
Dept: FAMILY MEDICINE | Facility: OTHER | Age: 55
End: 2020-05-26

## 2020-05-26 DIAGNOSIS — E78.2 MIXED HYPERLIPIDEMIA: ICD-10-CM

## 2020-05-26 DIAGNOSIS — R05.9 COUGH: ICD-10-CM

## 2020-05-26 DIAGNOSIS — J32.0 CHRONIC MAXILLARY SINUSITIS: ICD-10-CM

## 2020-05-26 DIAGNOSIS — I10 BENIGN ESSENTIAL HYPERTENSION: Primary | ICD-10-CM

## 2020-05-26 NOTE — TELEPHONE ENCOUNTER
Please call the patient, she has questions about her  Appointment last week.  She is looking for medicine for her cough with phlegm and itchy throat and has questions.  States she was told to see ENT and wants lab results.  She states nobody is paying attention to her health.  States she is coughing all the time and can't sleep.  Labs were mailed to patient on 5/22, she still has questions.  She would like to go to ENT for allergies.    [phone]

## 2020-05-26 NOTE — TELEPHONE ENCOUNTER
Pt called again, requesting med for allergies. Also requesting something to help with her cough. Pt also wondering if her cholesterol medication can be lowered to 20mg because she is experiencing muscle pains.  Please advise. Thank you!

## 2020-05-27 DIAGNOSIS — K21.9 GASTROESOPHAGEAL REFLUX DISEASE, ESOPHAGITIS PRESENCE NOT SPECIFIED: ICD-10-CM

## 2020-05-27 RX ORDER — ATORVASTATIN CALCIUM 20 MG/1
20 TABLET, FILM COATED ORAL DAILY
Qty: 90 TABLET | Refills: 1 | Status: SHIPPED | OUTPATIENT
Start: 2020-05-27 | End: 2020-09-30

## 2020-05-27 RX ORDER — OMEPRAZOLE 40 MG/1
CAPSULE, DELAYED RELEASE ORAL
Qty: 30 CAPSULE | Refills: 3 | Status: SHIPPED | OUTPATIENT
Start: 2020-05-27 | End: 2020-12-18

## 2020-05-27 RX ORDER — BROMPHENIRAMINE MALEATE, PSEUDOEPHEDRINE HYDROCHLORIDE, AND DEXTROMETHORPHAN HYDROBROMIDE 2; 30; 10 MG/5ML; MG/5ML; MG/5ML
5 SYRUP ORAL 4 TIMES DAILY PRN
Qty: 473 ML | Refills: 1 | Status: SHIPPED | OUTPATIENT
Start: 2020-05-27 | End: 2020-12-18

## 2020-05-27 NOTE — TELEPHONE ENCOUNTER
"Patient calling back and requesting medication for \"cough and itchy throat\". Patient also requesting if she can lower her atorvastatin to 20 mg. Patient is not wanting to see ENT at this time. Pended atorvastatin for 20 mg and cough and allergy syrup for review and approval.  Please advise, thank you.  "

## 2020-05-27 NOTE — TELEPHONE ENCOUNTER
Pt called back and was informed of below. Pt needs copy of her labs mailed to her. Doesn't look like this was ever taken care of. Please advise. Thank you!

## 2020-05-29 ENCOUNTER — TELEPHONE (OUTPATIENT)
Dept: FAMILY MEDICINE | Facility: OTHER | Age: 55
End: 2020-05-29

## 2020-05-29 NOTE — TELEPHONE ENCOUNTER
Call from patient on lab results.     Patient requesting all lab results from 5/22/20 to be mailed to her.

## 2020-06-03 ENCOUNTER — TELEPHONE (OUTPATIENT)
Dept: FAMILY MEDICINE | Facility: OTHER | Age: 55
End: 2020-06-03

## 2020-06-03 NOTE — TELEPHONE ENCOUNTER
Received a PA from [facility] for Omeprazole 40 mg dr capsules. Submitted on CMM. Waiting for a response.

## 2020-06-08 ENCOUNTER — TELEPHONE (OUTPATIENT)
Dept: FAMILY MEDICINE | Facility: OTHER | Age: 55
End: 2020-06-08

## 2020-06-08 DIAGNOSIS — R05.9 COUGH: Primary | ICD-10-CM

## 2020-06-08 NOTE — TELEPHONE ENCOUNTER
Received a DENIAL notification through [facility] for Omeprazole 40 mg dr capsules, that this medication is not covered for patient age. I haven't received the official letter from [employer] yet to know more detail. I will update this encounter once I receive it. Forms scanned to Epic.

## 2020-06-08 NOTE — TELEPHONE ENCOUNTER
Patient called and stated that she spoke with [facility] pharmacy and stated she has been having a cough for the past year. Patient was advised by the pharmacist to speak to PCP regarding lisinopril due to cough is a side effect of medication. Patient also advised it could be allergies and dry mouth. Patient requesting a referral as well to ENT. Please advise.

## 2020-06-08 NOTE — TELEPHONE ENCOUNTER
Delbert Rosas MD  You 23 minutes ago (10:33 AM)       Approve referral to ENT    Message text      ENT referral pended

## 2020-06-09 NOTE — TELEPHONE ENCOUNTER
"10:32 AM  Pt called again and stated \"should we should my lisinopril because of my cough for one year, I'd like to try something else.\"    Pt updated regarding referral to ENT was signed.    Pharmacy pended pt requesting med change.   "

## 2020-06-10 NOTE — TELEPHONE ENCOUNTER
Patient calling back again. She wants to hold off on changing the lisinopril until after she see's ENT. She is also wanting to get scheduled with ENT. Informed her that the referral was sent and she needs to wait for ENT to call her and schedule. She prefers to be called between 10:30-1:00pm as she works 2:00 pm- 1:00 am. Informed patient I would put the times in the note and send this to ENT so they know when they call her to schedule her appointment.  Ashley A. Lechevalier, LPN on 6/10/2020 at 1:54 PM

## 2020-06-10 NOTE — TELEPHONE ENCOUNTER
Patient calling again about medication and ENT. She would like a call back ASAP to discuss all of this. Please call patient back. Ashley A. Lechevalier, LPN on 6/10/2020 at 10:40 AM

## 2020-06-10 NOTE — TELEPHONE ENCOUNTER
Patient would like to try an alternative for lisinopril. She was advised by pharmacist that a cough is a side effect of medication. Please advise

## 2020-06-11 NOTE — TELEPHONE ENCOUNTER
Pt called again regarding ENT appt. Has spoken with triage x3 today. Pt advised that ENT will be contacting her to schedule appt.

## 2020-07-09 NOTE — PROGRESS NOTES
Otolaryngology Consultation    Patient: Katherine Royal  : 1965    Patient presents with:  Cough: Pt has been referred by Dr. Rosas for a cough.      HPI:  Katherine Royal is a 54 year old female seen today for cough.   Patient has noticed ongoing issues for 1 year of varying coughing. She was seen by Dr. Rosas on 20 and noted concerns for nasal congestion, rhinorrhea, sore throat, facial pain, cough. She had tried AH without relief.   She was treated with Levaquin, Cleocin, Flonase and Prilosec for GERD.   She presents to ENT for ongoing issues regarding cough,   Neuralgia along her right maxillary.   She has pain along her gums- She had upper dental pain that cleared with dental extraction. She had noted that pain has resolved overall. She does have some pain remaining. Her gums appear normal at her recent dental visit.   No prior sinus surgery.   She has some mild stuffiness, nasal drip. She has no reinaldo post nasal drip.   She has sneezing but no allergic conjunctivitis symptoms.   She has some allergy troubles. No help w/ AH.   She has some tenderness along her lower neck w/ pain.   Feels like itchy, sore throat. She then starts coughing.   Denies dysphagia, dysphonia.   Denies globus sensation.   She had terrible coughing, and pain along her anterior throat.           Patient denies sore throat or throat pain  Denies chronic otalgia.   Denies fevers, night sweats or weight loss.     Denies dysphagia or dysphonia.   Denies globus sensation.     Water- adequate  Caffeine- drinks tea  ETOH- None  Tobacco- None  Reflux- Yes.   She tried Prilosec and protonix with good results.   She feels it is less now.   No concerns with taste .     Resides in a house with a basement.  There is no water or mold  Carpet in bedroom- No  Heat in home forced air.   Animals dogs  Family hx of allergies - asthma, brother and mother.     Current Outpatient Rx   Medication Sig Dispense Refill     aspirin (EQ ASPIRIN ADULT  LOW DOSE) 81 MG EC tablet Take 1 tablet (81 mg) by mouth daily 240 tablet 0     atorvastatin (LIPITOR) 20 MG tablet Take 1 tablet (20 mg) by mouth daily 90 tablet 1     atorvastatin (LIPITOR) 40 MG tablet TAKE 1 TABLET BY MOUTH ONCE DAILY 90 tablet 1     blood glucose (ACCU-CHEK FASTCLIX) lancing device        blood glucose (NO BRAND SPECIFIED) lancets standard Use to test blood sugar 2 times daily or as directed. 100 each 11     blood glucose monitoring (NO BRAND SPECIFIED) test strip Check blood glucose twice daily 100 strip 3     clindamycin (CLEOCIN) 300 MG capsule TAKE 1 CAPSULE BY MOUTH TWICE DAILY FOR 10 DAYS (Patient not taking: Reported on 5/13/2020) 20 capsule 0     cloNIDine (CATAPRES) 0.1 MG tablet TAKE ONE TABLET BY MOUTH AT BEDTIME FOR  ELEVATED  BLOOD  PRESSURE. (Patient not taking: Reported on 5/13/2020) 90 tablet 3     fluticasone (FLONASE) 50 MCG/ACT nasal spray Spray 1 spray into both nostrils daily (Patient not taking: Reported on 5/13/2020) 11 g 1     hydrochlorothiazide (HYDRODIURIL) 12.5 MG tablet TAKE 1 TABLET BY MOUTH ONCE DAILY 90 tablet 1     levofloxacin (LEVAQUIN) 500 MG tablet Take 1 tablet (500 mg) by mouth daily (Patient not taking: Reported on 5/13/2020) 10 tablet 1     lisinopril (PRINIVIL/ZESTRIL) 10 MG tablet TAKE 1 TABLET BY MOUTH ONCE DAILY 90 tablet 1     losartan (COZAAR) 50 MG tablet Take 1.5 tablets (75 mg) by mouth daily (Patient not taking: Reported on 5/13/2020) 135 tablet 3     metFORMIN (GLUCOPHAGE) 1000 MG tablet TAKE 1 TABLET BY MOUTH TWICE DAILY WITH MEALS 60 tablet 0     mupirocin (BACTROBAN) 2 % cream Apply topically 3 times daily (Patient not taking: Reported on 5/13/2020) 30 g 0     mupirocin (BACTROBAN) 2 % external ointment APPLY  OINTMENT TOPICALLY TO AFFECTED AREA THREE TIMES DAILY 22 g 0     omeprazole (PRILOSEC) 40 MG DR capsule TAKE 1 CAPSULE BY MOUTH ONCE DAILY 30-60  MINUTES  BEFORE  A  MEAL 30 capsule 3     pantoprazole (PROTONIX) 20 MG EC tablet Take 2  "tablets (40 mg) by mouth daily (Patient not taking: Reported on 5/13/2020) 30 tablet 0     pantoprazole (PROTONIX) 40 MG EC tablet Take 1 tablet (40 mg) by mouth daily (Patient not taking: Reported on 5/13/2020) 30 tablet 1     etfwratbh-ylgtemig-VT (BROMFED DM) 30-2-10 MG/5ML syrup Take 5 mLs by mouth 4 times daily as needed (Cough and sinusitis) 473 mL 1     vitamin B complex with vitamin C (VITAMIN  B COMPLEX) TABS tablet Take 1 tablet by mouth daily 100 tablet 3       Allergies: Patient has no known allergies.     No past medical history on file.    Past Surgical History:   Procedure Laterality Date     APPENDECTOMY       LAPAROSCOPY DIAGNOSTIC (GYN) N/A 5/10/2017    Procedure: LAPAROSCOPY DIAGNOSTIC (GYN);  DIAGNOSTIC LAPAROSCOPY, WITH LEFT SALPINGO-OOPHORECTOMY,FULGERATION OF ENDOMETRIOSIS, LYSIS OF ADHESIONS;  Surgeon: Misael Vance MD;  Location: [facility] OR       ENT family history reviewed    Social History     Tobacco Use     Smoking status: Never Smoker     Smokeless tobacco: Never Used   Substance Use Topics     Alcohol use: No     Drug use: No       Review of Systems  ROS: 10 point ROS neg other than the symptoms noted above in the HPI     Physical Exam  /66 (Cuff Size: Adult Regular)   Pulse 86   Temp 97.4  F (36.3  C) (Tympanic)   Ht 1.626 m (5' 4\")   Wt 49.9 kg (110 lb)   SpO2 98%   BMI 18.88 kg/m      General - The patient is well nourished and well developed, and appears to have good nutritional status.  Alert and oriented to person and place, answers questions and cooperates with examination appropriately.   Head and Face - Normocephalic and atraumatic, with no gross asymmetry noted.  The facial nerve is intact, with strong symmetric movements.  Voice and Breathing - The patient was breathing comfortably without the use of accessory muscles. There was no wheezing, stridor, or stertor.  The patients voice was clear and strong, and had appropriate pitch and quality.  Ears -The external " auditory canals are patent, the tympanic membranes are intact without effusion, retraction or mass.  Bony landmarks are intact.  Eyes - Extraocular movements intact, and the pupils were reactive to light.  Sclera were not icteric or injected, conjunctiva were pink and moist.  Mouth - Examination of the oral cavity showed pink, healthy oral mucosa. No lesions or ulcerations noted.  The tongue was mobile and midline,  Upper plate.   Throat - The walls of the oropharynx were smooth, pink, moist, symmetric, and had no lesions or ulcerations.  The tonsillar pillars and soft palate were symmetric.  The uvula was midline on elevation.    Neck - Normal midline excursion of the laryngotracheal complex during swallowing.  Full range of motion on passive movement.  Palpation of the occipital, submental, submandibular, internal jugular chain, and supraclavicular nodes did not demonstrate any abnormal lymph nodes or masses.  Palpation of the thyroid was soft and smooth, with no nodules or goiter appreciated.  The trachea was mobile and midline.  Nose - External contour is symmetric, no gross deflection or scars.  Nasal mucosa is pink and moist with no abnormal mucus.   Neck- She has tenderness along posterior cervical spine along SCM and superior to sternal notch. ROm intact.   Flexible Endoscopy -     Attempts at mirror laryngoscopy were not possible due to gag reflex.  Therefore I proceeded with a fiberoptic examination.  First I sprayed both sides of the nose with a mixture of lidocaine and neosynephrine.  I then passed the scope through the nasal cavity. The nasal cavity was overall clear. No polyps. She did have clear rhinorrhea throughout and extending along posterior pharynx.  The nasopharynx was mucosally covered and symmetric.  The Eustachian tube openings were unobstructed.  Going further down I had a clear view of the base of tongue which had normal appearing lingual tonsillar tissue.  The base of tongue was free of  lesions, and the vallecula was open.  The epiglottis was smooth and mucosally covered.  The supraglottic larynx was then clearly visualized.  The vocal cords moved smoothly and symmetrically, they were pearly white and no lesions were seen.  The pyriform sinuses were open, and the limited view of the postcricoid region did not show any lesions.          ASSESSMENT:    ICD-10-CM    1. Cough  R05 OTOLARYNGOLOGY REFERRAL     fluticasone (FLONASE) 50 MCG/ACT nasal spray     CT Sinus w/o Contrast   2. Allergic rhinitis, unspecified seasonality, unspecified trigger  J30.9 fluticasone (FLONASE) 50 MCG/ACT nasal spray   3. Post-nasal drip  R09.82    4. Cervical pain  M54.2 PHYSICAL THERAPY REFERRAL   5. Facial pain  R51 CT Sinus w/o Contrast   6. Recurrent sinusitis  J32.9 CT Sinus w/o Contrast         She has several complaints today. She does question medication for neuralgia related to a dental issue/  Right maxillary pain.   Will start rinses, nasal sprays and complete Sinus CT.   Start Rafita med rinse. Rinse one time daily. (obtain over the counter)  Start Flonase 2 sprays to each nostril daily.   Complete Sinus CT.   Cough likely related to post nasal drainage possible irritation from exposures at work.   Maintain good water intake. Limit caffeine.   Follow reflux precautions.     She has continued complaints throughout the visit regarding her neck discomfort. Discussed positioning/ posture at work.   Physical therapy referral for neck.      Return in 1 month for re-evaluation.   Reassured scope was normal. No laryngeal mass noted.       Marylin Montejo PA-C  ENT  [facility], [place]  [phone]

## 2020-07-10 ENCOUNTER — OFFICE VISIT (OUTPATIENT)
Dept: OTOLARYNGOLOGY | Facility: OTHER | Age: 55
End: 2020-07-10
Attending: PHYSICIAN ASSISTANT
Payer: COMMERCIAL

## 2020-07-10 VITALS
WEIGHT: 110 LBS | DIASTOLIC BLOOD PRESSURE: 66 MMHG | HEIGHT: 64 IN | BODY MASS INDEX: 18.78 KG/M2 | TEMPERATURE: 97.4 F | OXYGEN SATURATION: 98 % | HEART RATE: 86 BPM | SYSTOLIC BLOOD PRESSURE: 104 MMHG

## 2020-07-10 DIAGNOSIS — J01.11 ACUTE RECURRENT FRONTAL SINUSITIS: ICD-10-CM

## 2020-07-10 DIAGNOSIS — R05.9 COUGH: Primary | ICD-10-CM

## 2020-07-10 DIAGNOSIS — R09.82 POST-NASAL DRIP: ICD-10-CM

## 2020-07-10 DIAGNOSIS — J32.9 RECURRENT SINUSITIS: ICD-10-CM

## 2020-07-10 DIAGNOSIS — R51.9 FACIAL PAIN: ICD-10-CM

## 2020-07-10 DIAGNOSIS — M54.2 CERVICAL PAIN: ICD-10-CM

## 2020-07-10 DIAGNOSIS — J30.9 ALLERGIC RHINITIS, UNSPECIFIED SEASONALITY, UNSPECIFIED TRIGGER: ICD-10-CM

## 2020-07-10 PROCEDURE — 99214 OFFICE O/P EST MOD 30 MIN: CPT | Mod: 25 | Performed by: PHYSICIAN ASSISTANT

## 2020-07-10 PROCEDURE — 31575 DIAGNOSTIC LARYNGOSCOPY: CPT | Performed by: PHYSICIAN ASSISTANT

## 2020-07-10 RX ORDER — FLUTICASONE PROPIONATE 50 MCG
1 SPRAY, SUSPENSION (ML) NASAL DAILY
Qty: 16 G | Refills: 1 | Status: SHIPPED | OUTPATIENT
Start: 2020-07-10 | End: 2020-07-21

## 2020-07-10 RX ORDER — FLUTICASONE PROPIONATE 50 MCG
2 SPRAY, SUSPENSION (ML) NASAL DAILY
Qty: 2 G | Refills: 3 | Status: SHIPPED | OUTPATIENT
Start: 2020-07-10 | End: 2020-12-18

## 2020-07-10 ASSESSMENT — PAIN SCALES - GENERAL: PAINLEVEL: MODERATE PAIN (5)

## 2020-07-10 ASSESSMENT — MIFFLIN-ST. JEOR: SCORE: 1083.96

## 2020-07-10 NOTE — PATIENT INSTRUCTIONS
Start Rafita med rinse. Rinse one time daily. (obtain over the counter)  Start Flonase 2 sprays to each nostril daily.     Complete Sinus CT.   Maintain good water intake. Limit caffeine.   Follow reflux precautions.     Physical therapy referral for neck.      Thank you for allowing Marylin Montejo PA-C and our ENT team to participate in your care.  If your medications are too expensive, please give the nurse a call.  We can possibly change this medication.  If you have a scheduling or an appointment question please contact our Health Unit Coordinator at their direct line [phone].   ALL nursing questions or concerns can be directed to your ENT nurse at: [phone] [place]    -Obtain Rafita Med Sinus rinse over the counter.    -Use warm distilled water and 2 packets of the salt solution that comes with the bottle, dissolve in bottle up to the 240 mL blanche.  -Irrigate each side of your nose leaning over the sink, using 1/3 to 1/2 the volume of the bottle in each nostril every irrigation.  Irrigate 2 times daily.

## 2020-07-10 NOTE — NURSING NOTE
"Chief Complaint   Patient presents with     Cough     Pt has been referred by Dr. Rosas for a cough.--The issue started last september, states that she also has pain in her neck       Initial /66 (Cuff Size: Adult Regular)   Pulse 86   Temp 97.4  F (36.3  C) (Tympanic)   Ht 1.626 m (5' 4\")   Wt 49.9 kg (110 lb)   SpO2 98%   BMI 18.88 kg/m   Estimated body mass index is 18.88 kg/m  as calculated from the following:    Height as of this encounter: 1.626 m (5' 4\").    Weight as of this encounter: 49.9 kg (110 lb).  Medication Reconciliation: complete  Jacqueline Valdez LPN    "

## 2020-07-10 NOTE — LETTER
7/10/2020         RE: Katherine Royal  [address]  [place] [address]        Dear Colleague,    Thank you for referring your patient, Katherine Royal, [facility] Please see a copy of my visit note below.    Otolaryngology Consultation    Patient: Katherine Royal  : 1965    Patient presents with:  Cough: Pt has been referred by Dr. Rosas for a cough.      HPI:  Katherine Royal is a 54 year old female seen today for cough.   Patient has noticed ongoing issues for 1 year of varying coughing. She was seen by Dr. Rosas on 20 and noted concerns for nasal congestion, rhinorrhea, sore throat, facial pain, cough. She had tried AH without relief.   She was treated with Levaquin, Cleocin, Flonase and Prilosec for GERD.   She presents to ENT for ongoing issues regarding cough,   Neuralgia along her right maxillary.   She has pain along her gums- She had upper dental pain that cleared with dental extraction. She had noted that pain has resolved overall. She does have some pain remaining. Her gums appear normal at her recent dental visit.   No prior sinus surgery.   She has some mild stuffiness, nasal drip. She has no reinaldo post nasal drip.   She has sneezing but no allergic conjunctivitis symptoms.   She has some allergy troubles. No help w/ AH.   She has some tenderness along her lower neck w/ pain.   Feels like itchy, sore throat. She then starts coughing.   Denies dysphagia, dysphonia.   Denies globus sensation.   She had terrible coughing, and pain along her anterior throat.           Patient denies sore throat or throat pain  Denies chronic otalgia.   Denies fevers, night sweats or weight loss.     Denies dysphagia or dysphonia.   Denies globus sensation.     Water- adequate  Caffeine- drinks tea  ETOH- None  Tobacco- None  Reflux- Yes.   She tried Prilosec and protonix with good results.   She feels it is less now.   No concerns with taste .     Resides in a house  with a basement.  There is no water or mold  Carpet in bedroom- No  Heat in home forced air.   Animals dogs  Family hx of allergies - asthma, brother and mother.     Current Outpatient Rx   Medication Sig Dispense Refill     aspirin (EQ ASPIRIN ADULT LOW DOSE) 81 MG EC tablet Take 1 tablet (81 mg) by mouth daily 240 tablet 0     atorvastatin (LIPITOR) 20 MG tablet Take 1 tablet (20 mg) by mouth daily 90 tablet 1     atorvastatin (LIPITOR) 40 MG tablet TAKE 1 TABLET BY MOUTH ONCE DAILY 90 tablet 1     blood glucose (ACCU-CHEK FASTCLIX) lancing device        blood glucose (NO BRAND SPECIFIED) lancets standard Use to test blood sugar 2 times daily or as directed. 100 each 11     blood glucose monitoring (NO BRAND SPECIFIED) test strip Check blood glucose twice daily 100 strip 3     clindamycin (CLEOCIN) 300 MG capsule TAKE 1 CAPSULE BY MOUTH TWICE DAILY FOR 10 DAYS (Patient not taking: Reported on 5/13/2020) 20 capsule 0     cloNIDine (CATAPRES) 0.1 MG tablet TAKE ONE TABLET BY MOUTH AT BEDTIME FOR  ELEVATED  BLOOD  PRESSURE. (Patient not taking: Reported on 5/13/2020) 90 tablet 3     fluticasone (FLONASE) 50 MCG/ACT nasal spray Spray 1 spray into both nostrils daily (Patient not taking: Reported on 5/13/2020) 11 g 1     hydrochlorothiazide (HYDRODIURIL) 12.5 MG tablet TAKE 1 TABLET BY MOUTH ONCE DAILY 90 tablet 1     levofloxacin (LEVAQUIN) 500 MG tablet Take 1 tablet (500 mg) by mouth daily (Patient not taking: Reported on 5/13/2020) 10 tablet 1     lisinopril (PRINIVIL/ZESTRIL) 10 MG tablet TAKE 1 TABLET BY MOUTH ONCE DAILY 90 tablet 1     losartan (COZAAR) 50 MG tablet Take 1.5 tablets (75 mg) by mouth daily (Patient not taking: Reported on 5/13/2020) 135 tablet 3     metFORMIN (GLUCOPHAGE) 1000 MG tablet TAKE 1 TABLET BY MOUTH TWICE DAILY WITH MEALS 60 tablet 0     mupirocin (BACTROBAN) 2 % cream Apply topically 3 times daily (Patient not taking: Reported on 5/13/2020) 30 g 0     mupirocin (BACTROBAN) 2 % external  "ointment APPLY  OINTMENT TOPICALLY TO AFFECTED AREA THREE TIMES DAILY 22 g 0     omeprazole (PRILOSEC) 40 MG DR capsule TAKE 1 CAPSULE BY MOUTH ONCE DAILY 30-60  MINUTES  BEFORE  A  MEAL 30 capsule 3     pantoprazole (PROTONIX) 20 MG EC tablet Take 2 tablets (40 mg) by mouth daily (Patient not taking: Reported on 5/13/2020) 30 tablet 0     pantoprazole (PROTONIX) 40 MG EC tablet Take 1 tablet (40 mg) by mouth daily (Patient not taking: Reported on 5/13/2020) 30 tablet 1     daoexxmwf-nypmndgw-ZK (BROMFED DM) 30-2-10 MG/5ML syrup Take 5 mLs by mouth 4 times daily as needed (Cough and sinusitis) 473 mL 1     vitamin B complex with vitamin C (VITAMIN  B COMPLEX) TABS tablet Take 1 tablet by mouth daily 100 tablet 3       Allergies: Patient has no known allergies.     No past medical history on file.    Past Surgical History:   Procedure Laterality Date     APPENDECTOMY       LAPAROSCOPY DIAGNOSTIC (GYN) N/A 5/10/2017    Procedure: LAPAROSCOPY DIAGNOSTIC (GYN);  DIAGNOSTIC LAPAROSCOPY, WITH LEFT SALPINGO-OOPHORECTOMY,FULGERATION OF ENDOMETRIOSIS, LYSIS OF ADHESIONS;  Surgeon: Misael Vance MD;  Location: [facility] OR       ENT family history reviewed    Social History     Tobacco Use     Smoking status: Never Smoker     Smokeless tobacco: Never Used   Substance Use Topics     Alcohol use: No     Drug use: No       Review of Systems  ROS: 10 point ROS neg other than the symptoms noted above in the HPI     Physical Exam  /66 (Cuff Size: Adult Regular)   Pulse 86   Temp 97.4  F (36.3  C) (Tympanic)   Ht 1.626 m (5' 4\")   Wt 49.9 kg (110 lb)   SpO2 98%   BMI 18.88 kg/m      General - The patient is well nourished and well developed, and appears to have good nutritional status.  Alert and oriented to person and place, answers questions and cooperates with examination appropriately.   Head and Face - Normocephalic and atraumatic, with no gross asymmetry noted.  The facial nerve is intact, with strong symmetric " movements.  Voice and Breathing - The patient was breathing comfortably without the use of accessory muscles. There was no wheezing, stridor, or stertor.  The patients voice was clear and strong, and had appropriate pitch and quality.  Ears -The external auditory canals are patent, the tympanic membranes are intact without effusion, retraction or mass.  Bony landmarks are intact.  Eyes - Extraocular movements intact, and the pupils were reactive to light.  Sclera were not icteric or injected, conjunctiva were pink and moist.  Mouth - Examination of the oral cavity showed pink, healthy oral mucosa. No lesions or ulcerations noted.  The tongue was mobile and midline,  Upper plate.   Throat - The walls of the oropharynx were smooth, pink, moist, symmetric, and had no lesions or ulcerations.  The tonsillar pillars and soft palate were symmetric.  The uvula was midline on elevation.    Neck - Normal midline excursion of the laryngotracheal complex during swallowing.  Full range of motion on passive movement.  Palpation of the occipital, submental, submandibular, internal jugular chain, and supraclavicular nodes did not demonstrate any abnormal lymph nodes or masses.  Palpation of the thyroid was soft and smooth, with no nodules or goiter appreciated.  The trachea was mobile and midline.  Nose - External contour is symmetric, no gross deflection or scars.  Nasal mucosa is pink and moist with no abnormal mucus.   Neck- She has tenderness along posterior cervical spine along SCM and superior to sternal notch. ROm intact.   Flexible Endoscopy -     Attempts at mirror laryngoscopy were not possible due to gag reflex.  Therefore I proceeded with a fiberoptic examination.  First I sprayed both sides of the nose with a mixture of lidocaine and neosynephrine.  I then passed the scope through the nasal cavity. The nasal cavity was overall clear. No polyps. She did have clear rhinorrhea throughout and extending along posterior  pharynx.  The nasopharynx was mucosally covered and symmetric.  The Eustachian tube openings were unobstructed.  Going further down I had a clear view of the base of tongue which had normal appearing lingual tonsillar tissue.  The base of tongue was free of lesions, and the vallecula was open.  The epiglottis was smooth and mucosally covered.  The supraglottic larynx was then clearly visualized.  The vocal cords moved smoothly and symmetrically, they were pearly white and no lesions were seen.  The pyriform sinuses were open, and the limited view of the postcricoid region did not show any lesions.          ASSESSMENT:    ICD-10-CM    1. Cough  R05 OTOLARYNGOLOGY REFERRAL     fluticasone (FLONASE) 50 MCG/ACT nasal spray     CT Sinus w/o Contrast   2. Allergic rhinitis, unspecified seasonality, unspecified trigger  J30.9 fluticasone (FLONASE) 50 MCG/ACT nasal spray   3. Post-nasal drip  R09.82    4. Cervical pain  M54.2 PHYSICAL THERAPY REFERRAL   5. Facial pain  R51 CT Sinus w/o Contrast   6. Recurrent sinusitis  J32.9 CT Sinus w/o Contrast         She has several complaints today. She does question medication for neuralgia related to a dental issue/  Right maxillary pain.   Will start rinses, nasal sprays and complete Sinus CT.   Start Rafita med rinse. Rinse one time daily. (obtain over the counter)  Start Flonase 2 sprays to each nostril daily.   Complete Sinus CT.   Cough likely related to post nasal drainage possible irritation from exposures at work.   Maintain good water intake. Limit caffeine.   Follow reflux precautions.     She has continued complaints throughout the visit regarding her neck discomfort. Discussed positioning/ posture at work.   Physical therapy referral for neck.      Return in 1 month for re-evaluation.   Reassured scope was normal. No laryngeal mass noted.       Marylin Montejo PA-C  ENT  [facility], [place]  [phone]      Again, thank you for allowing me to participate in the care of  your patient.        Sincerely,        Marylin Montejo PA-C

## 2020-07-17 ENCOUNTER — HOSPITAL ENCOUNTER (OUTPATIENT)
Dept: PHYSICAL THERAPY | Facility: HOSPITAL | Age: 55
Setting detail: THERAPIES SERIES
End: 2020-07-17
Attending: PHYSICIAN ASSISTANT
Payer: COMMERCIAL

## 2020-07-17 ENCOUNTER — HOSPITAL ENCOUNTER (OUTPATIENT)
Dept: CT IMAGING | Facility: HOSPITAL | Age: 55
Discharge: HOME | End: 2020-07-17
Attending: PHYSICIAN ASSISTANT | Admitting: PHYSICIAN ASSISTANT
Payer: COMMERCIAL

## 2020-07-17 DIAGNOSIS — R51.9 FACIAL PAIN: ICD-10-CM

## 2020-07-17 DIAGNOSIS — M54.2 CERVICAL PAIN: ICD-10-CM

## 2020-07-17 DIAGNOSIS — R05.9 COUGH: ICD-10-CM

## 2020-07-17 DIAGNOSIS — J32.9 RECURRENT SINUSITIS: ICD-10-CM

## 2020-07-17 PROCEDURE — 97161 PT EVAL LOW COMPLEX 20 MIN: CPT | Mod: GP | Performed by: PHYSICAL THERAPIST

## 2020-07-17 PROCEDURE — 97110 THERAPEUTIC EXERCISES: CPT | Mod: GP | Performed by: PHYSICAL THERAPIST

## 2020-07-17 PROCEDURE — 70486 CT MAXILLOFACIAL W/O DYE: CPT | Mod: TC

## 2020-07-20 NOTE — PROGRESS NOTES
"   07/17/20 1421   General Information   Type of Visit Initial OP Ortho PT Evaluation   Start of Care Date 07/17/20   Referring Physician Marylin Montejo PA-C   Patient/Family Goals Statement fix the crackling of her neck, fix the pain in front of her neck   Orders Evaluate and Treat   Orders Comment neck pain   Date of Order 07/10/20   Medical Diagnosis cervical pain   Surgical/Medical history reviewed Yes   Body Part(s)   Body Part(s) Cervical Spine   Presentation and Etiology   Pertinent history of current problem (include personal factors and/or comorbidities that impact the POC) Pt states since she started working at [employer] she has been having pain in the front of her neck near her collar bones when she coughs and states coughing started when she starte working at [employer].  Pt states the pain is only present if she touches it, does not hurt if she moves and does not hurt her at all.  Pt states if she moves her head around she also feels a clunk at times in the back of her neck.  Pt states the pain in the back of her neck feels more like stiffness.  The pain in the back of her neck does not wake her up at night or keep her from doing things.  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  Pt states she gets 15 minute breaks every 2 hours.   Impairments A. Pain;K. Numbness;L. Tingling   Functional Limitations perform required work activities   Symptom Location neck   How/Where did it occur From insidious onset   Onset date of current episode/exacerbation 07/10/20  (date of MD order)   Chronicity New   Pain rating (0-10 point scale) Best (/10);Worst (/10)   Best (/10) 0   Worst (/10) 5   Pain quality A. Sharp   Frequency of pain/symptoms B. Intermittent   Pain/symptoms are: Other   Pain symptoms " "comment activity dependent   Pain/symptoms exacerbated by G. Certain positions;L. Work tasks   Pain/symptoms eased by E. Changing positions   Progression of symptoms since onset: Unchanged   Prior Level of Function   Prior Level of Function-Mobility independent   Prior Level of Function-ADLs independent   Current Level of Function   Patient role/employment history A. Employed   Employment Comments [employer]    Living environment [facility]   Fall Risk Screen   Fall screen completed by PT   Have you fallen 2 or more times in the past year? No   Have you fallen and had an injury in the past year? No   Is patient a fall risk? No   Cervical Spine   Observation no acute distress   Posture slight forward head with rounded shoulders   Cervical Flexion ROM full   Cervical Extension ROM full   Cervical Right Side Bending ROM full   Cervical Left Side Bending ROM full   Cervical Right Rotation ROM full   Cervical Left Rotation ROM full   Shoulder AROM Screen WNL throughout   Cervical/Thoracic/Shoulder ROM Comments Reports \"tightness\" at end ranges of cervical ROM    Shoulder Shrug (C2-C4) Strength 5/5   Shoulder Abd (C5) Strength 5/5   Shoulder Add (C7) Strength 5/5   Shoulder ER (C5, C6) Strength 5/5   Shoulder IR (C5, C6) Strength 5/5   Scalene Flexibility tightness present   Pectoralis Minor Flexibility tightness present   Spurling Test negative   Cervical Rotation/Lateral Flexion Test negative   Neer Impingement Test negative   Palpation no significant tenderness noted to palpation   Planned Therapy Interventions   Planned Therapy Interventions joint mobilization;manual therapy;strengthening;stretching;neuromuscular re-education   Clinical Impression   Criteria for Skilled Therapeutic Interventions Met yes, treatment indicated   PT Diagnosis neck pain with prolonged postures   Influenced by the following impairments pain, impaired posture with postural weakness   Functional limitations due to impairments decreased tolerance " for prolonged postures   Clinical Presentation Stable/Uncomplicated   Clinical Presentation Rationale clinical judgement   Clinical Decision Making (Complexity) Low complexity   Therapy Frequency 1 time/week   Predicted Duration of Therapy Intervention (days/wks) 90 days   Risk & Benefits of therapy have been explained Yes   Patient, Family & other staff in agreement with plan of care Yes   Clinical Impression Comments Pt presents with complaints of anterior neck pain when coughing or with palpation only, otherwise does not give her pain.  Pt also reports posterior neck pain that is present at work, states it feels like it gets stiff and if she moves it around it will crackle then the pain subsides.  Pt does have to remain in forward flexed posture looking down while completing work duties for 10 hours/day.  Pt demonstrates impaired posture with postural weakness as well as anterior muscle tightness.  Pt unable to attend regular PT due to distance from PT clinic, did discuss finding PT location closer to home however pt would like to just start with HEP at this time.   Pt will call back if HEP not working and can consider transferring care closer to home.  Pt would benefit from skilled PT to work on muscle tightness, weakness, and improving postural awareness.   Education Assessment   Preferred Learning Style Listening   Barriers to Learning Language   ORTHO GOALS   PT Ortho Eval Goals 1;2;3   Ortho Goal 1   Goal Identifier STG 1   Goal Description Pt to be independent and compliant with HEP.   Target Date 08/07/20   Ortho Goal 2   Goal Identifier LTG 1   Goal Description Pt to report at least 50% improvement in neck stiffness and pain.   Target Date 10/15/20   Ortho Goal 3   Goal Identifier LTG 2   Goal Description Pt to tolerate full work shift with pain 1/10 throughout demonstrating improved postural awareness.   Target Date 10/15/20   Total Evaluation Time   PT Eval, Low Complexity Minutes (31171) 32

## 2020-07-21 RX ORDER — FLUTICASONE PROPIONATE 50 MCG
2 SPRAY, SUSPENSION (ML) NASAL DAILY
Qty: 16 G | Refills: 3 | Status: SHIPPED | OUTPATIENT
Start: 2020-07-21 | End: 2020-12-18

## 2020-07-23 ENCOUNTER — TELEPHONE (OUTPATIENT)
Dept: OTOLARYNGOLOGY | Facility: OTHER | Age: 55
End: 2020-07-23

## 2020-07-23 NOTE — TELEPHONE ENCOUNTER
Patient will likely have some discomfort in her throat/ neck following physical therapy. No reinaldo indication for abx at this time.   She may use OTC NSAIDs if she can take for pain/ discomfort.   If she develops new symptoms- she should be seen or the OTC does not alleviate her symptoms.  TR

## 2020-07-23 NOTE — TELEPHONE ENCOUNTER
10:56 AM    Reason for Call: Phone Call    Description: pt states she still has pain in her throat after going to therapy and would like an antibiotic/please advise     Was an appointment offered for this call? No  If yes : Appointment type              Date    Preferred method for responding to this message: Telephone Call  What is your phone number ?[phone]    If we cannot reach you directly, may we leave a detailed response at the number you provided? Yes    Can this message wait until your PCP/provider returns, if available today? YES, No, provider is in    Shanae Quezada

## 2020-08-13 ENCOUNTER — TELEPHONE (OUTPATIENT)
Dept: FAMILY MEDICINE | Facility: OTHER | Age: 55
End: 2020-08-13

## 2020-08-13 NOTE — TELEPHONE ENCOUNTER
Patient requesting something for her nerds. A supplement or something. She is very hard to understand on the phone and she spelled it out as NERDS. Please contact patient to discuss. Ashley LeChevalier LPN

## 2020-08-14 ENCOUNTER — TELEPHONE (OUTPATIENT)
Dept: OTOLARYNGOLOGY | Facility: OTHER | Age: 55
End: 2020-08-14

## 2020-08-14 NOTE — TELEPHONE ENCOUNTER
Pt is requesting something for nerve pain in bilateral hands/feet. Would like this sent to [facility] in [place]

## 2020-08-14 NOTE — TELEPHONE ENCOUNTER
Pt calling and would like to change her time on upcoming appt with Isaac to around 1pm      Please call her at [phone]

## 2020-08-14 NOTE — TELEPHONE ENCOUNTER
I do not have a neuropathy diagnosis for her? Is this something that she has discussed with primary provider in the past? If not, needs visit to discuss to ensure we prescribe appropriately    Montserrat Jimenez MD

## 2020-08-14 NOTE — TELEPHONE ENCOUNTER
Angle sanchezry, I am not aware of what the NERDs is referring to. Can we get more information regarding her symptoms? Gerd?     Montserrat Jimenez MD

## 2020-08-18 NOTE — TELEPHONE ENCOUNTER
Pt reports she hasn't been dx with neuropathy also wants to discuss b vitamin with PCP and nerve pain. Pt then stated she will call back later does not wish schedule.     Next 5 appointments (look out 90 days)    Sep 09, 2020  8:30 AM CDT  (Arrive by 8:15 AM)  SHORT with Delbert Rosas MD  [facility] ([facility] ) [address] [place] [address]  [phone]   Sep 11, 2020  1:45 PM CDT  (Arrive by 1:30 PM)  Return Visit with Marylin Montejo PA-C  [facility] ([facility] [place] ) [address] [place] [address]  [phone]   Sep 18, 2020  2:00 PM CDT  (Arrive by 1:45 PM)  Office Visit with Misael Vance MD  [facility] [place] ([facility] [place] ) [address] [place] [address]  [phone]

## 2020-08-21 ENCOUNTER — TELEPHONE (OUTPATIENT)
Dept: FAMILY MEDICINE | Facility: OTHER | Age: 55
End: 2020-08-21

## 2020-08-21 NOTE — TELEPHONE ENCOUNTER
Patient called requesting prescription for azithromycin for throat. Patient states she has seen PCP and ENT in recent history for throat issues. And states she has been requesting antibiotic for issues.  Nurse offered to have patient talk with ENT or be scheduled with a covering provider.  Patient declines wanting to see another provider and requests message be sent to primary. Patient informed Dr. Rosas would not be in the office until Monday. Patient verbalized understanding.  Nurse informed patient if symptoms worsened to be seen in UC/ED for evaluation. Patient verbalized understanding. Pharmacy pended.

## 2020-08-24 NOTE — TELEPHONE ENCOUNTER
Pt called again regarding abx. She was advised that she needs an office visit. Pt states that she cannot come in due to work schedule. Pt advised that another message would be sent to PCP.

## 2020-08-27 ENCOUNTER — TELEPHONE (OUTPATIENT)
Dept: FAMILY MEDICINE | Facility: OTHER | Age: 55
End: 2020-08-27

## 2020-08-27 DIAGNOSIS — E11.9 TYPE 2 DIABETES MELLITUS WITHOUT COMPLICATION, UNSPECIFIED WHETHER LONG TERM INSULIN USE (H): ICD-10-CM

## 2020-08-27 DIAGNOSIS — I10 BENIGN ESSENTIAL HYPERTENSION: ICD-10-CM

## 2020-08-27 RX ORDER — LISINOPRIL 10 MG/1
10 TABLET ORAL DAILY
Qty: 90 TABLET | Refills: 1 | Status: SHIPPED | OUTPATIENT
Start: 2020-08-27 | End: 2020-09-30

## 2020-08-27 NOTE — TELEPHONE ENCOUNTER
Patient called requesting metformin and lisinopril be refilled at [facility].

## 2020-08-27 NOTE — TELEPHONE ENCOUNTER
metformin      Last Written Prescription Date:  11/22/19  Last Fill Quantity: 60,   # refills: 0  Last Office Visit: 5/13/20  Future Office visit:

## 2020-08-31 NOTE — NURSING NOTE
"Chief Complaint   Patient presents with     Pain     follow up       Initial /78 (BP Location: Left arm, Patient Position: Chair, Cuff Size: Adult Regular)  Pulse 82  Temp 97.4  F (36.3  C) (Tympanic)  Resp 16  Ht 5' 4\" (1.626 m)  Wt 143 lb (64.9 kg)  SpO2 98%  BMI 24.55 kg/m2 Estimated body mass index is 24.55 kg/(m^2) as calculated from the following:    Height as of this encounter: 5' 4\" (1.626 m).    Weight as of this encounter: 143 lb (64.9 kg).  Medication Reconciliation: complete   Patricia Varma LPN      "
Patient seen and examined independently. I agree with the resident's note, physical exam, and plan except as below.  Vital Signs Last 24 Hrs  T(C): 37.1 (01 Sep 2020 07:30), Max: 37.7 (31 Aug 2020 14:04)  T(F): 98.7 (01 Sep 2020 07:30), Max: 99.9 (31 Aug 2020 14:04)  HR: 81 (01 Sep 2020 10:22) (66 - 87)  BP: 188/87 (01 Sep 2020 10:22) (150/67 - 225/93)  BP(mean): --  RR: 18 (01 Sep 2020 09:11) (18 - 18)  SpO2: 99% (01 Sep 2020 09:11) (95% - 99%)  PE  nad  obese  [id number]  ctabl  softntnd+bs  hypophonic/dysarthria  RLE 0/5, hyperextended on ankle   LLE 0/5  2/5 UEs      #acute CVA  < from: CT Head No Cont (09.01.20 @ 09:10) >    Redemonstration of evolving acute/subacute infarcts involving the left basal ganglia/corona radiata with mild mass effect. Slightly increased rightward midline shift measuring 9 mm (previously measured 7 mm).    No evidence of intracranial hemorrhage, hydrocephalus, or extra-axial fluid collection..    < end of copied text >    on asa at home - plavix added  statin 80mg  stroke unit  echo  MRI/A pending     RR this AM for AMS/vomiting - no bleed on CTH , BP elevated  given hydralazine IV with improvement  permissive hypertension 140-180  npo - speech ans swallow     hx of CIPD- on IVIG follows with Dr arredondo    #[employer] - insulin coverage while npo if fs>180  avoid hypoglycemia     #toxic- metabolic encephalopathy vs due to progression of CVA  EEG  utox  neuro follow up      spoke with wife over phone - updated and all qs answered  full code - GOC discussed

## 2020-09-22 ENCOUNTER — TRANSFERRED RECORDS (OUTPATIENT)
Dept: HEALTH INFORMATION MANAGEMENT | Facility: CLINIC | Age: 55
End: 2020-09-22

## 2020-09-22 ENCOUNTER — OFFICE VISIT (OUTPATIENT)
Dept: OTOLARYNGOLOGY | Facility: OTHER | Age: 55
End: 2020-09-22
Attending: OTOLARYNGOLOGY
Payer: COMMERCIAL

## 2020-09-22 DIAGNOSIS — K21.9 LPRD (LARYNGOPHARYNGEAL REFLUX DISEASE): Primary | ICD-10-CM

## 2020-09-22 PROCEDURE — G0463 HOSPITAL OUTPT CLINIC VISIT: HCPCS

## 2020-09-22 NOTE — PROGRESS NOTES
Outpatient Physical Therapy Discharge Note     Patient: Katherine Royal  : 1965    Beginning/End Dates of Reporting Period:  2020 to 2020    Referring Provider: Marylin Montejo PA-C    Therapy Diagnosis: neck pain with prolonged postures     Client Self Report: Pt seen for eval only    Objective Measurements:      Outcome Measures (most recent score):      Goals:  Goal Identifier STG 1   Goal Description Pt to be independent and compliant with HEP.   Target Date 20   Date Met      Progress: not met - no follow up     Goal Identifier LTG 1   Goal Description Pt to report at least 50% improvement in neck stiffness and pain.   Target Date 10/15/20   Date Met      Progress: not met - no follow up     Goal Identifier LTG 2   Goal Description Pt to tolerate full work shift with pain 1/10 throughout demonstrating improved postural awareness.   Target Date 10/15/20   Date Met      Progress: not met - no follow up       Progress Toward Goals:   Pt was seen for initial evaluation and at that time stated she would not be able to return for additional treatments due to distance from home.  Provided basic HEP to patient and instructed her to call in 1 week to follow up and at that time would assist with getting her set up with PT closer to her home.  Pt failed to make follow up call to assist with transitioning care.          Plan:  Discharge from therapy.    Discharge:    Reason for Discharge: Patient has failed to schedule further appointments.    Equipment Issued:     Discharge Plan: no plan established

## 2020-09-30 DIAGNOSIS — E78.2 MIXED HYPERLIPIDEMIA: ICD-10-CM

## 2020-09-30 DIAGNOSIS — E11.9 TYPE 2 DIABETES MELLITUS WITHOUT COMPLICATION, UNSPECIFIED WHETHER LONG TERM INSULIN USE (H): ICD-10-CM

## 2020-09-30 DIAGNOSIS — I10 BENIGN ESSENTIAL HYPERTENSION: ICD-10-CM

## 2020-09-30 RX ORDER — HYDROCHLOROTHIAZIDE 12.5 MG/1
12.5 TABLET ORAL DAILY
Qty: 90 TABLET | Refills: 1 | Status: SHIPPED | OUTPATIENT
Start: 2020-09-30 | End: 2021-03-25

## 2020-09-30 RX ORDER — ATORVASTATIN CALCIUM 20 MG/1
20 TABLET, FILM COATED ORAL DAILY
Qty: 90 TABLET | Refills: 1 | Status: SHIPPED | OUTPATIENT
Start: 2020-09-30 | End: 2021-04-26

## 2020-09-30 RX ORDER — LISINOPRIL 10 MG/1
10 TABLET ORAL DAILY
Qty: 90 TABLET | Refills: 1 | Status: SHIPPED | OUTPATIENT
Start: 2020-09-30 | End: 2021-04-09

## 2020-09-30 NOTE — TELEPHONE ENCOUNTER
Patient called for fills    atorvastatin (LIPITOR) 20 MG tablet       Last Written Prescription Date:  5/27/20  Last Fill Quantity: 90,   # refills: 1  Last Office Visit: 5/13/20  Future Office visit:    Next 5 appointments (look out 90 days)    Nov 20, 2020  2:30 PM CST  (Arrive by 2:15 PM)  SHORT with Delbert [facility]as MD  [facility] ([facility] ) [address]  [facility] [address]  [phone]   Nov 20, 2020  3:00 PM CST  (Arrive by 2:45 PM)  Office Visit with Misael Vnace MD  [facility] ([facility] ) [address] [address]  [phone]           Routing refill request to provider for review/approval because:  Drug not on the [facility], [facility] or [facility] refill protocol or controlled substance    metFORMIN (GLUCOPHAGE) 1000 MG tablet       Last Written Prescription Date:  8/27/20  Last Fill Quantity: 180,   # refills: 1  Last Office Visit: 5/13/20  Future Office visit:    Next 5 appointments (look out 90 days)    Nov 20, 2020  2:30 PM CST  (Arrive by 2:15 PM)  SHORT with Delbert Rosas MD  [facility] ([facility] ) [address] [facility] [address]  [phone]   Nov 20, 2020  3:00 PM CST  (Arrive by 2:45 PM)  Office Visit with Misael Vance MD  [facility] ([facility] ) [address] [address]  [phone]           Routing refill request to provider for review/approval because:  Drug not on the [facility], [facility] or [facility] refill protocol or controlled substance    lisinopril (ZESTRIL) 10 MG tablet      Last Written Prescription Date:  8/27/20  Last Fill Quantity: 90,   # refills: 1  Last Office Visit: 5/13/20  Future Office visit:    Next 5 appointments (look out 90 days)    Nov 20, 2020  2:30 PM CST  (Arrive by 2:15 PM)  SHORT with Delbert Rosas MD  [facility] ([facility]  [place] ) [address]  [place] [address]  [phone]   Nov 20, 2020  3:00 PM CST  (Arrive by 2:45 PM)  Office Visit with Misael Vance MD  [facility] [place] ([facility] [place] ) [address]  [place] [address]  [phone]           Routing refill request to provider for review/approval because:  Drug not on the [facility], [facility] or [facility] refill protocol or controlled substance      hydrochlorothiazide (HYDRODIURIL) 12.5 MG tablet       Last Written Prescription Date:  8/28/19  Last Fill Quantity: 90,   # refills: 1  Last Office Visit: 5/13/20  Future Office visit:    Next 5 appointments (look out 90 days)    Nov 20, 2020  2:30 PM CST  (Arrive by 2:15 PM)  SHORT with Delbert Rosas MD  [facility] [place] ([facility] [place] ) [address]  [place] [address]  [phone]   Nov 20, 2020  3:00 PM CST  (Arrive by 2:45 PM)  Office Visit with Misael Vance MD  [facility] [place] ([facility] [place] ) [address]  [place] [address]  [phone]           Routing refill request to provider for review/approval because:  Drug not on the G, P or  Health refill protocol or controlled substance

## 2020-09-30 NOTE — PROGRESS NOTES
document embedded image  Patient Name: Katherine Royal    Address: [address] [place] [address]     YOB: 1965    [place] [address]    MR Number: [id number]    Phone: [phone]  PCP: Delbert Rosas MD            Appointment Date: 09/22/20   Visit Provider: Jet Melgar MD    cc: Delbert Rosas MD; ~    ENT Progress Note    Visit Reasons: Sinus Issues / Throat / CT [place]    HPI  History of Present Illness  Chief complaint:  Throat use    History  The patient is a 55-year-old female who comes to the office today with chronic throat irritation and cough.  She had a CT scan obtained here in [place] that is available for review today.  I see no evidence of sinus disease that would be contributing to her throat symptoms.  She does have a long history of reflux.  She is having breakthrough reflux in spite of taking Nexium intermittently.    Exam  Nasal-her septum is midline anteriorly.  There is some boggy nasal membranes no purulence or polyps  Oral cavity oropharynx-free of mucosal lesions or inflammation  Indirect laryngoscopy-there is severe laryngeal erythema and erythema of her hypopharynx, I see no evidence of neoplasm..  There is no evidence of neurologic deficit.  Her symptoms would seem most consistent with reflux.  Neck-no masses or adenopathy  Head and neck integument-Clear  General the patient appears well and in no distress Neuro-there are no focal cranial nerve deficit line CT-I personally reviewed her CT and from [place].  She has no frontal sinus development.  Her paranasal sinuses are clear.  She has mildly boggy nasal membranes.    Home Medications     - Last Reconciled 09/23/20 by Digna Diego CMA    atorvastatin 20 mg tablet  20 mg PO DAILY  metformin 1,000 mg tablet  1,000 mg PO BID  omeprazole 20 mg capsule,delayed release  20 mg PO BID 3 months  omeprazole 20 mg capsule,delayed release  20 mg PO DAILY    Allergies    seafood Allergy (Unverified 09/23/20  11:06)  Unknown    PFSH  PFSH:     Medical History (Reviewed 09/23/20 @ 11:06 by Digna Diego CMA)    Diabetes  Ear pressure  HTN (hypertension)    Surgical History (Reviewed 09/23/20 @ 11:07 by Digna Diego CMA)    Hx of appendectomy    Family History (Reviewed 09/23/20 @ 11:07 by Digna Diego CMA)  Other  Asthma  Diabetes mellitus  Hypertension  Stroke       Social History (Updated 09/23/20 @ 11:07 by Digna Diego CMA)  Smoking Status:  Never smoker       A&P  Assessment & Plan  (1) Laryngopharyngeal reflux:        Status: Acute        Code(s):  K21.9 - Gastro-esophageal reflux disease without esophagitis  Additional Plan Details  Plan Details: We will place her on omeprazole 20 mg b.i.d. for 3 months.  She should refrain from all caffeine intake.  She was counseled not to eat or drink for 3 hours before lying down at night.  Departure  Other Medications:        New:    omeprazole 20 mg PO BID 3 months 180 caps 0RF            Jet Melgar MD    09/22/20 1411    <Electronically signed by Jet Melgar MD> 09/30/20 1033

## 2020-12-15 ENCOUNTER — TELEPHONE (OUTPATIENT)
Dept: FAMILY MEDICINE | Facility: OTHER | Age: 55
End: 2020-12-15

## 2020-12-15 NOTE — TELEPHONE ENCOUNTER
Patient calling wondering if she can move her appointment from 01/08/2020 to 12/18/2020. Only opening that day is a 1:30 DrRandall hagan. Informed patient that Dr. Rosas's schedule is full that day but a message can be sent to see if she can be fit in that day. Please advise.

## 2020-12-18 ENCOUNTER — OFFICE VISIT (OUTPATIENT)
Dept: FAMILY MEDICINE | Facility: OTHER | Age: 55
End: 2020-12-18
Attending: FAMILY MEDICINE
Payer: COMMERCIAL

## 2020-12-18 ENCOUNTER — OFFICE VISIT (OUTPATIENT)
Dept: OBGYN | Facility: OTHER | Age: 55
End: 2020-12-18
Attending: OBSTETRICS & GYNECOLOGY
Payer: COMMERCIAL

## 2020-12-18 VITALS
WEIGHT: 120 LBS | BODY MASS INDEX: 20.49 KG/M2 | DIASTOLIC BLOOD PRESSURE: 62 MMHG | HEIGHT: 64 IN | OXYGEN SATURATION: 97 % | SYSTOLIC BLOOD PRESSURE: 100 MMHG | HEART RATE: 93 BPM

## 2020-12-18 VITALS
WEIGHT: 120 LBS | BODY MASS INDEX: 20.6 KG/M2 | SYSTOLIC BLOOD PRESSURE: 96 MMHG | OXYGEN SATURATION: 99 % | DIASTOLIC BLOOD PRESSURE: 68 MMHG | HEART RATE: 102 BPM | TEMPERATURE: 98.2 F

## 2020-12-18 DIAGNOSIS — N80.9 ENDOMETRIOSIS: ICD-10-CM

## 2020-12-18 DIAGNOSIS — I10 BENIGN ESSENTIAL HYPERTENSION: ICD-10-CM

## 2020-12-18 DIAGNOSIS — M79.2 NEURALGIA: ICD-10-CM

## 2020-12-18 DIAGNOSIS — Z12.4 SCREENING FOR CERVICAL CANCER: Primary | ICD-10-CM

## 2020-12-18 DIAGNOSIS — K21.9 GASTROESOPHAGEAL REFLUX DISEASE WITHOUT ESOPHAGITIS: ICD-10-CM

## 2020-12-18 DIAGNOSIS — E11.9 TYPE 2 DIABETES MELLITUS WITHOUT COMPLICATION, UNSPECIFIED WHETHER LONG TERM INSULIN USE (H): Primary | ICD-10-CM

## 2020-12-18 LAB
ALBUMIN SERPL-MCNC: 4 G/DL (ref 3.4–5)
ALP SERPL-CCNC: 80 U/L (ref 40–150)
ALT SERPL W P-5'-P-CCNC: 28 U/L (ref 0–50)
ANION GAP SERPL CALCULATED.3IONS-SCNC: 8 MMOL/L (ref 3–14)
AST SERPL W P-5'-P-CCNC: 16 U/L (ref 0–45)
BASOPHILS # BLD AUTO: 0.1 10E9/L (ref 0–0.2)
BASOPHILS NFR BLD AUTO: 1 %
BILIRUB SERPL-MCNC: 0.3 MG/DL (ref 0.2–1.3)
BUN SERPL-MCNC: 19 MG/DL (ref 7–30)
CALCIUM SERPL-MCNC: 9.3 MG/DL (ref 8.5–10.1)
CHLORIDE SERPL-SCNC: 106 MMOL/L (ref 94–109)
CHOLEST SERPL-MCNC: 180 MG/DL
CO2 SERPL-SCNC: 28 MMOL/L (ref 20–32)
CREAT SERPL-MCNC: 0.62 MG/DL (ref 0.52–1.04)
CREAT UR-MCNC: 46 MG/DL
DIFFERENTIAL METHOD BLD: NORMAL
EOSINOPHIL # BLD AUTO: 0.1 10E9/L (ref 0–0.7)
EOSINOPHIL NFR BLD AUTO: 2.2 %
ERYTHROCYTE [DISTWIDTH] IN BLOOD BY AUTOMATED COUNT: 12.2 % (ref 10–15)
EST. AVERAGE GLUCOSE BLD GHB EST-MCNC: 126 MG/DL
GFR SERPL CREATININE-BSD FRML MDRD: >90 ML/MIN/{1.73_M2}
GLUCOSE SERPL-MCNC: 114 MG/DL (ref 70–99)
HBA1C MFR BLD: 6 % (ref 0–5.6)
HCT VFR BLD AUTO: 40.7 % (ref 35–47)
HDLC SERPL-MCNC: 86 MG/DL
HGB BLD-MCNC: 13.6 G/DL (ref 11.7–15.7)
IMM GRANULOCYTES # BLD: 0 10E9/L (ref 0–0.4)
IMM GRANULOCYTES NFR BLD: 0.2 %
LDLC SERPL CALC-MCNC: 75 MG/DL
LYMPHOCYTES # BLD AUTO: 1.7 10E9/L (ref 0.8–5.3)
LYMPHOCYTES NFR BLD AUTO: 33.5 %
MCH RBC QN AUTO: 29.9 PG (ref 26.5–33)
MCHC RBC AUTO-ENTMCNC: 33.4 G/DL (ref 31.5–36.5)
MCV RBC AUTO: 90 FL (ref 78–100)
MICROALBUMIN UR-MCNC: <5 MG/L
MICROALBUMIN/CREAT UR: NORMAL MG/G CR (ref 0–25)
MONOCYTES # BLD AUTO: 0.3 10E9/L (ref 0–1.3)
MONOCYTES NFR BLD AUTO: 6.7 %
NEUTROPHILS # BLD AUTO: 2.9 10E9/L (ref 1.6–8.3)
NEUTROPHILS NFR BLD AUTO: 56.4 %
NONHDLC SERPL-MCNC: 94 MG/DL
NRBC # BLD AUTO: 0 10*3/UL
NRBC BLD AUTO-RTO: 0 /100
PLATELET # BLD AUTO: 247 10E9/L (ref 150–450)
POTASSIUM SERPL-SCNC: 4.2 MMOL/L (ref 3.4–5.3)
PROT SERPL-MCNC: 7.3 G/DL (ref 6.8–8.8)
RBC # BLD AUTO: 4.55 10E12/L (ref 3.8–5.2)
SODIUM SERPL-SCNC: 142 MMOL/L (ref 133–144)
TRIGL SERPL-MCNC: 95 MG/DL
URATE SERPL-MCNC: 6.2 MG/DL (ref 2.6–6)
WBC # BLD AUTO: 5.1 10E9/L (ref 4–11)

## 2020-12-18 PROCEDURE — 80053 COMPREHEN METABOLIC PANEL: CPT | Performed by: FAMILY MEDICINE

## 2020-12-18 PROCEDURE — 87624 HPV HI-RISK TYP POOLED RSLT: CPT | Performed by: OBSTETRICS & GYNECOLOGY

## 2020-12-18 PROCEDURE — 99214 OFFICE O/P EST MOD 30 MIN: CPT | Performed by: FAMILY MEDICINE

## 2020-12-18 PROCEDURE — 80061 LIPID PANEL: CPT | Performed by: FAMILY MEDICINE

## 2020-12-18 PROCEDURE — 36415 COLL VENOUS BLD VENIPUNCTURE: CPT | Performed by: FAMILY MEDICINE

## 2020-12-18 PROCEDURE — 85025 COMPLETE CBC W/AUTO DIFF WBC: CPT | Performed by: FAMILY MEDICINE

## 2020-12-18 PROCEDURE — 84550 ASSAY OF BLOOD/URIC ACID: CPT | Performed by: FAMILY MEDICINE

## 2020-12-18 PROCEDURE — 83036 HEMOGLOBIN GLYCOSYLATED A1C: CPT | Performed by: FAMILY MEDICINE

## 2020-12-18 PROCEDURE — 99213 OFFICE O/P EST LOW 20 MIN: CPT | Performed by: OBSTETRICS & GYNECOLOGY

## 2020-12-18 PROCEDURE — 82043 UR ALBUMIN QUANTITATIVE: CPT | Performed by: FAMILY MEDICINE

## 2020-12-18 PROCEDURE — G0123 SCREEN CERV/VAG THIN LAYER: HCPCS | Performed by: OBSTETRICS & GYNECOLOGY

## 2020-12-18 RX ORDER — ATORVASTATIN CALCIUM 40 MG/1
40 TABLET, FILM COATED ORAL DAILY
COMMUNITY
Start: 2020-05-01 | End: 2021-04-09

## 2020-12-18 RX ORDER — OMEPRAZOLE 40 MG/1
CAPSULE, DELAYED RELEASE ORAL
Qty: 30 CAPSULE | Refills: 3 | Status: SHIPPED | OUTPATIENT
Start: 2020-12-18 | End: 2021-04-09

## 2020-12-18 RX ORDER — GABAPENTIN 300 MG/1
300 CAPSULE ORAL 3 TIMES DAILY
Qty: 90 CAPSULE | Refills: 1 | Status: SHIPPED | OUTPATIENT
Start: 2020-12-18

## 2020-12-18 ASSESSMENT — PATIENT HEALTH QUESTIONNAIRE - PHQ9: SUM OF ALL RESPONSES TO PHQ QUESTIONS 1-9: 2

## 2020-12-18 ASSESSMENT — PAIN SCALES - GENERAL
PAINLEVEL: SEVERE PAIN (7)
PAINLEVEL: SEVERE PAIN (7)

## 2020-12-18 ASSESSMENT — MIFFLIN-ST. JEOR: SCORE: 1124.32

## 2020-12-18 NOTE — LETTER
December 21, 2020      Katherine Royal  [address] [facility] [facility] [place] [address]        Dear ,    We are writing to inform you of your test results.    Your test results fall within the expected range(s) or remain unchanged from previous results.  Please continue with current treatment plan.    Resulted Orders   Hemoglobin A1c   Result Value Ref Range    Hemoglobin A1C 6.0 (H) 0 - 5.6 %      Comment:      Normal <5.7% Prediabetes 5.7-6.4%  Diabetes 6.5% or higher - adopted from ADA   consensus guidelines.     Albumin Random Urine Quantitative with Creat Ratio   Result Value Ref Range    Creatinine Urine 46 mg/dL    Albumin Urine mg/L <5 mg/L    Albumin Urine mg/g Cr Unable to calculate due to low value 0 - 25 mg/g Cr   CBC with platelets differential   Result Value Ref Range    WBC 5.1 4.0 - 11.0 10e9/L    RBC Count 4.55 3.8 - 5.2 10e12/L    Hemoglobin 13.6 11.7 - 15.7 g/dL    Hematocrit 40.7 35.0 - 47.0 %    MCV 90 78 - 100 fl    MCH 29.9 26.5 - 33.0 pg    MCHC 33.4 31.5 - 36.5 g/dL    RDW 12.2 10.0 - 15.0 %    Platelet Count 247 150 - 450 10e9/L    Diff Method Automated Method     % Neutrophils 56.4 %    % Lymphocytes 33.5 %    % Monocytes 6.7 %    % Eosinophils 2.2 %    % Basophils 1.0 %    % Immature Granulocytes 0.2 %    Nucleated RBCs 0 0 /100    Absolute Neutrophil 2.9 1.6 - 8.3 10e9/L    Absolute Lymphocytes 1.7 0.8 - 5.3 10e9/L    Absolute Monocytes 0.3 0.0 - 1.3 10e9/L    Absolute Eosinophils 0.1 0.0 - 0.7 10e9/L    Absolute Basophils 0.1 0.0 - 0.2 10e9/L    Abs Immature Granulocytes 0.0 0 - 0.4 10e9/L    Absolute Nucleated RBC 0.0    Comprehensive metabolic panel   Result Value Ref Range    Sodium 142 133 - 144 mmol/L    Potassium 4.2 3.4 - 5.3 mmol/L    Chloride 106 94 - 109 mmol/L    Carbon Dioxide 28 20 - 32 mmol/L    Anion Gap 8 3 - 14 mmol/L    Glucose 114 (H) 70 - 99 mg/dL    Urea Nitrogen 19 7 - 30 mg/dL    Creatinine 0.62 0.52 - 1.04 mg/dL    GFR Estimate >90 >60 mL/min/[1.73_m2]       Comment:      Non  GFR Calc  Starting 12/18/2018, serum creatinine based estimated GFR (eGFR) will be   calculated using the Chronic Kidney Disease Epidemiology Collaboration   (CKD-EPI) equation.      GFR Estimate If Black >90 >60 mL/min/[1.73_m2]      Comment:       GFR Calc  Starting 12/18/2018, serum creatinine based estimated GFR (eGFR) will be   calculated using the Chronic Kidney Disease Epidemiology Collaboration   (CKD-EPI) equation.      Calcium 9.3 8.5 - 10.1 mg/dL    Bilirubin Total 0.3 0.2 - 1.3 mg/dL    Albumin 4.0 3.4 - 5.0 g/dL    Protein Total 7.3 6.8 - 8.8 g/dL    Alkaline Phosphatase 80 40 - 150 U/L    ALT 28 0 - 50 U/L    AST 16 0 - 45 U/L   Uric acid   Result Value Ref Range    Uric Acid 6.2 (H) 2.6 - 6.0 mg/dL   Lipid Profile   Result Value Ref Range    Cholesterol 180 <200 mg/dL    Triglycerides 95 <150 mg/dL    HDL Cholesterol 86 >49 mg/dL    LDL Cholesterol Calculated 75 <100 mg/dL      Comment:      Desirable:       <100 mg/dl    Non HDL Cholesterol 94 <130 mg/dL   Estimated Average Glucose   Result Value Ref Range    Estimated Average Glucose 126 mg/dL       If you have any questions or concerns, please call the clinic at the number listed above.       Sincerely,      Delbert Rosas MD

## 2020-12-18 NOTE — PROGRESS NOTES
S:  F/u endometriosis.  Due for Pap smear.  54 yo f with h/o endometriosis.  She is s/p laparoscopy, LSO 2017.   She is now menopausal, not on hrt and minimal vasomotor sx.  Denies VB.  Occasional LLQ pain (chronic).  No other complaints.  LPS 2016 nml.  No other pelvic complaints.  Mammo scheduled and colonoscopy UTD.           Patient Active Problem List   Diagnosis     Type 2 diabetes mellitus without complication, non insulin requiring (H)     HTN (hypertension)     Perimenopausal     Kidney stone on left side     Primary insomnia     Gastroesophageal reflux disease, esophagitis presence not specified     Oropharyngeal dysphagia          No past medical history on file.         Past Surgical History:   Procedure Laterality Date     APPENDECTOMY       LAPAROSCOPY DIAGNOSTIC (GYN) N/A 5/10/2017    Procedure: LAPAROSCOPY DIAGNOSTIC (GYN);  DIAGNOSTIC LAPAROSCOPY, WITH LEFT SALPINGO-OOPHORECTOMY,FULGERATION OF ENDOMETRIOSIS, LYSIS OF ADHESIONS;  Surgeon: Misael Vance MD;  Location: [facility]            Social History     Tobacco Use     Smoking status: Never Smoker     Smokeless tobacco: Never Used   Substance Use Topics     Alcohol use: No            Family History   Problem Relation Age of Onset     Diabetes Mother      Hypertension Mother      Diabetes Father              No Known Allergies         Current Outpatient Medications   Medication Sig Dispense Refill     aspirin (EQ ASPIRIN ADULT LOW DOSE) 81 MG EC tablet Take 1 tablet (81 mg) by mouth daily 240 tablet 0     atorvastatin (LIPITOR) 20 MG tablet Take 1 tablet (20 mg) by mouth daily 90 tablet 1     atorvastatin (LIPITOR) 40 MG tablet Take 40 mg by mouth daily       blood glucose (ACCU-CHEK FASTCLIX) lancing device        blood glucose (NO BRAND SPECIFIED) lancets standard Use to test blood sugar 2 times daily or as directed. 100 each 11     blood glucose monitoring (NO BRAND SPECIFIED) test strip Check blood glucose twice daily 100 strip 3     cloNIDine  "(CATAPRES) 0.1 MG tablet TAKE ONE TABLET BY MOUTH AT BEDTIME FOR  ELEVATED  BLOOD  PRESSURE. 90 tablet 3     gabapentin (NEURONTIN) 300 MG capsule Take 1 capsule (300 mg) by mouth 3 times daily 90 capsule 1     hydrochlorothiazide (HYDRODIURIL) 12.5 MG tablet Take 1 tablet (12.5 mg) by mouth daily 90 tablet 1     lisinopril (ZESTRIL) 10 MG tablet Take 1 tablet (10 mg) by mouth daily 90 tablet 1     metFORMIN (GLUCOPHAGE) 1000 MG tablet Take 1 tablet (1,000 mg) by mouth 2 times daily (with meals) 180 tablet 1     mupirocin (BACTROBAN) 2 % cream Apply topically 3 times daily 30 g 0     omeprazole (PRILOSEC) 40 MG DR capsule TAKE 1 CAPSULE BY MOUTH ONCE DAILY 30-60  MINUTES  BEFORE  A  MEAL 30 capsule 3     vitamin B complex with vitamin C (VITAMIN  B COMPLEX) TABS tablet Take 1 tablet by mouth daily 100 tablet 3          Review Of Systems  Constitutional:  Denies fever  GI/ negative except as noted per hpi    O:   /62 (BP Location: Left arm, Cuff Size: Adult Regular)   Pulse 93   Ht 1.626 m (5' 4\")   Wt 54.4 kg (120 lb)   SpO2 97%   BMI 20.60 kg/m    Gen:  NAD, A and O    Abd soft, NT, ND  Lymphadenopathy:  neg  Vulva: No lesions, erythema, BUS wnl  Vagina: Pink, atrophic,no lesions  Cervix: No lesions, no CMT  Uterus: Midposition, normal size and count our, mobile, NT  Adnexa: Non-palpable, NT, no masses  Anus without lesions  Ext.  neg      A:  Screening pep/pelvic exam.  H/o endometriosis now menopausal.     P:   Pt has my card and phone number to call as needed if problems in the interim or she does not hear her results. Otherwise reassured endometriosis essentially cured by menopause and no further f/u necessary.  Routine Pap/HPV Q 3-5yrs.     "

## 2020-12-18 NOTE — NURSING NOTE
"Chief Complaint   Patient presents with     Follow Up       Initial /62 (BP Location: Left arm, Cuff Size: Adult Regular)   Pulse 93   Ht 1.626 m (5' 4\")   Wt 54.4 kg (120 lb)   SpO2 97%   BMI 20.60 kg/m   Estimated body mass index is 20.6 kg/m  as calculated from the following:    Height as of this encounter: 1.626 m (5' 4\").    Weight as of this encounter: 54.4 kg (120 lb).  Medication Reconciliation: complete  Latoya Borjas LPN  "

## 2020-12-18 NOTE — LETTER
December 28, 2020          Katherine Royal  [address]  [place] [address]        Dear Katherine,    .        Thank you for coming to the [facility]. This letter is to inform you that your pap test and HPV was normal. Please call the nurse at [phone], if you have questions pertaining to your results.               Sincerely,          Misael Vance MD/ Isabelle TAM

## 2020-12-18 NOTE — NURSING NOTE
"Chief Complaint   Patient presents with     Diabetes     Other     gastro issues        Initial BP 96/68 (BP Location: Right arm, Patient Position: Sitting, Cuff Size: Adult Regular)   Pulse 102   Temp 98.2  F (36.8  C)   Wt 54.4 kg (120 lb)   SpO2 99%   BMI 20.60 kg/m   Estimated body mass index is 20.6 kg/m  as calculated from the following:    Height as of 7/10/20: 1.626 m (5' 4\").    Weight as of this encounter: 54.4 kg (120 lb).  Medication Reconciliation: complete  Meenakshi Palomares  "

## 2020-12-21 ENCOUNTER — TELEPHONE (OUTPATIENT)
Dept: FAMILY MEDICINE | Facility: OTHER | Age: 55
End: 2020-12-21

## 2020-12-21 NOTE — TELEPHONE ENCOUNTER
Prior Authorization Retail Medication Request  [facility] KEY# [id number]    Medication/Dose: OMEPRAZOLE 40MG DR CAPSULES  ICD code (if different than what is on RX):    Previously Tried and Failed:    Rationale:      Insurance Name:    Insurance ID:        Pharmacy Information (if different than what is on RX)  Name:    Phone:

## 2020-12-21 NOTE — TELEPHONE ENCOUNTER
Pt states she took the gabapentin twice and it caused dizziness and blurred vision. She is requesting a different rx for her nerve pain. She uses [facility] in [place]

## 2020-12-22 NOTE — TELEPHONE ENCOUNTER
This medication is not covered for pt age. Insurance plan covered PPI's for patients who are 12 years of age or younger. Since pt is over this age, this medication is considered a plan exclusion benefit and a pa is not available.

## 2020-12-22 NOTE — TELEPHONE ENCOUNTER
Pt updated on below.She is not going to take the Neurontin d/t side effects.    Please note.      Sofi Lyles RN

## 2020-12-23 LAB
COPATH REPORT: NORMAL
PAP: NORMAL

## 2020-12-24 LAB
FINAL DIAGNOSIS: NORMAL
HPV HR 12 DNA CVX QL NAA+PROBE: NEGATIVE
HPV16 DNA SPEC QL NAA+PROBE: NEGATIVE
HPV18 DNA SPEC QL NAA+PROBE: NEGATIVE
SPECIMEN DESCRIPTION: NORMAL
SPECIMEN SOURCE CVX/VAG CYTO: NORMAL

## 2020-12-28 ENCOUNTER — TELEPHONE (OUTPATIENT)
Dept: FAMILY MEDICINE | Facility: OTHER | Age: 55
End: 2020-12-28

## 2020-12-28 DIAGNOSIS — M79.2 NEURALGIA: Primary | ICD-10-CM

## 2020-12-28 RX ORDER — DULOXETIN HYDROCHLORIDE 20 MG/1
20 CAPSULE, DELAYED RELEASE ORAL 2 TIMES DAILY
Qty: 60 CAPSULE | Refills: 1 | Status: SHIPPED | OUTPATIENT
Start: 2020-12-28 | End: 2021-04-09

## 2020-12-28 NOTE — TELEPHONE ENCOUNTER
Pt stated that gabapentin is not working for her. Side effects are too bad dizzy, blurry vision and nauseated. Paramaphy said to try duloxetine as an alternative   [facility] pharmacy

## 2021-02-20 DIAGNOSIS — E11.9 TYPE 2 DIABETES MELLITUS WITHOUT COMPLICATION, UNSPECIFIED WHETHER LONG TERM INSULIN USE (H): ICD-10-CM

## 2021-02-22 NOTE — TELEPHONE ENCOUNTER
Pt calling and needs to  Wednesday.    Metformin  Last Written Prescription Date:  9.30.2020  Last Fill Quantity: 180,   # refills: 1  Last Office Visit: 12.18.2020  Future Office visit:       Routing refill request to provider for review/approval because:  Pt request.      Lab Results   Component Value Date    A1C 6.0 12/18/2020    A1C 6.1 05/22/2020    A1C 6.6 10/01/2019    A1C 6.3 06/25/2019    A1C 6.5 02/08/2019         Sofi Lyles RN

## 2021-03-25 DIAGNOSIS — I10 BENIGN ESSENTIAL HYPERTENSION: ICD-10-CM

## 2021-03-25 RX ORDER — HYDROCHLOROTHIAZIDE 12.5 MG/1
TABLET ORAL
Qty: 90 TABLET | Refills: 0 | Status: SHIPPED | OUTPATIENT
Start: 2021-03-25 | End: 2021-05-25

## 2021-03-25 NOTE — TELEPHONE ENCOUNTER
Hydrochlorothizide      Last Written Prescription Date:  09/30/20  Last Fill Quantity: 90,   # refills: 1  Last Office Visit: 12/18/20  Future Office visit:    Next 5 appointments (look out 90 days)    Apr 09, 2021  2:00 PM  (Arrive by 1:45 PM)  SHORT with Delbert Rosas MD  [facility] [place] [facility] [address]  [place] [address]  [phone]           Routing refill request to provider for review/approval because:  Telephone call    PCP Dr. Rosas

## 2021-04-06 NOTE — PROGRESS NOTES
Assessment & Plan     Type 2 diabetes mellitus without complication, unspecified whether long term insulin use (H)  Fasting labs are reviewed.  Goal of hemoglobin A1C of less than 7 discussed.  Instructed in the importance of diet, exercise, and good glycemic control in prevention of secondary complications.    Continue same medication regimen. Repeat fasting glucose and hemoglobin A1C in 6 months.    - Hemoglobin A1c  - Vitamin D Deficiency; Future  - metFORMIN (GLUCOPHAGE) 1000 MG tablet; TAKE 1 TABLET BY MOUTH TWICE DAILY WITH MEALS  - Vitamin D Deficiency  - Estimated Average Glucose    HTN (hypertension)  Goals reviewed.  Continue home BP monitoring and same medication regimen.  Follow up 6 months.   - lisinopril (ZESTRIL) 10 MG tablet; Take 1 tablet (10 mg) by mouth daily  - cloNIDine (CATAPRES) 0.1 MG tablet; TAKE ONE TABLET BY MOUTH AT BEDTIME FOR  ELEVATED  BLOOD  PRESSURE.    Gastroesophageal reflux disease without esophagitis  Discussed EGD in light of recurrent symptoms and need for proton pump inhibitor   - omeprazole (PRILOSEC) 40 MG DR capsule; TAKE 1 CAPSULE BY MOUTH ONCE DAILY 30-60  MINUTES  BEFORE  A  MEAL  - GENERAL SURG ADULT REFERRAL    Neuralgia  Refilled as written  - DULoxetine (CYMBALTA) 20 MG capsule; Take 1 capsule (20 mg) by mouth 2 times daily    See Patient Instructions    No follow-ups on file.    Delbert Rosas MD  [facility] KRISTEN Murphy is a 55 year old who presents for the following health issues     HPI     Diabetes Follow-up    How often are you checking your blood sugar? One time daily  What time of day are you checking your blood sugars (select all that apply)?  fasting am  Have you had any blood sugars above 200?  No  Have you had any blood sugars below 70?  No    What symptoms do you notice when your blood sugar is low?  [place]    What concerns do you have today about your diabetes? None     Do you have any of these symptoms? (Select all  "that apply)  Burning in feet, blurry vision at times    Have you had a diabetic eye exam in the last 12 months? No, has appointment scheduled for 04/30/2021        BP Readings from Last 2 Encounters:   04/13/21 102/68   04/09/21 96/62     Hemoglobin A1C (%)   Date Value   04/09/2021 5.9 (H)   12/18/2020 6.0 (H)     LDL Cholesterol Calculated (mg/dL)   Date Value   12/18/2020 75   05/22/2020 61         Hypertension Follow-up      Do you check your blood pressure regularly outside of the clinic? Yes     Are you following a low salt diet? No    Are your blood pressures ever more than 140 on the top number (systolic) OR more   than 90 on the bottom number (diastolic), for example 140/90? No      How many servings of fruits and vegetables do you eat daily?  4 or more    On average, how many sweetened beverages do you drink each day (Examples: soda, juice, sweet tea, etc.  Do NOT count diet or artificially sweetened beverages)?   1    How many days per week do you exercise enough to make your heart beat faster? 4    How many minutes a day do you exercise enough to make your heart beat faster? 30 - 60    How many days per week do you miss taking your medication? 0      Review of Systems   Constitutional, HEENT, cardiovascular, pulmonary, gi and gu systems are negative, except as otherwise noted.      Objective    BP 96/62 (BP Location: Left arm, Patient Position: Sitting, Cuff Size: Adult Regular)   Pulse 84   Temp 96.6  F (35.9  C) (Tympanic)   Resp 20   Ht 1.626 m (5' 4\")   Wt 56.7 kg (125 lb)   SpO2 96%   BMI 21.46 kg/m    Body mass index is 21.46 kg/m .  Physical Exam  Vitals signs and nursing note reviewed.   Constitutional:       General: She is not in acute distress.     Appearance: She is well-developed.   Neurological:      Mental Status: She is alert and oriented to person, place, and time.   Psychiatric:         Mood and Affect: Mood normal.         Behavior: Behavior normal.         Thought Content: " Thought content normal.        Other exam not completed    Office Visit on 12/18/2020   Component Date Value Ref Range Status     PAP 12/18/2020 NIL   Final     Copath Report 12/18/2020    Final                    Value:  Patient Name: MESERET ROMAN  MR#: [id number]  Specimen #: [id number]  Collected: 12/18/2020  Received: 12/22/2020  Reported: 12/23/2020 15:00  Ordering Phy(s): CHANDANA THOMPSON    For improved result formatting, select 'View Enhanced Report Format' under   Linked Documents section.    SPECIMEN/STAIN PROCESS:  Pap thin layer prep screening ([facility])       Pap-Cyto x 2, HPV ordered x 1    SOURCE: Cervical, endocervical  ----------------------------------------------------------------   Pap thin layer prep screening ([facility])  SPECIMEN ADEQUACY:  Satisfactory for evaluation.  -Transitional zone component could not be determined due to atrophy.    CYTOLOGIC INTERPRETATION:    Negative for intraepithelial lesion or malignancy    Electronically signed out by:  SUMMER Smith (ASCP)    CLINICAL HISTORY:    Post Menopausal,    Papanicolaou Test Limitations:  Cervical cytology is a screening test with   limited sensitivity; regular  screening is critical for cancer prevention; Pap tests are                           primarily   effective for the diagnosis/prevention of  squamous cell carcinoma, not adenocarcinomas or other cancers.    COLLECTION SITE:  Client:  [facility]  Location: OB (B)    The technical component of this testing was completed at [facility], with the professional  component performed at [facility], [address] [place] [phone])         HPV Source 12/18/2020 [facility]   Final     HPV 16 DNA 12/18/2020 Negative  NEG^Negative Final     HPV 18 DNA 12/18/2020 Negative  NEG^Negative Final     Other HR HPV 12/18/2020 Negative  NEG^Negative Final     Final Diagnosis 12/18/2020 This patient's sample is negative  for HPV DNA.   Final    Comment: This test was developed and its performance characteristics determined by the   [facility]. It   has not been cleared or approved by the FDA. The laboratory is regulated under   CLIA as qualified to perform high-complexity testing. This test is used for   clinical purposes. It should not be regarded as investigational or for   research.  (Note)  METHODOLOGY:  The Roche chandana 4800 system uses automated extraction,   simultaneous amplification of HPV (L1 region) and beta-globin,    followed by  real time detection of fluorescent labeled HPV and beta   globin using specific oligonucleotide probes . The test specifically   identifies types HPV 16 DNA and HPV 18 DNA while concurrently   detecting the rest of the high risk types (31, 33, 35, 39, 45, 51,   52, 56, 58, 59, 66 or 68).  COMMENTS:  This test is not intended for use as a screening device   for women under age 30 with normal cervical cytology.  Results should   be correl                           ated with cytologic and histologic findings. Close clinical   followup is recommended.       Specimen Description 12/18/2020 Cervical Cells   Final

## 2021-04-09 ENCOUNTER — ANCILLARY PROCEDURE (OUTPATIENT)
Dept: MAMMOGRAPHY | Facility: OTHER | Age: 56
End: 2021-04-09
Attending: FAMILY MEDICINE
Payer: COMMERCIAL

## 2021-04-09 ENCOUNTER — OFFICE VISIT (OUTPATIENT)
Dept: FAMILY MEDICINE | Facility: OTHER | Age: 56
End: 2021-04-09
Attending: FAMILY MEDICINE
Payer: COMMERCIAL

## 2021-04-09 VITALS
TEMPERATURE: 96.6 F | DIASTOLIC BLOOD PRESSURE: 62 MMHG | WEIGHT: 125 LBS | HEIGHT: 64 IN | OXYGEN SATURATION: 96 % | SYSTOLIC BLOOD PRESSURE: 96 MMHG | RESPIRATION RATE: 20 BRPM | BODY MASS INDEX: 21.34 KG/M2 | HEART RATE: 84 BPM

## 2021-04-09 DIAGNOSIS — E11.9 TYPE 2 DIABETES MELLITUS WITHOUT COMPLICATION, UNSPECIFIED WHETHER LONG TERM INSULIN USE (H): Primary | ICD-10-CM

## 2021-04-09 DIAGNOSIS — I10 BENIGN ESSENTIAL HYPERTENSION: ICD-10-CM

## 2021-04-09 DIAGNOSIS — M79.2 NEURALGIA: ICD-10-CM

## 2021-04-09 DIAGNOSIS — Z12.31 VISIT FOR SCREENING MAMMOGRAM: ICD-10-CM

## 2021-04-09 DIAGNOSIS — K21.9 GASTROESOPHAGEAL REFLUX DISEASE WITHOUT ESOPHAGITIS: ICD-10-CM

## 2021-04-09 LAB
EST. AVERAGE GLUCOSE BLD GHB EST-MCNC: 123 MG/DL
HBA1C MFR BLD: 5.9 % (ref 0–5.6)

## 2021-04-09 PROCEDURE — 77067 SCR MAMMO BI INCL CAD: CPT | Mod: TC | Performed by: RADIOLOGY

## 2021-04-09 PROCEDURE — 36415 COLL VENOUS BLD VENIPUNCTURE: CPT | Performed by: FAMILY MEDICINE

## 2021-04-09 PROCEDURE — 83036 HEMOGLOBIN GLYCOSYLATED A1C: CPT | Performed by: FAMILY MEDICINE

## 2021-04-09 PROCEDURE — 99214 OFFICE O/P EST MOD 30 MIN: CPT | Performed by: FAMILY MEDICINE

## 2021-04-09 PROCEDURE — 77063 BREAST TOMOSYNTHESIS BI: CPT | Mod: TC | Performed by: RADIOLOGY

## 2021-04-09 PROCEDURE — 99N1182 PR STATISTIC ESTIMATED AVERAGE GLUCOSE: Performed by: FAMILY MEDICINE

## 2021-04-09 PROCEDURE — 82306 VITAMIN D 25 HYDROXY: CPT | Performed by: FAMILY MEDICINE

## 2021-04-09 RX ORDER — CLONIDINE HYDROCHLORIDE 0.1 MG/1
TABLET ORAL
Qty: 90 TABLET | Refills: 3 | Status: SHIPPED | OUTPATIENT
Start: 2021-04-09 | End: 2021-07-09

## 2021-04-09 RX ORDER — OMEPRAZOLE 40 MG/1
CAPSULE, DELAYED RELEASE ORAL
Qty: 30 CAPSULE | Refills: 3 | Status: SHIPPED | OUTPATIENT
Start: 2021-04-09 | End: 2021-07-09

## 2021-04-09 RX ORDER — DULOXETIN HYDROCHLORIDE 20 MG/1
20 CAPSULE, DELAYED RELEASE ORAL 2 TIMES DAILY
Qty: 60 CAPSULE | Refills: 1 | Status: SHIPPED | OUTPATIENT
Start: 2021-04-09

## 2021-04-09 RX ORDER — LISINOPRIL 10 MG/1
10 TABLET ORAL DAILY
Qty: 90 TABLET | Refills: 1 | Status: SHIPPED | OUTPATIENT
Start: 2021-04-09 | End: 2021-07-09

## 2021-04-09 RX ORDER — ATORVASTATIN CALCIUM 40 MG/1
40 TABLET, FILM COATED ORAL DAILY
Qty: 90 TABLET | Refills: 1 | Status: SHIPPED | OUTPATIENT
Start: 2021-04-09 | End: 2021-04-13

## 2021-04-09 ASSESSMENT — MIFFLIN-ST. JEOR: SCORE: 1147

## 2021-04-09 ASSESSMENT — PAIN SCALES - GENERAL: PAINLEVEL: NO PAIN (0)

## 2021-04-09 NOTE — LETTER
April 12, 2021      Katherine Royal  [address]  [place] [address]        Dear ,    We are writing to inform you of your test results.    Your test results fall within the expected range(s) or remain unchanged from previous results.  Please continue with current treatment plan.    Resulted Orders   Hemoglobin A1c   Result Value Ref Range    Hemoglobin A1C 5.9 (H) 0 - 5.6 %      Comment:      Normal <5.7% Prediabetes 5.7-6.4%  Diabetes 6.5% or higher - adopted from ADA   consensus guidelines.     Estimated Average Glucose   Result Value Ref Range    Estimated Average Glucose 123 mg/dL       If you have any questions or concerns, please call the clinic at the number listed above.       Sincerely,      Delbert Rosas MD

## 2021-04-09 NOTE — LETTER
April 13, 2021      Katherine Royal  [address] [place] [address]        Dear ,    We are writing to inform you of your test results.    Your test results fall within the expected range(s) or remain unchanged from previous results.  Please continue with current treatment plan.    Resulted Orders   Hemoglobin A1c   Result Value Ref Range    Hemoglobin A1C 5.9 (H) 0 - 5.6 %      Comment:      Normal <5.7% Prediabetes 5.7-6.4%  Diabetes 6.5% or higher - adopted from ADA   consensus guidelines.     Vitamin D Deficiency   Result Value Ref Range    Vitamin D Deficiency screening 64 20 - 75 ug/L      Comment:      Season, race, dietary intake, and treatment affect the concentration of   25-hydroxy-Vitamin D. Values may decrease during winter months and increase   during summer months. Values 20-29 ug/L may indicate Vitamin D insufficiency   and values <20 ug/L may indicate Vitamin D deficiency.  Vitamin D determination is routinely performed by an immunoassay specific for   25 hydroxyvitamin D3.  If an individual is on vitamin D2 (ergocalciferol)   supplementation, please specify 25 OH vitamin D2 and D3 level determination by   LCMSMS test VITD23.     Estimated Average Glucose   Result Value Ref Range    Estimated Average Glucose 123 mg/dL       If you have any questions or concerns, please call the clinic at the number listed above.       Sincerely,      Delbert Rosas MD

## 2021-04-09 NOTE — NURSING NOTE
"Chief Complaint   Patient presents with     Diabetes       Initial BP 96/62 (BP Location: Left arm, Patient Position: Sitting, Cuff Size: Adult Regular)   Pulse 84   Temp 96.6  F (35.9  C) (Tympanic)   Resp 20   Ht 1.626 m (5' 4\")   Wt 56.7 kg (125 lb)   SpO2 96%   BMI 21.46 kg/m   Estimated body mass index is 21.46 kg/m  as calculated from the following:    Height as of this encounter: 1.626 m (5' 4\").    Weight as of this encounter: 56.7 kg (125 lb).  Medication Reconciliation: complete  Patricia Varma LPN  "

## 2021-04-12 ENCOUNTER — TELEPHONE (OUTPATIENT)
Dept: FAMILY MEDICINE | Facility: OTHER | Age: 56
End: 2021-04-12

## 2021-04-12 NOTE — TELEPHONE ENCOUNTER
[facility] Omeprazole 40mg dr capsules. Submitted on CMM. Received an instant response stating that this medication is not covered for patient's age. Forms scanned to Epic.

## 2021-04-13 ENCOUNTER — PREP FOR PROCEDURE (OUTPATIENT)
Dept: SURGERY | Facility: OTHER | Age: 56
End: 2021-04-13

## 2021-04-13 ENCOUNTER — OFFICE VISIT (OUTPATIENT)
Dept: SURGERY | Facility: OTHER | Age: 56
End: 2021-04-13
Attending: FAMILY MEDICINE
Payer: COMMERCIAL

## 2021-04-13 VITALS
RESPIRATION RATE: 16 BRPM | OXYGEN SATURATION: 99 % | SYSTOLIC BLOOD PRESSURE: 102 MMHG | TEMPERATURE: 97.4 F | DIASTOLIC BLOOD PRESSURE: 68 MMHG | HEART RATE: 91 BPM

## 2021-04-13 DIAGNOSIS — R07.0 THROAT PAIN: ICD-10-CM

## 2021-04-13 DIAGNOSIS — R05.9 COUGH: ICD-10-CM

## 2021-04-13 DIAGNOSIS — K92.1 MELENA: Primary | ICD-10-CM

## 2021-04-13 DIAGNOSIS — R10.9 INTERMITTENT ABDOMINAL PAIN: ICD-10-CM

## 2021-04-13 DIAGNOSIS — M54.2 NECK PAIN: ICD-10-CM

## 2021-04-13 DIAGNOSIS — K21.9 GASTROESOPHAGEAL REFLUX DISEASE WITHOUT ESOPHAGITIS: ICD-10-CM

## 2021-04-13 LAB — DEPRECATED CALCIDIOL+CALCIFEROL SERPL-MC: 64 UG/L (ref 20–75)

## 2021-04-13 PROCEDURE — 99214 OFFICE O/P EST MOD 30 MIN: CPT | Performed by: NURSE PRACTITIONER

## 2021-04-13 ASSESSMENT — PAIN SCALES - GENERAL: PAINLEVEL: NO PAIN (0)

## 2021-04-13 NOTE — PATIENT INSTRUCTIONS
We want your Colonoscopy and EGD to be as pleasant as possible. Please review the instructions below. If you have questions, you may contact us at any of the following numbers:     [facility] Health Unit Coordinator: [phone]  Clinic Nurse ([place]): [phone]  Surgery Education Nurse: [phone]      Date of Procedure: 05/27/2021 with Dr. Rubi  Admit time: Surgery Department will call you the day before your procedure by 5pm with your admit time. If your surgery is on Monday, expect a call on Friday.  If you are not contacted by 5PM, please call admitting at [phone].   After hours or on weekends, call [phone] to postpone.   Call the clinic nurse if you become ill within 1 week of your procedure to reschedule.     Preop appointment needed within the 30 days prior to your procedure.   COVID-19 test is needed 4 days before procedure in the morning. This testing is done in the [facility] on the West side of [facility] (weekdays and weekends) or at the [facility] through door #3 (weekdays only).  Follow the signage for parking and bring your mobile phone (if you have one) to call the phone number ([phone]) on the sign outside the testing site for check-in. Stay in your vehicle until you are directed to enter. If you do not have a cell phone, please call the nurse for instructions on checking in.   This has been scheduled for  at  at the  site.      7 DAYS BEFORE THE EXAM:       Fill your prescription(s) at the pharmacy as soon as possible. (call ahead if more than 1 week)  Call the Surgery Education Nurse at [phone] and have a medication list ready.  No Aspirin or NSAIDS (Ibuprofen, Celebrex, Naproxen, etc) 7 days before surgery.   Stop fiber supplements, vitamins, iron and herbals. Do not eat corn, nuts or seeds.  If you are prescribed a daily 81 mg Aspirin, you may continue this. If you are prescribed blood thinners or insulin, talk to your primary care provider for  "instructions on these medications.    2 DAYS BEFORE THE EXAM:        Low fiber diet. See list of low fiber foods on page 3 of \"Split-Dose Golytely\" packet. Nothing red or purple.   Drink 4-6 large glasses of sports drink today and tomorrow.          AT BEDTIME: take 2 Dulcolax tablets.     1 DAY BEFORE THE EXAM:       No solid foods or milk products after 12:01 am. Drink only clear liquids all day.   See list of clear liquids on page 2 of \"Split-Dose Golytely\" packet. No red, purple, or alcohol.         AT 3:00 PM: Take 2 Dulcolax tablets.         AT 6:00 PM: Drink one 8 oz. glass of Golytely every 10-15 minutes until the jug is half empty. Drink each glass quickly. Store the rest in the refrigerator. Stay near a toilet.    DAY OF COLONOSCOPY:                6 HOURS PRIOR TO THE EXAM (set an alarm):  Drink the other half of the Golytely jug-one 8 oz glass every 10-15 minutes until gone.   You may have clear liquids up until 4 hours before your exam and then nothing by mouth.   If you must take medicine, you may take it with a sip of water.   Do not take diabetes medicine by mouth until after surgery.   If you have an inhaler, bring it to surgery.  Shower before arrival and wear clean,comfortable clothes.   No jewelry, make-up, nail polish, hair spray, lotions, or perfumes.   [place] in Admitting through the [facility] Entrance.  You must have a responsible adult available to drive and stay with you for 4 hours at home or you will be cancelled.         TIPS FOR COLON CLEANSING BEFORE YOUR COLONOSCOPY  To get accurate results from your exam, your colon must be completely empty or you may need to repeat the colon prep and exam. If you followed instructions and your stool is clear or yellow liquid, you are ready. If you are not sure if your colon is clean, please call the clinic nurse.    You may use Tucks wipes, hemorrhoid treatments, hydrocortisone cream or alcohol-free baby wipes to ease anal irritation. You may also use " Vaseline to help protect the skin.     You can squeeze some lemon juice or add a packet of Crystal Light lemon-lime or ice tea flavor into your preparation. You may try sucking on hard candy or a lemon or lime wedge; or washing your mouth out with water, clear soda or mouthwash.    To chill the solution, put it in your refrigerator or set it in a bowl of ice. Do not add ice in your glass. Remove from the refrigerator 15 minutes before drinking.    Quickly drink each glass. It may help to use a timer. If the liquid is too salty, use a straw. Even when you are sitting on the toilet, keep drinking every 10-15 minutes. If you have nausea or vomiting, take a break for 15 to 30 minutes and then resume drinking.    You will have loose watery stools and may also have chills. Dress for comfort. Expect to feel bloating, nausea and other discomfort until the stool clears from your colon.

## 2021-04-13 NOTE — NURSING NOTE
"Chief Complaint   Patient presents with     Consult     reflux       Initial /68   Pulse 91   Temp 97.4  F (36.3  C) (Tympanic)   Resp 16   SpO2 99%  Estimated body mass index is 21.46 kg/m  as calculated from the following:    Height as of 4/9/21: 1.626 m (5' 4\").    Weight as of 4/9/21: 56.7 kg (125 lb).  Medication Reconciliation: complete  Marisol Taylor LPN    "

## 2021-04-13 NOTE — PROGRESS NOTES
CLINIC NOTE - CONSULT  4/13/2021    Patient:Katherine Royal  Referring Physician: Dr. Rosas    Reason for Referral: GERD, Melena, sore throat, cough    This is a 55 year old female with a need of an EGD and colonoscopy for GERD, Melena, throat pain, cough     Patient's last colonoscopy was 5 years ago.  She has never had an EGD.  She states she has been by 2 different ENT doctors for her symptoms. She states she has tried numerous medications for her acid reflux without improvement in her symptoms.     Past Medical History:  History reviewed. No pertinent past medical history.    Past Surgical History:  Past Surgical History:   Procedure Laterality Date     APPENDECTOMY       LAPAROSCOPY DIAGNOSTIC (GYN) N/A 5/10/2017    Procedure: LAPAROSCOPY DIAGNOSTIC (GYN);  DIAGNOSTIC LAPAROSCOPY, WITH LEFT SALPINGO-OOPHORECTOMY,FULGERATION OF ENDOMETRIOSIS, LYSIS OF ADHESIONS;  Surgeon: Misael Vance MD;  Location: [facility]       Family History History:  Family History   Problem Relation Age of Onset     Diabetes Mother      Hypertension Mother      Diabetes Father        History of Tobacco Use:  History   Smoking Status     Never Smoker   Smokeless Tobacco     Never Used       Current Medications:  Current Outpatient Medications   Medication Sig Dispense Refill     aspirin (EQ ASPIRIN ADULT LOW DOSE) 81 MG EC tablet Take 1 tablet (81 mg) by mouth daily 240 tablet 0     atorvastatin (LIPITOR) 20 MG tablet Take 1 tablet (20 mg) by mouth daily 90 tablet 1     blood glucose (ACCU-CHEK FASTCLIX) lancing device        blood glucose (NO BRAND SPECIFIED) lancets standard Use to test blood sugar 2 times daily or as directed. 100 each 11     blood glucose monitoring (NO BRAND SPECIFIED) test strip Check blood glucose twice daily 100 strip 3     cloNIDine (CATAPRES) 0.1 MG tablet TAKE ONE TABLET BY MOUTH AT BEDTIME FOR  ELEVATED  BLOOD  PRESSURE. 90 tablet 3     DULoxetine (CYMBALTA) 20 MG capsule Take 1 capsule (20 mg) by mouth 2 times  daily 60 capsule 1     gabapentin (NEURONTIN) 300 MG capsule Take 1 capsule (300 mg) by mouth 3 times daily 90 capsule 1     hydrochlorothiazide (HYDRODIURIL) 12.5 MG tablet Take 1 tablet by mouth once daily 90 tablet 0     lisinopril (ZESTRIL) 10 MG tablet Take 1 tablet (10 mg) by mouth daily 90 tablet 1     metFORMIN (GLUCOPHAGE) 1000 MG tablet TAKE 1 TABLET BY MOUTH TWICE DAILY WITH MEALS 180 tablet 0     mupirocin (BACTROBAN) 2 % cream Apply topically 3 times daily 30 g 0     omeprazole (PRILOSEC) 40 MG DR capsule TAKE 1 CAPSULE BY MOUTH ONCE DAILY 30-60  MINUTES  BEFORE  A  MEAL 30 capsule 3     vitamin B complex with vitamin C (VITAMIN  B COMPLEX) TABS tablet Take 1 tablet by mouth daily 100 tablet 3       Allergies:  No Known Allergies    ROS:  Constitutional: negative  Eyes: negative  Ears, nose, mouth, throat, and face: positive for throat pain, cough  Respiratory: negative  Cardiovascular: negative  Gastrointestinal: positive for melena, GERD  Genitourinary:negative  Integument/breast: negative  Hematologic/lymphatic: negative  Musculoskeletal:  negative  Neurological: negative  Behvioral/Psych: negative  Endocrine: positive for diabetes  Allergic/Immunologic: negative    PHYSICAL EXAM:     Vital signs: /68   Pulse 91   Temp 97.4  F (36.3  C) (Tympanic)   Resp 16   SpO2 99%    BMI: There is no height or weight on file to calculate BMI.   General: Normal, healthy, cooperative, in no acute distress, alert   Skin: no rashes   Lungs: clear to auscultation   CV: Regular rate and rhythm   Abdominal: abdomen is soft without significant tenderness   Extremities: No cyanosis, clubbing or edema noted bilaterally in Upper and Lower Extremities   Neurological: without deficit    ASSESSMENT:      55 year old female with a need of an EGD and colonoscopy for GERD, Melena, throat pain, cough    PLAN:   An EGD and colonoscopy will be scheduled.  The procedure with their risks, benefits and alternatives were  explained.  Risks include but are not limited to bleeding, perforation, missing lesions, need for additional procedures, reaction to anesthesia.  All the patients questions were answered.  The patient consents to proceed as planned.

## 2021-04-24 DIAGNOSIS — E78.2 MIXED HYPERLIPIDEMIA: ICD-10-CM

## 2021-04-26 RX ORDER — ATORVASTATIN CALCIUM 20 MG/1
TABLET, FILM COATED ORAL
Qty: 90 TABLET | Refills: 0 | Status: SHIPPED | OUTPATIENT
Start: 2021-04-26 | End: 2021-07-09

## 2021-04-26 NOTE — TELEPHONE ENCOUNTER
Lipitor 20 mg      Last Written Prescription Date:  9/30/20  Last Fill Quantity: 90,   # refills: 1  Last Office Visit: 4/9/21  Future Office visit:    Next 5 appointments (look out 90 days)    May 23, 2021 10:45 AM  (Arrive by 10:30 AM)  SHORT with [facility] COLLECTION [facility] ([facility] ) [address]  [place] [address]  [phone]   Jul 09, 2021  2:30 PM  (Arrive by 2:15 PM)  SHORT with Delbert Rosas MD  [facility] ([facility] ) [address]  [place] [address]  [phone]           Routing refill request to provider for review/approval because:

## 2021-04-29 ENCOUNTER — NURSE TRIAGE (OUTPATIENT)
Dept: FAMILY MEDICINE | Facility: OTHER | Age: 56
End: 2021-04-29

## 2021-04-29 DIAGNOSIS — K21.9 GASTROESOPHAGEAL REFLUX DISEASE WITHOUT ESOPHAGITIS: Primary | ICD-10-CM

## 2021-04-29 RX ORDER — SUCRALFATE 1 G/1
1 TABLET ORAL 4 TIMES DAILY
Qty: 120 TABLET | Refills: 1 | Status: SHIPPED | OUTPATIENT
Start: 2021-04-29

## 2021-04-29 NOTE — TELEPHONE ENCOUNTER
"Pt of   Pt calling and wanted to talk with . Her upper middle stomach pain continues.The last three weeks it has been constant and seems worse.Worse at night.Per pt her MD knows about the stomach pain and had discussed at last office visit.She thinks she might have an ulcer.She has a colonoscopy and endoscopy set up for May 27th. She is taking Mylanta and Prilosec.Her pain 5/10 at night it is higher. She is askig for more medication until her procedure.Advised ED and she declines. She continued to ask for more medication. She is aware PCP out and will send to covering provider for recommendation.    Please advise      She uses "[employer].    Sofi Lyles RN    .  Reason for Disposition    [1] Pain lasts > 10 minutes AND [2] age > 35 AND [3] at least one cardiac risk factor (i.e., hypertension, diabetes, obesity, smoker or strong family history of heart disease)    Additional Information    Negative: Shock suspected (e.g., cold/pale/clammy skin, too weak to stand, low BP, rapid pulse)    Negative: Difficult to awaken or acting confused (e.g., disoriented, slurred speech)    Negative: Passed out (i.e., lost consciousness, collapsed and was not responding)    Negative: Sounds like a life-threatening emergency to the triager    Pain is mainly in upper abdomen  (if needed ask: \"is it mainly above the belly button?\")    Negative: Severe difficulty breathing (e.g., struggling for each breath, speaks in single words)    Negative: Shock suspected (e.g., cold/pale/clammy skin, too weak to stand, low BP, rapid pulse)    Negative: Difficult to awaken or acting confused (e.g., disoriented, slurred speech)    Negative: Passed out (i.e., lost consciousness, collapsed and was not responding)    Negative: Visible sweat on face or sweat dripping down face    Negative: Sounds like a life-threatening emergency to the triager    Negative: Followed an abdomen (stomach) injury    Negative: Chest " "pain    Answer Assessment - Initial Assessment Questions  1. LOCATION: \"Where does it hurt?\"       Upper middle  2. RADIATION: \"Does the pain shoot anywhere else?\" (e.g., chest, back)     no  3. ONSET: \"When did the pain begin?\" (e.g., minutes, hours or days ago)       2 years now the last 3 weeks at has worsened  4. SUDDEN: \"Gradual or sudden onset?\"     gradula  5. PATTERN \"Does the pain come and go, or is it constant?\"     - If constant: \"Is it getting better, staying the same, or worsening?\"       (Note: Constant means the pain never goes away completely; most serious pain is constant and it progresses)      - If intermittent: \"How long does it last?\" \"Do you have pain now?\"      (Note: Intermittent means the pain goes away completely between bouts)      constant  6. SEVERITY: \"How bad is the pain?\"  (e.g., Scale 1-10; mild, moderate, or severe)    - MILD (1-3): doesn't interfere with normal activities, abdomen soft and not tender to touch     - MODERATE (4-7): interferes with normal activities or awakens from sleep, tender to touch     - SEVERE (8-10): excruciating pain, doubled over, unable to do any normal activities       7-8/10 and now it is about 5/10.At night it is wose  7. RECURRENT SYMPTOM: \"Have you ever had this type of abdominal pain before?\" If so, ask: \"When was the last time?\" and \"What happened that time?\"       Yes sometimes  8. CAUSE: \"What do you think is causing the abdominal pain?\"      ulcer  9. RELIEVING/AGGRAVATING FACTORS: \"What makes it better or worse?\" (e.g., movement, antacids, bowel movement)      unsure  10. OTHER SYMPTOMS: \"Has there been any vomiting, diarrhea, constipation, or urine problems?\"       Sometimes diarrhea if she takes fiber  11. PREGNANCY: \"Is there any chance you are pregnant?\" \"When was your last menstrual period?\"        no    Protocols used: ABDOMINAL PAIN - UPPER-A-AH, ABDOMINAL PAIN - FEMALE-A-AH      "

## 2021-04-29 NOTE — TELEPHONE ENCOUNTER
She is being scheduled for EGD and omeprazole was started, I have sent in Carafate 1 gram to take 1/2 hour before meals and bedtime, to [facility] in [place]. continue omeprazole, follow up in ER if pain is severe.

## 2021-05-18 ENCOUNTER — TELEPHONE (OUTPATIENT)
Dept: SURGERY | Facility: OTHER | Age: 56
End: 2021-05-18

## 2021-05-18 DIAGNOSIS — Z12.11 COLON CANCER SCREENING: Primary | ICD-10-CM

## 2021-05-18 RX ORDER — BISACODYL 5 MG
TABLET, DELAYED RELEASE (ENTERIC COATED) ORAL
Qty: 4 TABLET | Refills: 0 | Status: SHIPPED | OUTPATIENT
Start: 2021-05-18

## 2021-05-18 RX ORDER — BISACODYL 5 MG
TABLET, DELAYED RELEASE (ENTERIC COATED) ORAL
Qty: 4 TABLET | Refills: 0 | Status: CANCELLED | OUTPATIENT
Start: 2021-05-18

## 2021-05-18 NOTE — TELEPHONE ENCOUNTER
Patient called & has some questions regarding the covid test, the prep medicine. Please call back at [phone]

## 2021-05-19 ENCOUNTER — ANESTHESIA EVENT (OUTPATIENT)
Dept: SURGERY | Facility: HOSPITAL | Age: 56
End: 2021-05-19
Payer: COMMERCIAL

## 2021-05-20 NOTE — ANESTHESIA PREPROCEDURE EVALUATION
Anesthesia Pre-Procedure Evaluation    Patient: Katherine Royal   MRN: [id number] : 1965        Preoperative Diagnosis: Melena [K92.1]  Gastroesophageal reflux disease without esophagitis [K21.9]  Intermittent abdominal pain [R10.9]  Neck pain [M54.2]  Cough [R05]   Procedure : Procedure(s):  COLONOSCOPY, WITH UPPER ENDOSCOPY     No past medical history on file.   Past Surgical History:   Procedure Laterality Date     APPENDECTOMY       LAPAROSCOPY DIAGNOSTIC (GYN) N/A 5/10/2017    Procedure: LAPAROSCOPY DIAGNOSTIC (GYN);  DIAGNOSTIC LAPAROSCOPY, WITH LEFT SALPINGO-OOPHORECTOMY,FULGERATION OF ENDOMETRIOSIS, LYSIS OF ADHESIONS;  Surgeon: Misael Vance MD;  Location: [facility]      No Known Allergies   Social History     Tobacco Use     Smoking status: Never Smoker     Smokeless tobacco: Never Used   Substance Use Topics     Alcohol use: No      Wt Readings from Last 1 Encounters:   21 56.7 kg (125 lb)        Anesthesia Evaluation   Pt has had prior anesthetic. Type: General.        ROS/MED HX  ENT/Pulmonary:     (+) ANETA risk factors, hypertension,     Neurologic: Comment: insomnia      Cardiovascular:     (+) hypertension-----    METS/Exercise Tolerance: >4 METS    Hematologic:  - neg hematologic  ROS     Musculoskeletal: Comment: Neck pain - neg musculoskeletal ROS     GI/Hepatic: Comment: dysphagia    (+) GERD, bowel prep,     Renal/Genitourinary:     (+) Nephrolithiasis ,     Endo:     (+) type II DM,     Psychiatric/Substance Use:  - neg psychiatric ROS     Infectious Disease:  - neg infectious disease ROS     Malignancy:  - neg malignancy ROS     Other:  - neg other ROS          Physical Exam    Airway        Mallampati: I   TM distance: > 3 FB   Neck ROM: full   Mouth opening: > 3 cm    Respiratory Devices and Support         Dental       (+) upper dentures      Cardiovascular   cardiovascular exam normal       Rhythm and rate: regular and normal     Pulmonary   pulmonary exam normal        breath  sounds clear to auscultation           OUTSIDE LABS:  CBC:   Lab Results   Component Value Date    WBC 5.1 12/18/2020    WBC 4.8 05/22/2020    HGB 13.6 12/18/2020    HGB 13.8 05/22/2020    HCT 40.7 12/18/2020    HCT 40.2 05/22/2020     12/18/2020     05/22/2020     BMP:   Lab Results   Component Value Date     12/18/2020     05/22/2020    POTASSIUM 4.2 12/18/2020    POTASSIUM 4.2 05/22/2020    CHLORIDE 106 12/18/2020    CHLORIDE 108 05/22/2020    CO2 28 12/18/2020    CO2 25 05/22/2020    BUN 19 12/18/2020    BUN 15 05/22/2020    CR 0.62 12/18/2020    CR 0.49 (L) 05/22/2020     (H) 12/18/2020     (H) 05/22/2020     COAGS: No results found for: PTT, INR, FIBR  POC:   Lab Results   Component Value Date     (H) 05/10/2017    HCG Negative 09/12/2017     HEPATIC:   Lab Results   Component Value Date    ALBUMIN 4.0 12/18/2020    PROTTOTAL 7.3 12/18/2020    ALT 28 12/18/2020    AST 16 12/18/2020    ALKPHOS 80 12/18/2020    BILITOTAL 0.3 12/18/2020     OTHER:   Lab Results   Component Value Date    A1C 5.9 (H) 04/09/2021    DAVIDE 9.3 12/18/2020    TSH 0.68 05/22/2020       Anesthesia Plan    ASA Status:  3   NPO Status:  NPO Appropriate    Anesthesia Type: MAC.     - Reason for MAC: straight local not clinically adequate   Induction: Intravenous, Propofol.   Maintenance: TIVA.        Consents    Anesthesia Plan(s) and associated risks, benefits, and realistic alternatives discussed. Questions answered and patient/representative(s) expressed understanding.     - Discussed with:  Patient         Postoperative Care            Comments:    Risks and benefits of MAC anesthetic discussed including dental damage, aspiration, loss of airway, conversion to general anesthetic, CV complications, MI, stroke, death. Pt wishes to proceed.             SARTHAK Jackson CRNA

## 2021-05-22 ENCOUNTER — OFFICE VISIT (OUTPATIENT)
Dept: FAMILY MEDICINE | Facility: OTHER | Age: 56
End: 2021-05-22
Attending: SURGERY
Payer: COMMERCIAL

## 2021-05-22 DIAGNOSIS — K21.9 GASTROESOPHAGEAL REFLUX DISEASE WITHOUT ESOPHAGITIS: ICD-10-CM

## 2021-05-22 LAB
SARS-COV-2 RNA RESP QL NAA+PROBE: NORMAL
SPECIMEN SOURCE: NORMAL

## 2021-05-22 PROCEDURE — U0005 INFEC AGEN DETEC AMPLI PROBE: HCPCS | Performed by: FAMILY MEDICINE

## 2021-05-22 PROCEDURE — U0003 INFECTIOUS AGENT DETECTION BY NUCLEIC ACID (DNA OR RNA); SEVERE ACUTE RESPIRATORY SYNDROME CORONAVIRUS 2 (SARS-COV-2) (CORONAVIRUS DISEASE [COVID-19]), AMPLIFIED PROBE TECHNIQUE, MAKING USE OF HIGH THROUGHPUT TECHNOLOGIES AS DESCRIBED BY CMS-2020-01-R: HCPCS | Performed by: FAMILY MEDICINE

## 2021-05-23 LAB
LABORATORY COMMENT REPORT: NORMAL
SARS-COV-2 RNA RESP QL NAA+PROBE: NEGATIVE
SPECIMEN SOURCE: NORMAL

## 2021-05-25 DIAGNOSIS — I10 BENIGN ESSENTIAL HYPERTENSION: ICD-10-CM

## 2021-05-25 NOTE — TELEPHONE ENCOUNTER
Hydrochlorothiazide 12.5 mg      Last Written Prescription Date:  3/25/21  Last Fill Quantity: 90,   # refills: 0  Last Office Visit: 5/22/21  Future Office visit:    Next 5 appointments (look out 90 days)    Jul 09, 2021  2:30 PM  (Arrive by 2:15 PM)  SHORT with Delbert Rosas MD  [facility] ([facility] [place] ) [address]  [place] [address]  [phone]           Routing refill request to provider for review/approval because:  Drug not on the [facility], [facility] or [facility] refill protocol or controlled substance

## 2021-05-26 RX ORDER — HYDROCHLOROTHIAZIDE 12.5 MG/1
12.5 TABLET ORAL DAILY
Qty: 90 TABLET | Refills: 0 | Status: SHIPPED | OUTPATIENT
Start: 2021-05-26 | End: 2021-08-11

## 2021-05-27 ENCOUNTER — HOSPITAL ENCOUNTER (OUTPATIENT)
Facility: HOSPITAL | Age: 56
Discharge: HOME | End: 2021-05-27
Attending: SURGERY | Admitting: SURGERY
Payer: COMMERCIAL

## 2021-05-27 ENCOUNTER — ANESTHESIA (OUTPATIENT)
Dept: SURGERY | Facility: HOSPITAL | Age: 56
End: 2021-05-27
Payer: COMMERCIAL

## 2021-05-27 VITALS
HEART RATE: 83 BPM | DIASTOLIC BLOOD PRESSURE: 72 MMHG | OXYGEN SATURATION: 99 % | BODY MASS INDEX: 20.83 KG/M2 | RESPIRATION RATE: 16 BRPM | WEIGHT: 122 LBS | SYSTOLIC BLOOD PRESSURE: 101 MMHG | HEIGHT: 64 IN | TEMPERATURE: 97.4 F

## 2021-05-27 DIAGNOSIS — R05.9 COUGH: ICD-10-CM

## 2021-05-27 DIAGNOSIS — R10.9 INTERMITTENT ABDOMINAL PAIN: ICD-10-CM

## 2021-05-27 DIAGNOSIS — M54.2 NECK PAIN: ICD-10-CM

## 2021-05-27 DIAGNOSIS — K21.9 GASTROESOPHAGEAL REFLUX DISEASE WITHOUT ESOPHAGITIS: ICD-10-CM

## 2021-05-27 DIAGNOSIS — K92.1 MELENA: ICD-10-CM

## 2021-05-27 PROCEDURE — 88305 TISSUE EXAM BY PATHOLOGIST: CPT | Mod: TC | Performed by: SURGERY

## 2021-05-27 PROCEDURE — 272N000001 HC OR GENERAL SUPPLY STERILE: Performed by: SURGERY

## 2021-05-27 PROCEDURE — 43239 EGD BIOPSY SINGLE/MULTIPLE: CPT | Performed by: NURSE ANESTHETIST, CERTIFIED REGISTERED

## 2021-05-27 PROCEDURE — 43239 EGD BIOPSY SINGLE/MULTIPLE: CPT | Mod: 51 | Performed by: SURGERY

## 2021-05-27 PROCEDURE — 999N000141 HC STATISTIC PRE-PROCEDURE NURSING ASSESSMENT: Performed by: SURGERY

## 2021-05-27 PROCEDURE — 360N000075 HC SURGERY LEVEL 2, PER MIN: Performed by: SURGERY

## 2021-05-27 PROCEDURE — 45378 DIAGNOSTIC COLONOSCOPY: CPT | Performed by: SURGERY

## 2021-05-27 PROCEDURE — 250N000011 HC RX IP 250 OP 636: Performed by: NURSE ANESTHETIST, CERTIFIED REGISTERED

## 2021-05-27 PROCEDURE — 258N000003 HC RX IP 258 OP 636: Performed by: NURSE ANESTHETIST, CERTIFIED REGISTERED

## 2021-05-27 PROCEDURE — 370N000017 HC ANESTHESIA TECHNICAL FEE, PER MIN: Performed by: SURGERY

## 2021-05-27 PROCEDURE — 710N000012 HC RECOVERY PHASE 2, PER MINUTE: Performed by: SURGERY

## 2021-05-27 PROCEDURE — 250N000009 HC RX 250: Performed by: NURSE ANESTHETIST, CERTIFIED REGISTERED

## 2021-05-27 RX ORDER — FENTANYL CITRATE 50 UG/ML
25-50 INJECTION, SOLUTION INTRAMUSCULAR; INTRAVENOUS EVERY 5 MIN PRN
Status: DISCONTINUED | OUTPATIENT
Start: 2021-05-27 | End: 2021-05-27 | Stop reason: HOSPADM

## 2021-05-27 RX ORDER — PROPOFOL 10 MG/ML
INJECTION, EMULSION INTRAVENOUS PRN
Status: DISCONTINUED | OUTPATIENT
Start: 2021-05-27 | End: 2021-05-27 | Stop reason: HOSPADM

## 2021-05-27 RX ORDER — SODIUM CHLORIDE, SODIUM LACTATE, POTASSIUM CHLORIDE, CALCIUM CHLORIDE 600; 310; 30; 20 MG/100ML; MG/100ML; MG/100ML; MG/100ML
INJECTION, SOLUTION INTRAVENOUS CONTINUOUS
Status: DISCONTINUED | OUTPATIENT
Start: 2021-05-27 | End: 2021-05-27 | Stop reason: HOSPADM

## 2021-05-27 RX ORDER — ALBUTEROL SULFATE 0.83 MG/ML
2.5 SOLUTION RESPIRATORY (INHALATION) EVERY 4 HOURS PRN
Status: DISCONTINUED | OUTPATIENT
Start: 2021-05-27 | End: 2021-05-27 | Stop reason: HOSPADM

## 2021-05-27 RX ORDER — NALOXONE HYDROCHLORIDE 0.4 MG/ML
0.2 INJECTION, SOLUTION INTRAMUSCULAR; INTRAVENOUS; SUBCUTANEOUS
Status: DISCONTINUED | OUTPATIENT
Start: 2021-05-27 | End: 2021-05-27 | Stop reason: HOSPADM

## 2021-05-27 RX ORDER — NALOXONE HYDROCHLORIDE 0.4 MG/ML
0.4 INJECTION, SOLUTION INTRAMUSCULAR; INTRAVENOUS; SUBCUTANEOUS
Status: DISCONTINUED | OUTPATIENT
Start: 2021-05-27 | End: 2021-05-27 | Stop reason: HOSPADM

## 2021-05-27 RX ORDER — ONDANSETRON 2 MG/ML
4 INJECTION INTRAMUSCULAR; INTRAVENOUS EVERY 30 MIN PRN
Status: DISCONTINUED | OUTPATIENT
Start: 2021-05-27 | End: 2021-05-27 | Stop reason: HOSPADM

## 2021-05-27 RX ORDER — ONDANSETRON 4 MG/1
4 TABLET, ORALLY DISINTEGRATING ORAL EVERY 30 MIN PRN
Status: DISCONTINUED | OUTPATIENT
Start: 2021-05-27 | End: 2021-05-27 | Stop reason: HOSPADM

## 2021-05-27 RX ORDER — LIDOCAINE HYDROCHLORIDE 20 MG/ML
INJECTION, SOLUTION INFILTRATION; PERINEURAL PRN
Status: DISCONTINUED | OUTPATIENT
Start: 2021-05-27 | End: 2021-05-27 | Stop reason: HOSPADM

## 2021-05-27 RX ORDER — LABETALOL 20 MG/4 ML (5 MG/ML) INTRAVENOUS SYRINGE
10
Status: DISCONTINUED | OUTPATIENT
Start: 2021-05-27 | End: 2021-05-27 | Stop reason: HOSPADM

## 2021-05-27 RX ORDER — MEPERIDINE HYDROCHLORIDE 25 MG/ML
12.5 INJECTION INTRAMUSCULAR; INTRAVENOUS; SUBCUTANEOUS
Status: DISCONTINUED | OUTPATIENT
Start: 2021-05-27 | End: 2021-05-27 | Stop reason: HOSPADM

## 2021-05-27 RX ORDER — GLYCOPYRROLATE 0.2 MG/ML
INJECTION, SOLUTION INTRAMUSCULAR; INTRAVENOUS PRN
Status: DISCONTINUED | OUTPATIENT
Start: 2021-05-27 | End: 2021-05-27 | Stop reason: HOSPADM

## 2021-05-27 RX ORDER — LIDOCAINE 40 MG/G
CREAM TOPICAL
Status: DISCONTINUED | OUTPATIENT
Start: 2021-05-27 | End: 2021-05-27 | Stop reason: HOSPADM

## 2021-05-27 RX ORDER — HYDROMORPHONE HYDROCHLORIDE 1 MG/ML
.3-.5 INJECTION, SOLUTION INTRAMUSCULAR; INTRAVENOUS; SUBCUTANEOUS EVERY 10 MIN PRN
Status: DISCONTINUED | OUTPATIENT
Start: 2021-05-27 | End: 2021-05-27 | Stop reason: HOSPADM

## 2021-05-27 RX ADMIN — LIDOCAINE HYDROCHLORIDE 40 MG: 20 INJECTION, SOLUTION INFILTRATION; PERINEURAL at 08:31

## 2021-05-27 RX ADMIN — GLYCOPYRROLATE 0.2 MG: 0.2 INJECTION, SOLUTION INTRAMUSCULAR; INTRAVENOUS at 08:43

## 2021-05-27 RX ADMIN — SODIUM CHLORIDE, POTASSIUM CHLORIDE, SODIUM LACTATE AND CALCIUM CHLORIDE: 600; 310; 30; 20 INJECTION, SOLUTION INTRAVENOUS at 07:57

## 2021-05-27 RX ADMIN — PROPOFOL 50 MG: 10 INJECTION, EMULSION INTRAVENOUS at 08:35

## 2021-05-27 RX ADMIN — PROPOFOL 50 MG: 10 INJECTION, EMULSION INTRAVENOUS at 08:31

## 2021-05-27 RX ADMIN — PROPOFOL 50 MG: 10 INJECTION, EMULSION INTRAVENOUS at 08:45

## 2021-05-27 ASSESSMENT — MIFFLIN-ST. JEOR: SCORE: 1133.39

## 2021-05-27 NOTE — OP NOTE
REPORT OF OPERATION  DATE OF PROCEDURE: 5/27/2021    PATIENT: Katherine Royal    SURGERY PREFORMED: Esophagogastroduodenoscopy with biopsies and colonoscopy     PREOPERATIVE DIAGNOSIS:   Abdominal Pain   Screening colonoscopy    POSTOPERATIVE DIAGNOSIS:    Normal Esophagogastroduodenoscopy   Squamous columnar junction at 37   Normal colonoscopy   Diverticulosis was not identified.   Hemorrhoids  were  identified.    SURGEON: Angel Rubi MD    ASSISTANTS: None    ANESTHESIA: Monitored Anesthesia Care    COMPLICATIONS: None apparent    TRANSFUSIONS: None    TISSUE TO PATHOLOGY: Duodenal, Antral and Distal Esophageal    FINDINGS:   Normal Esophagogastroduodenoscopy   Normal colonoscopy   Diverticulosis was not identified.   Hemorrhoids  were  identified.    INDICATIONS: This is a 55 year old female in need of an Esophagogastroduodenoscopy for Abdominal Pain and a colonoscopy for Screening colonoscopy.  The patient will be taken to the endoscopy suite for those procedures.    DESCRIPTIONS OF PROCEDURE IN DETAIL: After consent was obtained the patient was taken to the operative suite and olivia in the left lateral decubitus position.  The patient was identified and the correct patient was confirmed. Monitored Anesthesia Care was given by anesthesia. A time out was preformed verifying the correct patient and the correct procedure.  The entire operative team was in agreement.  All necessary equipment and supplies were in the room.    The endoscope was inserted into the mouth and passed without difficulty to the third portion of the duodenum.  Duodenal biopsies were taken.  The endoscope was then withdrawn through the duodenum, the duodenal bulb and pyloric channel and no abnormalities were noted.  The endoscope was brought back into the stomach and antral biopsies were obtained.  The endoscope was then retroflexed and no lesions of the fundus body or antrum were seen.  The endoscope was straightened back out and brought  into the distal esophagus and a well-defined squamocolumnar junction was identified at 37 cm. Biopsies of the distal esophagus were taken.  The endoscope was slowly withdrawn through the remaining esophagus no other abnormalities are seen,  The endoscope was withdrawn from the patient and the patient was positioned for colonoscopy.    Rectal exam was preformed and no lesions of the anal canal were noted.  The colonoscope was inserted into the anus and passed without difficulty to the cecum.  The cecum was identified by the ileocecal valve, the coalescence of the tinea and the appendiceal orifice.  Upon withdrawal all walls of the colon were visualized.  There were no polyps, masses or evidence of colitis seen.  Diverticulosis was not seen.  Upon reaching the rectum the scope was retroflexed and internal hemorrhoids  were  seen.  The scope was straightened back out and removed from the patient.  The patient was then taken to the recovery room in stable condition tolerating the procedure well.      Prep: good    Withdrawal time was 6 minutes.    It is recommended that the patient have another colonoscopy in 10 years.

## 2021-05-27 NOTE — ANESTHESIA CARE TRANSFER NOTE
Patient: Katherine Royal    Procedure(s):  COLONOSCOPY,  UPPER ENDOSCOPY WITH BIOPSIES    Diagnosis: Melena [K92.1]  Gastroesophageal reflux disease without esophagitis [K21.9]  Intermittent abdominal pain [R10.9]  Neck pain [M54.2]  Cough [R05]  Diagnosis Additional Information: No value filed.    Anesthesia Type:   MAC     Note:    Oropharynx: spontaneously breathing  Level of Consciousness: drowsy  Oxygen Supplementation: nasal cannula  Level of Supplemental Oxygen (L/min / FiO2): 2  Independent Airway: airway patency satisfactory and stable  Dentition: dentition unchanged  Vital Signs Stable: post-procedure vital signs reviewed and stable  Report to RN Given: handoff report given  Patient transferred to: Phase II    Handoff Report: Identifed the Patient, Identified the Reponsible Provider, Reviewed the pertinent medical history, Discussed the surgical course, Reviewed Intra-OP anesthesia mangement and issues during anesthesia, Set expectations for post-procedure period and Allowed opportunity for questions and acknowledgement of understanding      Vitals: (Last set prior to Anesthesia Care Transfer)  CRNA VITALS  5/27/2021 0818 - 5/27/2021 0852      5/27/2021             NIBP:  90/58    NIBP Mean:  70    Resp Rate (set):  8        Electronically Signed By: SARTHAK Acosta CRNA  May 27, 2021  8:52 AM

## 2021-05-27 NOTE — DISCHARGE INSTRUCTIONS
UPPER ENDOSCOPY    AFTER THE PROCEDURE    You will return to Same Day Surgery to rest for about an hour before you go home.    The doctor will talk with you and your family.    A family member/friend may visit with you.    You may burp up any air remaining in your stomach.    You may feel dizzy or light-headed from the medicine.    Your nurse will go over the discharge instructions with you and your caregiver and answer any of your questions.      You will be contacted the next day to check on how you are doing.    If biopsies were taken, you will be contacted with the results usually within 3 days.  BACK AT HOME    Rest for an hour or two after you get home.    When your throat is no longer numb and you have a gag reflex, take a few sips of cool water.  If you can swallow comfortably, you may start eating again.    You may have a mild sore throat for the rest of the day.    You will be contacted with results by the surgeons office within a week. If not contacted in one week, call the surgeons office.  WHAT TO WATCH FOR:  Problems rarely occur after the exam, but it is important to be aware of the early signs of a complication.  Call your doctor immediately if you have:    Difficulty swallowing or breathing    Unusual pain in your stomach or chest    Vomiting blood or dark material that looks like coffee grounds    Black or tarry stools    Temperature over 101.5 degrees    MORE QUESTIONS?  Please ask your doctor or nurse before the exam begins  or call your doctor at the clinic.    IF YOU MUST CANCEL YOUR PROCEDURE THE EVENING/NIGHT BEFORE, PLEASE CALL HOSPITAL ADMITTING -[phone] OR TOLL FREE [phone], [phone]. [phone].    Phone Numbers:  [facility] - [phone] [phone]  [facility] - [phone]  Surgery Patient Education - [phone] or toll free [phone]    INSTRUCTIONS AFTER COLONOSCOPY    WHEN YOU ARE BACK HOME:    Plan to rest for an hour or two after you get home.    You may have  some cramping or pressure until you pass gas.    You may resume your regular medications.    Eat a small, light meal at first, and then gradually return to normal meal sizes.    You will be contacted the next day to see how you are doing.  If you had a polyp removed:    Slight bleeding may occur.  You may have a slight blood stain on the toilet paper after a bowel movement.    To lessen the chance of bleeding, avoid heavy exercise for ONE WEEK.  This includes heavy lifting, vigorous sport activities, and heavy physical labor.  You may resume your normal sexual activity.      Avoid aspirin or aspirin products if instructed by your doctor.    If there is a polyp or biopsy, you will be contacted with results within one week.     WHAT TO WATCH FOR:  Problems rarely occur after the exam; however, it is important for you to watch for early signs of possible problems.  If you have     Unusual pain in your abdomen    Nausea and vomiting that persists    Excessive bleeding    Black or bloody bowel movements    Fever or temperature above 100.6 F  Please call your doctor ([facility] [phone]) or go to the nearest hospital emergency room.    Post-Anesthesia Patient Instructions    IMMEDIATELY FOLLOWING SURGERY:  Do not drive or operate machinery for the first twenty four hours after surgery.  Do not make any important decisions for twenty four hours after surgery or while taking narcotic pain medications or sedatives.  If you develop intractable nausea and vomiting or a severe headache please notify your doctor immediately.    FOLLOW-UP:  Please make an appointment with your surgeon as instructed. You do not need to follow up with anesthesia unless specifically instructed to do so.    WOUND CARE INSTRUCTIONS (if applicable):  Keep a dry clean dressing on the anesthesia/puncture wound site if there is drainage.  Once the wound has quit draining you may leave it open to air.  Generally you should leave the bandage intact  for twenty four hours unless there is drainage.  If the epidural site drains for more than 36-48 hours please call the anesthesia department.    QUESTIONS?:  Please feel free to call your physician or the hospital  if you have any questions, and they will be happy to assist you.

## 2021-05-27 NOTE — ADDENDUM NOTE
Addendum  created 05/27/21 0852 by Randy Cox APRN CRNA    Clinical Note Signed, Intraprocedure Event edited, Intraprocedure Staff edited

## 2021-05-27 NOTE — H&P
Surgery Consult Clinic Note      RE: Katherine Royal  : 1965    Chief Complaint:  Colon cancer screening  GERD  Melena  Sore throat  cough    History of Present Illness:  Rosario Moreno NP originally saw Mrs. Royal on 2021 for evaluation regarding screening colonoscopy.  Please refer to that note for further detail.  She is here today to update her H&P.  Katherine is scheduled for colonoscopy on 2021, which is outside the 30 day anesthesia clearance guidelines.  This was done because of scheduling availability.  She has no questions regarding  bowel prep.  Reports passing liquid stools today.  She specifically denies fevers, chills, nausea, vomiting, chest pain, shortness of breath, palpitations, sore throat, cough, or generalized feeling ill.      Medical history:  History reviewed. No pertinent past medical history.    Surgical history:  Past Surgical History:   Procedure Laterality Date     APPENDECTOMY       LAPAROSCOPY DIAGNOSTIC (GYN) N/A 5/10/2017    Procedure: LAPAROSCOPY DIAGNOSTIC (GYN);  DIAGNOSTIC LAPAROSCOPY, WITH LEFT SALPINGO-OOPHORECTOMY,FULGERATION OF ENDOMETRIOSIS, LYSIS OF ADHESIONS;  Surgeon: Misael Vance MD;  Location: [facility]       Family history:  Family History   Problem Relation Age of Onset     Diabetes Mother      Hypertension Mother      Diabetes Father        Medications:  Prior to Admission medications    Medication Sig Start Date End Date Taking? Authorizing Provider   aspirin (EQ ASPIRIN ADULT LOW DOSE) 81 MG EC tablet Take 1 tablet (81 mg) by mouth daily 18  Yes Delbert Rosas MD   atorvastatin (LIPITOR) 20 MG tablet Take 1 tablet by mouth once daily 21  Yes Delbert Rosas MD   bisacodyl (DULCOLAX) 5 MG EC tablet 2 tabs po at hs 2 night before surgery. 2 tabs at 3pm day before surgery. 21  Yes Rosario Moreno NP   DULoxetine (CYMBALTA) 20 MG capsule Take 1 capsule (20 mg) by mouth 2 times daily 21  Yes Delbert Rosas MD   gabapentin  (NEURONTIN) 300 MG capsule Take 1 capsule (300 mg) by mouth 3 times daily 12/18/20  Yes Delbert Rosas MD   hydrochlorothiazide (HYDRODIURIL) 12.5 MG tablet Take 1 tablet (12.5 mg) by mouth daily 5/26/21  Yes Delbert Rosas MD   lisinopril (ZESTRIL) 10 MG tablet Take 1 tablet (10 mg) by mouth daily 4/9/21  Yes Delbert Rosas MD   metFORMIN (GLUCOPHAGE) 1000 MG tablet TAKE 1 TABLET BY MOUTH TWICE DAILY WITH MEALS 4/9/21  Yes Delbert Rosas MD   polyethylene glycol (GOLYTELY) 236 g suspension Drink 8 oz q 10 mins until jug is 1/2 empty 6pm night prior to surgery. Refrigerate. Repeat 6 hours prior to surgery. 5/18/21  Yes Rosario Moreno NP   sucralfate (CARAFATE) 1 GM tablet Take 1 tablet (1 g) by mouth 4 times daily 4/29/21  Yes Melly Hutchison MD   vitamin B complex with vitamin C (VITAMIN  B COMPLEX) TABS tablet Take 1 tablet by mouth daily 2/28/18  Yes Delbert Rosas MD   blood glucose (ACCU-CHEK FASTCLIX) lancing device  9/26/16   Reported, Patient   blood glucose (NO BRAND SPECIFIED) lancets standard Use to test blood sugar 2 times daily or as directed. 11/24/17   Delbert Rosas MD   blood glucose monitoring (NO BRAND SPECIFIED) test strip Check blood glucose twice daily 11/24/17   Delbert Rosas MD   cloNIDine (CATAPRES) 0.1 MG tablet TAKE ONE TABLET BY MOUTH AT BEDTIME FOR  ELEVATED  BLOOD  PRESSURE. 4/9/21   Delbert Rosas MD   mupirocin (BACTROBAN) 2 % cream Apply topically 3 times daily 5/17/17   Delbert Rosas MD   omeprazole (PRILOSEC) 40 MG DR capsule TAKE 1 CAPSULE BY MOUTH ONCE DAILY 30-60  MINUTES  BEFORE  A  MEAL 4/9/21   Delbert Rosas MD     Allergies:  The patient has No Known Allergies.  .  Social history:  Social History     Tobacco Use     Smoking status: Never Smoker     Smokeless tobacco: Never Used   Substance Use Topics     Alcohol use: No     Marital status: .      Review of Systems:    Constitutional: Negative for fever, chills and weight loss.  "  HENT: Negative for ear pain, nosebleeds, congestion, sore throat, tinnitus and ear discharge.    Eyes: Negative for blurred vision, double vision, photophobia and pain.   Respiratory: Negative for cough, hemoptysis, shortness of breath, wheezing and stridor.    Cardiovascular: Negative for chest pain, palpitations and orthopnea.   Gastrointestinal: Negative for heartburn, nausea, vomiting, abdominal pain and blood in stool.   Genitourinary: Negative for urgency, frequency and hematuria.   Musculoskeletal: Negative for myalgias, back pain and joint pain.   Neurological: Negative for tingling, speech change and headaches.   Endo/Heme/Allergies: Does not bruise/bleed easily.   Psychiatric/Behavioral: Negative for depression, suicidal ideas and hallucinations. The patient is not nervous/anxious.    Physical Examination:  /77   Pulse 79   Temp 97.4  F (36.3  C) (Temporal)   Resp 16   Ht 1.626 m (5' 4\")   Wt 55.3 kg (122 lb)   SpO2 99%   BMI 20.94 kg/m    General: Alert and orientedx4, no acute distress, well-developed/well-nourished, ambulating without assistance  HEENT: normocephalic atraumatic, extraocular movements intact, PERRL Sclerae anicteric; Trachea mideline, no JVD  Chest:   Clear to auscultation bilaterally.  Cardiac: S1S2 , regular rate and rhythm without additional sounds  Abdomen: Soft, non-tender, non-distended  Extremities: Cursory exam unremarkable.  No peripheral edema noted.  Skin: Warm, dry, < 2 sec cap refill  Neuro: CN 2-12 grossly intact, no focal deficit, GCS 15  Psych: Pleasant, calm, asks appropriate questions      Assessment/Plan:  #1 Colonoscopy  #2 Esophagogastroduodenoscopy  #3 GERD  #4 Melena    The indications, risks, benefits and technical aspects of esophagogastroduodenoscopy were reviewed with her, her questions were asked and answered. Antral biopsy for histologic examination will be performed and the place of H. pylori in gastritis was discussed. Preoperative " preparation, npo after midnight preceding the date was discussed and a request made to hold aspirin containing agents one week prior to ameliorate antiplatelet effect.     Katherine Royal and I had a discussion about colonoscopies.  The indications, risks, benefits, althernatives and technical aspects of whole colon colonoscopy were outlined with risks including, but not limited to, perforation, bleeding and inability to visualize entire colon.  Management of each was reviewed including the risk for life saving surgery and possible admittance to the hospital.  Her questions were asked and answered.  We will proceed with exam as scheduled.  Maria Esther Gold [facility]  [address] [place], [place] [id number]    Referring Provider:  No referring provider defined for this encounter.     Primary Care Provider:  Delbert Rosas

## 2021-05-27 NOTE — ANESTHESIA POSTPROCEDURE EVALUATION
Patient: Katherine Royal    Procedure(s):  COLONOSCOPY,  UPPER ENDOSCOPY WITH BIOPSIES    Diagnosis:Melena [K92.1]  Gastroesophageal reflux disease without esophagitis [K21.9]  Intermittent abdominal pain [R10.9]  Neck pain [M54.2]  Cough [R05]  Diagnosis Additional Information: No value filed.    Anesthesia Type:  MAC    Note:  Disposition: Outpatient   Postop Pain Control: Uneventful            Sign Out: Well controlled pain   PONV: No   Neuro/Psych: Uneventful            Sign Out: Acceptable/Baseline neuro status   Airway/Respiratory: Uneventful   CV/Hemodynamics: Uneventful            Sign Out: Acceptable CV status; No obvious hypovolemia; No obvious fluid overload   Other NRE: NONE   DID A NON-ROUTINE EVENT OCCUR? No           Last vitals:  Vitals:    05/27/21 0740   BP: 104/77   Pulse: 79   Resp: 16   Temp: 97.4  F (36.3  C)   SpO2: 99%       Last vitals prior to Anesthesia Care Transfer:  CRNA VITALS  5/27/2021 0818 - 5/27/2021 0851      5/27/2021             NIBP:  90/58    NIBP Mean:  70    Resp Rate (set):  8          Electronically Signed By: SARTHAK Acosta CRNA  May 27, 2021  8:51 AM

## 2021-06-01 ENCOUNTER — TELEPHONE (OUTPATIENT)
Dept: FAMILY MEDICINE | Facility: OTHER | Age: 56
End: 2021-06-01

## 2021-06-01 ENCOUNTER — TELEPHONE (OUTPATIENT)
Dept: SURGERY | Facility: OTHER | Age: 56
End: 2021-06-01

## 2021-06-01 LAB — COPATH REPORT: NORMAL

## 2021-06-01 NOTE — TELEPHONE ENCOUNTER
Patient called wanting her results.     She also was asking about where they took the bx from?    Told her I would have the nurse call her.    Please call.

## 2021-06-01 NOTE — TELEPHONE ENCOUNTER
Call from patient reporting she has had the endoscopy and colonoscopy with Dr. Rubi. Patient requesting to provide an update to Dr. Rosas that the procedures are completed.     Patient notified Dr. Rosas will receive follow up report from the surgeon.     Patient verbalized understanding stating she has an upcoming appt scheduled with Dr. Rosas:    Next 5 appointments (look out 90 days)    Jul 09, 2021  2:30 PM  (Arrive by 2:15 PM)  SHORT with Delbert Rosas MD  [facility] ([facility] ) [facility]  [place] [address]  [phone]

## 2021-06-04 ENCOUNTER — TELEPHONE (OUTPATIENT)
Dept: FAMILY MEDICINE | Facility: OTHER | Age: 56
End: 2021-06-04

## 2021-06-04 DIAGNOSIS — R10.9 INTERMITTENT ABDOMINAL PAIN: Primary | ICD-10-CM

## 2021-06-04 DIAGNOSIS — K59.01 SLOW TRANSIT CONSTIPATION: ICD-10-CM

## 2021-06-04 NOTE — TELEPHONE ENCOUNTER
Patient called and states that she had upper scope, colonoscopy  And biopsy. Still having pain and sucralfate is not helping at all and she has been constipated. Any suggestions?

## 2021-07-06 NOTE — PROGRESS NOTES
Assessment & Plan     Type 2 diabetes mellitus without complication, unspecified whether long term insulin use (H)  Fasting labs are reviewed.  Goal of hemoglobin A1C of less than 7 discussed.  Instructed in the importance of diet, exercise, and good glycemic control in prevention of secondary complications.    Continue same medication regimen. Repeat fasting glucose and hemoglobin A1C in 6 months.   - Hemoglobin A1c  - metFORMIN (GLUCOPHAGE-XR) 750 MG 24 hr tablet; Take 2 tablets (1,500 mg) by mouth daily (with dinner)  - Estimated Average Glucose    Mixed hyperlipidemia  Fasting labs reviewed.  Goals discussed.  Discussed the importance of diet, exercise, and weight reduction.  Follow up 12 months.   - atorvastatin (LIPITOR) 20 MG tablet; Take 1 tablet (20 mg) by mouth daily    HTN (hypertension)  Goals reviewed.  Continue home BP monitoring and same medication regimen.  Follow up 6 months.   - cloNIDine (CATAPRES) 0.1 MG tablet; TAKE ONE TABLET BY MOUTH AT BEDTIME FOR  ELEVATED  BLOOD  PRESSURE.  - lisinopril (ZESTRIL) 10 MG tablet; Take 1 tablet (10 mg) by mouth daily    Gastroesophageal reflux disease without esophagitis  Omeprazole as written  - omeprazole (PRILOSEC) 40 MG DR capsule; TAKE 1 CAPSULE BY MOUTH ONCE DAILY 30-60  MINUTES  BEFORE  A  MEAL    See Patient Instructions    No follow-ups on file.    Delbert Rosas MD  [facility] - KRISTEN Murphy is a 55 year old who presents for the following health issues     HPI     Diabetes Follow-up    How often are you checking your blood sugar? One time daily  What time of day are you checking your blood sugars (select all that apply)?  fasting am  Have you had any blood sugars above 200?  No  Have you had any blood sugars below 70?  No    What symptoms do you notice when your blood sugar is low?  Shaky and Dizzy    What concerns do you have today about your diabetes? None     Do you have any of these symptoms? (Select all that  "apply)  Numbness in feet and Burning in feet    Have you had a diabetic eye exam in the last 12 months? No        BP Readings from Last 2 Encounters:   07/09/21 90/60   05/27/21 101/72     Hemoglobin A1C (%)   Date Value   04/09/2021 5.9 (H)   12/18/2020 6.0 (H)     LDL Cholesterol Calculated (mg/dL)   Date Value   12/18/2020 75   05/22/2020 61         Hypertension Follow-up      Do you check your blood pressure regularly outside of the clinic? Yes     Are you following a low salt diet? No    Are your blood pressures ever more than 140 on the top number (systolic) OR more   than 90 on the bottom number (diastolic), for example 140/90? No      Review of Systems         Objective    BP 90/60 (BP Location: Right arm, Patient Position: Sitting, Cuff Size: Adult Regular)   Pulse 86   Temp 97.4  F (36.3  C) (Tympanic)   Resp 16   Ht 1.626 m (5' 4\")   Wt 58.1 kg (128 lb)   SpO2 98%   BMI 21.97 kg/m    Body mass index is 21.97 kg/m .  Physical Exam  Vitals and nursing note reviewed.   Constitutional:       General: She is not in acute distress.     Appearance: She is well-developed.   HENT:      Head: Normocephalic and atraumatic.      Right Ear: External ear normal.      Left Ear: External ear normal.   Eyes:      Conjunctiva/sclera: Conjunctivae normal.      Pupils: Pupils are equal, round, and reactive to light.   Pulmonary:      Effort: Pulmonary effort is normal.      Breath sounds: Normal breath sounds.   Musculoskeletal:      Cervical back: Neck supple.   Lymphadenopathy:      Cervical: No cervical adenopathy.   Skin:     General: Skin is warm and dry.   Neurological:      Mental Status: She is alert and oriented to person, place, and time.          Results for orders placed or performed in visit on 07/09/21   Hemoglobin A1c     Status: Abnormal   Result Value Ref Range    Hemoglobin A1C 6.1 (H) 0 - 5.6 %   Estimated Average Glucose     Status: None   Result Value Ref Range    Estimated Average Glucose 128 " mg/dL

## 2021-07-09 ENCOUNTER — OFFICE VISIT (OUTPATIENT)
Dept: FAMILY MEDICINE | Facility: OTHER | Age: 56
End: 2021-07-09
Attending: FAMILY MEDICINE
Payer: COMMERCIAL

## 2021-07-09 VITALS
SYSTOLIC BLOOD PRESSURE: 90 MMHG | OXYGEN SATURATION: 98 % | DIASTOLIC BLOOD PRESSURE: 60 MMHG | RESPIRATION RATE: 16 BRPM | WEIGHT: 128 LBS | BODY MASS INDEX: 21.85 KG/M2 | HEART RATE: 86 BPM | HEIGHT: 64 IN | TEMPERATURE: 97.4 F

## 2021-07-09 DIAGNOSIS — E11.9 TYPE 2 DIABETES MELLITUS WITHOUT COMPLICATION, UNSPECIFIED WHETHER LONG TERM INSULIN USE (H): Primary | ICD-10-CM

## 2021-07-09 DIAGNOSIS — E78.2 MIXED HYPERLIPIDEMIA: ICD-10-CM

## 2021-07-09 DIAGNOSIS — I10 BENIGN ESSENTIAL HYPERTENSION: ICD-10-CM

## 2021-07-09 DIAGNOSIS — K21.9 GASTROESOPHAGEAL REFLUX DISEASE WITHOUT ESOPHAGITIS: ICD-10-CM

## 2021-07-09 LAB
EST. AVERAGE GLUCOSE BLD GHB EST-MCNC: 128 MG/DL
HBA1C MFR BLD: 6.1 % (ref 0–5.6)

## 2021-07-09 PROCEDURE — 99N1182 PR STATISTIC ESTIMATED AVERAGE GLUCOSE: Performed by: FAMILY MEDICINE

## 2021-07-09 PROCEDURE — 83036 HEMOGLOBIN GLYCOSYLATED A1C: CPT | Performed by: FAMILY MEDICINE

## 2021-07-09 PROCEDURE — 99214 OFFICE O/P EST MOD 30 MIN: CPT | Performed by: FAMILY MEDICINE

## 2021-07-09 PROCEDURE — 36415 COLL VENOUS BLD VENIPUNCTURE: CPT | Performed by: FAMILY MEDICINE

## 2021-07-09 RX ORDER — ATORVASTATIN CALCIUM 20 MG/1
20 TABLET, FILM COATED ORAL DAILY
Qty: 90 TABLET | Refills: 3 | Status: SHIPPED | OUTPATIENT
Start: 2021-07-09

## 2021-07-09 RX ORDER — METFORMIN HYDROCHLORIDE 750 MG/1
1500 TABLET, EXTENDED RELEASE ORAL
Qty: 180 TABLET | Refills: 3 | Status: SHIPPED | OUTPATIENT
Start: 2021-07-09

## 2021-07-09 RX ORDER — LISINOPRIL 10 MG/1
10 TABLET ORAL DAILY
Qty: 90 TABLET | Refills: 3 | Status: SHIPPED | OUTPATIENT
Start: 2021-07-09

## 2021-07-09 RX ORDER — OMEPRAZOLE 40 MG/1
CAPSULE, DELAYED RELEASE ORAL
Qty: 90 CAPSULE | Refills: 3 | Status: SHIPPED | OUTPATIENT
Start: 2021-07-09

## 2021-07-09 RX ORDER — CLONIDINE HYDROCHLORIDE 0.1 MG/1
TABLET ORAL
Qty: 30 TABLET | Refills: 3 | Status: SHIPPED | OUTPATIENT
Start: 2021-07-09

## 2021-07-09 ASSESSMENT — PAIN SCALES - GENERAL: PAINLEVEL: NO PAIN (0)

## 2021-07-09 ASSESSMENT — MIFFLIN-ST. JEOR: SCORE: 1160.6

## 2021-07-09 NOTE — NURSING NOTE
"No chief complaint on file.      Initial BP 90/60 (BP Location: Right arm, Patient Position: Sitting, Cuff Size: Adult Regular)   Pulse 86   Temp 97.4  F (36.3  C) (Tympanic)   Resp 16   Ht 1.626 m (5' 4\")   Wt 58.1 kg (128 lb)   SpO2 98%   BMI 21.97 kg/m   Estimated body mass index is 21.97 kg/m  as calculated from the following:    Height as of this encounter: 1.626 m (5' 4\").    Weight as of this encounter: 58.1 kg (128 lb).  Medication Reconciliation: complete  Patricia Varma LPN    "

## 2021-07-15 ENCOUNTER — TELEPHONE (OUTPATIENT)
Dept: SURGERY | Facility: OTHER | Age: 56
End: 2021-07-15

## 2021-07-15 DIAGNOSIS — K59.01 SLOW TRANSIT CONSTIPATION: Primary | ICD-10-CM

## 2021-07-15 DIAGNOSIS — R10.9 INTERMITTENT ABDOMINAL PAIN: ICD-10-CM

## 2021-07-15 NOTE — TELEPHONE ENCOUNTER
Pharmacy is requesting clindamycin 300 mg TID for 10 days. Medication not on med list. Please advise pharmacy.

## 2021-07-15 NOTE — TELEPHONE ENCOUNTER
Returned call to patient. Discussed that her records indicate that she was seen for consult for GERD, melena, throat pain, cough, etc, so this was a diagnostic procedure. Patient argued that she was due for a screening colonoscopy, so this should be considered preventative. Advised patient that she was seen for symptoms, so no longer considered screening. Advised patient that she is welcome to discuss further with billing department. Call transferred to billing to discuss.

## 2021-07-15 NOTE — TELEPHONE ENCOUNTER
Patient called asking if Dr Rubi could change the coding of the surgery on 5/27 from routine to preventive. Patient's insurance will cover if  Preventive not routine. Any questions, please call patient back at [phone].  Patient did not want to speak with the billing department

## 2021-07-16 RX ORDER — CLINDAMYCIN HCL 300 MG
300 CAPSULE ORAL 3 TIMES DAILY
Qty: 30 CAPSULE | Refills: 0 | Status: SHIPPED | OUTPATIENT
Start: 2021-07-16

## 2021-08-09 ENCOUNTER — TELEPHONE (OUTPATIENT)
Dept: FAMILY MEDICINE | Facility: OTHER | Age: 56
End: 2021-08-09

## 2021-08-09 DIAGNOSIS — I10 BENIGN ESSENTIAL HYPERTENSION: ICD-10-CM

## 2021-08-09 DIAGNOSIS — Z98.890 POSTOPERATIVE STATE: ICD-10-CM

## 2021-08-09 NOTE — TELEPHONE ENCOUNTER
Patient is calling wondering if the form was signed that she faxed last week.  Health benefits form for her job.    [phone]  Call her back           Please send to [facility] in [place]      mupirocin (BACTROBAN) 2 % cream      Last Written Prescription Date:  5/17/17  Last Fill Quantity: 30g,   # refills: 0  Last Office Visit: 7/9/21  Future Office visit:       Routing refill request to provider for review/approval because:  Drug not on the [facility], [facility] or [facility] refill protocol or controlled substance      hydrochlorothiazide (HYDRODIURIL) 12.5 MG tablet      Last Written Prescription Date:  5/26/21  Last Fill Quantity: 90,   # refills: 0  Last Office Visit: 7/9/21  Future Office visit:       Routing refill request to provider for review/approval because:  Drug not on the [facility], [facility] or [facility] refill protocol or controlled substance

## 2021-08-11 ENCOUNTER — TELEPHONE (OUTPATIENT)
Dept: FAMILY MEDICINE | Facility: OTHER | Age: 56
End: 2021-08-11

## 2021-08-11 RX ORDER — HYDROCHLOROTHIAZIDE 12.5 MG/1
12.5 TABLET ORAL DAILY
Qty: 90 TABLET | Refills: 0 | Status: SHIPPED | OUTPATIENT
Start: 2021-08-11

## 2021-08-11 RX ORDER — MUPIROCIN CALCIUM 20 MG/G
CREAM TOPICAL 3 TIMES DAILY
Qty: 30 G | Refills: 0 | Status: SHIPPED | OUTPATIENT
Start: 2021-08-11

## 2021-08-11 NOTE — TELEPHONE ENCOUNTER
Patient calling back to see if form was completed and is needing refills. States she is out. Please advise, thank you.

## 2021-08-11 NOTE — TELEPHONE ENCOUNTER
Patient calling in regards to form for her employer. Was this form received? Patient states she faxed the form last week. Please call patient to let her know that form was or was not received. Please advise, thank you.    patient's phone number is [phone]

## 2021-08-12 DIAGNOSIS — Z98.890 POSTOPERATIVE STATE: Primary | ICD-10-CM

## 2021-08-12 RX ORDER — MUPIROCIN 20 MG/G
OINTMENT TOPICAL 3 TIMES DAILY
Qty: 30 G | Refills: 0 | Status: SHIPPED | OUTPATIENT
Start: 2021-08-12

## 2021-08-12 NOTE — TELEPHONE ENCOUNTER
Call from patient requesting Bactroban ointment, patient reports Bactroban cream was sent to the pharmacy on 8/11/21.    Order pended for Bactroban Ointment - No protocol provided.     Dr. Rosas Out of Office, will send to covering provider.

## 2021-08-13 NOTE — TELEPHONE ENCOUNTER
Spoke with patient. Informed her that I was unable to locate the form . I requested that she have her employer re fax the form . Fax number has been provided to patient . She has also been informed that Dr. Rosas is out of the office until 8- .

## 2021-08-18 ENCOUNTER — TELEPHONE (OUTPATIENT)
Dept: FAMILY MEDICINE | Facility: OTHER | Age: 56
End: 2021-08-18

## 2021-08-18 NOTE — TELEPHONE ENCOUNTER
Call from patient inquiring on form. Please advise.     Triage fax # provided as patient reports no answer when faxing to [employer].

## 2021-08-18 NOTE — TELEPHONE ENCOUNTER
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     She is wondering if it was ever taken care.    Please call patient

## 2021-08-19 NOTE — TELEPHONE ENCOUNTER
Spoke with Katherine . She was given another number  To try and fax the forms to . According to Dr. Rosas the forms were completed and given to a nurse before he went out.

## 2021-08-19 NOTE — TELEPHONE ENCOUNTER
Leah reached out to me to help get these forms to us. I called and talked to Cara and had her email me the forms. She had all of our fax number and stated that not one would go through. I did find one that was on the desk by fax machine but it came in at [facility] and you couldn't read it. Cara emailed me the forms. I printed them and gave them to Courtney to check with Leah tomorrow 8/20/21 to see who to have fill them out. Also emailed Cara and VON'beena Lynn and Courtney stating that we would get them back to her tomorrow. I will be gone on vacation. This is why Courtney will be taking care of them.

## 2022-03-21 DIAGNOSIS — R10.9 INTERMITTENT ABDOMINAL PAIN: ICD-10-CM

## 2022-03-21 RX ORDER — CLINDAMYCIN HCL 300 MG
300 CAPSULE ORAL 3 TIMES DAILY
Qty: 30 CAPSULE | Refills: 0 | Status: CANCELLED | OUTPATIENT
Start: 2022-03-21

## 2022-03-21 NOTE — TELEPHONE ENCOUNTER
CLINDAMYCIN    NEEDS NEW PCP  Last Written Prescription Date:  7-  Last Fill Quantity: 30,   # refills: 0  Last Office Visit: 7-9-2021  Future Office visit:       Routing refill request to provider for review/approval because:  NEEDS NEW PCP  Drug not on the [facility], [facility] or [facility] refill protocol or controlled substance

## 2022-03-23 NOTE — TELEPHONE ENCOUNTER
Attempt # 2  Outcome: Left Message   Comment: lvm for patient to call back and schedule a new patient visit with a new primary. Gave the direct scheduling number

## (undated) DEVICE — APPLICATOR-CHLORAPREP 26ML TINTED CHG 2%+ 70% IPA-SURGICAL

## (undated) DEVICE — SPONGE-LAPAROTOMY PADS 18 X 18

## (undated) DEVICE — TOPICAL SKIN ADHESIVE EXOFIN

## (undated) DEVICE — PUNCTURE CLOSURE DEVICE

## (undated) DEVICE — SCD SLEEVE-THIGH REG.

## (undated) DEVICE — BIN-UROLOGY / CYSTO

## (undated) DEVICE — POUCH-INSTRUMENT 3 COMP 9-5/8 X 18 IN

## (undated) DEVICE — BLADE-SURG CLIPPER

## (undated) DEVICE — UTERINE MANIPULATOR-KRONNER MANIPUJECTOR

## (undated) DEVICE — LABEL-STERILE PREPRINTED FOR OR

## (undated) DEVICE — GLV-7.0 PROTEXIS PI CLASSIC LF/PF

## (undated) DEVICE — TROCAR-11X100MM BLADED W/FIXATION

## (undated) DEVICE — PACK-LAP LAVH-CUSTOM

## (undated) DEVICE — IRRIGATION-H2O 1000ML

## (undated) DEVICE — SPATULA TIP FOR SUCTION IRRIGATION PROBE

## (undated) DEVICE — CANISTER-SUCTION 2000CC

## (undated) DEVICE — TUBING-SUCTION 20FT

## (undated) DEVICE — TRAY-SKIN PREP POVIDONE/IODINE

## (undated) DEVICE — LIGHT HANDLE COVER

## (undated) DEVICE — SUCTION-IRRIGATION STRYKEFLOW II (STRYKER)

## (undated) DEVICE — TUBING-INSUFFLATION/LAPAROFLATOR W/FILTER

## (undated) DEVICE — GLV-8.5 BIOGEL LATEX

## (undated) DEVICE — CATH-URETHRAL 14FR

## (undated) DEVICE — CONNECTOR-ERBEFLO 2 PORT

## (undated) DEVICE — TOWEL-OR DISP 4PKS

## (undated) DEVICE — DRSG-NON ADHERING 3 X 8 TELFA

## (undated) DEVICE — LIGASURE-5MM BLUNT TIP LAPAROSCOPIC

## (undated) DEVICE — INZII RETRIEVAL SYSTEM-10MM

## (undated) DEVICE — CAUTERY PAD-POLYHESIVE II ADULT

## (undated) DEVICE — FORCEP-COLON BIOPSY LARGE W/NEEDLE 240CM

## (undated) DEVICE — SCISSOR-ENDOPATH 5MM CURVED

## (undated) DEVICE — TROCAR-5X100MM BLADED W/FIXATION

## (undated) DEVICE — BLANKET-BAIR UPPER BODY

## (undated) DEVICE — NDL-INSUFFLATION 120MM

## (undated) RX ORDER — PROPOFOL 10 MG/ML
INJECTION, EMULSION INTRAVENOUS
Status: DISPENSED
Start: 2021-05-27

## (undated) RX ORDER — ONDANSETRON 2 MG/ML
INJECTION INTRAMUSCULAR; INTRAVENOUS
Status: DISPENSED
Start: 2017-05-10

## (undated) RX ORDER — LIDOCAINE HYDROCHLORIDE 20 MG/ML
INJECTION, SOLUTION EPIDURAL; INFILTRATION; INTRACAUDAL; PERINEURAL
Status: DISPENSED
Start: 2017-05-10

## (undated) RX ORDER — DEXAMETHASONE SODIUM PHOSPHATE 10 MG/ML
INJECTION, SOLUTION INTRAMUSCULAR; INTRAVENOUS
Status: DISPENSED
Start: 2017-05-10

## (undated) RX ORDER — FENTANYL CITRATE 50 UG/ML
INJECTION, SOLUTION INTRAMUSCULAR; INTRAVENOUS
Status: DISPENSED
Start: 2017-05-10

## (undated) RX ORDER — LIDOCAINE HYDROCHLORIDE 20 MG/ML
INJECTION, SOLUTION EPIDURAL; INFILTRATION; INTRACAUDAL; PERINEURAL
Status: DISPENSED
Start: 2021-05-27

## (undated) RX ORDER — PROPOFOL 10 MG/ML
INJECTION, EMULSION INTRAVENOUS
Status: DISPENSED
Start: 2017-05-10